# Patient Record
Sex: MALE | Race: WHITE | Employment: OTHER | URBAN - METROPOLITAN AREA
[De-identification: names, ages, dates, MRNs, and addresses within clinical notes are randomized per-mention and may not be internally consistent; named-entity substitution may affect disease eponyms.]

---

## 2017-05-22 PROBLEM — E66.9 OBESITY (BMI 30.0-34.9): Status: ACTIVE | Noted: 2017-05-22

## 2017-05-22 PROBLEM — R97.20 ELEVATED PSA: Status: ACTIVE | Noted: 2017-05-22

## 2018-01-03 PROBLEM — N40.2 PROSTATE NODULE: Status: ACTIVE | Noted: 2018-01-03

## 2018-01-15 ENCOUNTER — HOSPITAL ENCOUNTER (OUTPATIENT)
Dept: LAB | Age: 68
Discharge: HOME | End: 2018-01-15

## 2018-01-15 PROCEDURE — 88305 TISSUE EXAM BY PATHOLOGIST: CPT | Performed by: UROLOGY

## 2019-05-29 ENCOUNTER — HOSPITAL ENCOUNTER (OUTPATIENT)
Dept: PHYSICAL THERAPY | Age: 69
Discharge: HOME | End: 2019-05-29
Payer: MEDICARE

## 2019-05-29 PROCEDURE — 97140 MANUAL THERAPY 1/> REGIONS: CPT

## 2019-05-29 PROCEDURE — 97161 PT EVAL LOW COMPLEX 20 MIN: CPT

## 2019-05-29 NOTE — THERAPY EVALUATION
Dayana Goss  : 1950  [place]  Secondary: Bernardino Boone 73 at . S[address] Candelaria 37  [facility]. [place] [address], [address] [facility]  [phone]   [phone]       Visit Count:  1     OUTPATIENT PHYSICAL THERAPY:Initial Assessment 2019   ICD-10: Treatment Diagnosis: Pain in Right Shoulder (M25.511)  Stiffness of Right Shoulder, Not elsewhere classified (M25.611)  Precautions/Allergies:   Patient has no known allergies. MD Orders: evaluate and treat  MEDICAL/REFERRING DIAGNOSIS:  Pain in right shoulder [M25.511]  Bursitis of right shoulder [M75.51]   DATE OF ONSET: 6+ months ago  REFERRING PHYSICIAN: Nick Ochoa MD  RETURN PHYSICIAN APPOINTMENT: 6 weeks from referral      INITIAL ASSESSMENT:  Mr. Scottie Dick presents with functional limiations due to right shoulder pain. PT evaluation reveals signs and symptoms consistent with impingement including pain with shoulder elevation and rotation radiating to upper arm, poor postural alignment, poor scapular thoracic rhythm and shoulder hiking. He was diagnosed with a tear of rotator tendon through MRI and referred to PT to attempt conservative intervention prior to surgery. This patient will highly benefit from skilled PT at this time to improve shoulder mechanics and decrease further damage to tissue. PROBLEM LIST (Impacting functional limitations):  1. Decreased Strength  2. Increased Pain  3. Decreased Flexibility/Joint Mobility  4. Decreased Knowledge of Precautions  5. Decreased [place] with Home Exercise Program INTERVENTIONS PLANNED: (Treatment may consist of any combination of the following)  1. Cold  2. Heat  3. Home Exercise Program (HEP)  4. Manual Therapy  5. Neuromuscular Re-education/Strengthening  6. Range of Motion (ROM)  7. Therapeutic Exercise/Strengthening   TREATMENT PLAN:  Effective Dates: 2019 TO 2019 (90 days).   Frequency/Duration: 2 times a week for 90 Day(s)  GOALS: (Goals have been discussed and agreed upon with patient.)  Short-Term Functional Goals: Time Frame: 2-4 weeks    1. Pt will report shoulder pain is no longer constant. 2. Pt will demonstrate increased active shoulder flexion and abduction to >160 without pain to promote use. 3. Pt will demonstrate functional shoulder internal/external rotation without pain to promote active use. 4. Pt will participate in postural re-education to improve alignment during functional use of BUE  5. Pt will be independent with HEP  6. Pt will demonstrate a 5 point increase on the DASH to indicate decreased perceived disability    Long Term Goals Time frame   1. Pt will demonstrate full AROM against gravity without pain to promote pain free lifting, reaching and ADLs  2. Pt will demonstrate at least 4+/5 shoulder strength to assist with carrying and lifting during daily activities  3. Pt will report less than 2/10 pain during daily activities. 4. Pt will demonstrate a 8 point increase on the DASH to indicate decreased perceived disability  5. Pt will demonstrate awareness of proper posture and mechanics to promote carryover in ADLs. OUTCOME MEASURE:   Tool Used: Disabilities of the Arm, Shoulder and Hand (DASH) Questionnaire - Quick Version  Score:  Initial: 24/55  Most Recent: X/55 (Date: -- )   Interpretation of Score: The DASH is designed to measure the activities of daily living in person's with upper extremity dysfunction or pain. Each section is scored on a 1-5 scale, 5 representing the greatest disability. The scores of each section are added together for a total score of 55. MEDICAL NECESSITY:   · Patient is expected to demonstrate progress in strength and range of motion to increase independence with ADLs and recreational activities.   REASON FOR SERVICES/OTHER COMMENTS:  · Patient continues to require modification of therapeutic interventions to increase complexity of exercises. Total Duration:  PT Patient Time In/Time Out  Time In: 0800  Time Out: 0900    Rehabilitation Potential For Stated Goals: Good  Regarding Berneta Duverney Spiller's therapy, I certify that the treatment plan above will be carried out by a therapist or under their direction. Thank you for this referral,  Lenny Sullivan, PT     Referring Physician Signature: Nick Ochoa MD _______________________________ Date _____________     PAIN/SUBJECTIVE:   Initial:   2/10 Post Session:  3/10   HISTORY:   History of Injury/Illness (Reason for Referral):  Pt states that he began experiencing right shoulder pain last fall while picking up heavy bags. Progressively worse. Went to MD, x-ray negative, MRI positive for small RC tear. Current symptoms: constant ache with intermittent sharp moments lateral right shoulder and upper arm. Aggravating factors: reaching behind, dressing, lifting, reaching overhead    Relieving factors: rest        Pain: 2/10 presently, 8/10 worst     Past Medical History/Comorbidities:   Mr. Scottie Dick  has a past medical history of BPH associated with nocturia (10/10/2011), Calculus of kidney, Chronic otitis media, Conductive hearing loss, Deviated nasal septum, Dysfunction of both eustachian tubes, Elevated PSA (5/22/2017), External hemorrhoids, Hearing loss, Hypertension (10/10/2011), Hypertrophy of prostate with urinary obstruction and other lower urinary tract symptoms (LUTS) (10/10/2011), Mixed hearing loss, bilateral (4/22/2012), Mixed hyperlipidemia (10/10/2011), Obesity (BMI 30.0-34.9) (5/22/2017), Perforation of left tympanic membrane, Right chronic serous otitis media, and SNHL (sensorineural hearing loss). Mr. Scottie Dick  has a past surgical history that includes hx myringotomy (Bilateral, 01/25/2016) and hx colonoscopy (05/21/2014).   Social History/Living Environment:    one story home with wife     Prior Level of Function/Work/Activity:    Dominant Side:         RIGHT    Other Clinical Tests:          Imaging: YES x-ray and MRI     Previous Treatment Approaches:          Antiinflammatory topical cream    Ambulatory/Rehab Services H2 Model Falls Risk Assessment   Risk Factors:       (1)  Gender [Male] Ability to Rise from Chair:       (4)  Unable to rise without assistance   Falls Prevention Plan:       No modifications necessary   Total: (5 or greater = High Risk): 1   ©2010 [facility] [employer]. All Rights Reserved. [employer] Patent #[id number]. Federal Law prohibits the replication, distribution or use without written permission from [facility] [employer]   Current Medications:       Current Outpatient Medications:     diclofenac (VOLTAREN) 1 % gel, Apply  to affected area four (4) times daily. , Disp: 100 g, Rfl: 0    tamsulosin (FLOMAX) 0.4 mg capsule, Take 1 Cap by mouth daily. , Disp: 90 Cap, Rfl: 3    atorvastatin (LIPITOR) 10 mg tablet, Take 1 Tab by mouth daily. , Disp: 90 Tab, Rfl: 3    lisinopril (PRINIVIL, ZESTRIL) 20 mg tablet, Take 1 Tab by mouth daily. , Disp: 90 Tab, Rfl: 3    aspirin delayed-release 81 mg tablet, Take  by mouth daily. , Disp: , Rfl:    Date Last Reviewed:  5/29/2019     Number of Personal Factors/Comorbidities that affect the Plan of Care: 0: LOW COMPLEXITY   EXAMINATION:   Observation/Orthostatic Postural Assessment:          Standing: Forward Head, Rounded Shoulders, right shoulder elevated, Thoracic Kyphosis, Decreased Lumbar Lordosis        Sitting:  Forward Head, Rounded Shoulders, Thoracic Kyphosis, Decreased Lumbar Lordosis, right shoulder elevated     Palpation:    ·     Increased tone of right upper trapezius, pectorals  ·     Atrophy of mid and lower trapezius   ·     Point tender of bicipital grove    ROM:    Shoulder DATE  5/29/19 DATE     Shoulder flexion R: 140   L: 165 R:   L:    Shoulder abd R: 140   L: 165 R:   L:    shoulder External rotation  R: 80 @60 abd  L: 80 @ 60 abd R:   L:    shoulder Internal rotation R: 45 @ 60 abd cs  L: 60 @ 60 abd cs  R:   L:    Elbow flex/ext  R: WNL   L: WNL R:   L:        Strength:       DATE  5/29/19 DATE     Shoulder flexion R: 4/5 cs  L: 5/5  R:   L:    Shoulder abd R: 4+/5 cs  L: 5/5 R:   L:    shoulder External rotation  R: 4/5 cs  L: 5/5 R:   L:    Shoulder Internal rotation R: 5/5   L: 5/5 R:   L:    Elbow flexion  R: 4+/5  L: 5/5 R:   L:    Elbow extension  R: 4+/5   L:5/5  R:   L:     Cs: symptoms present    Neurological Screen:   intact           Functional Mobility:         Gait/Ambulation: no abnormalities        Transfers:  No use of UE sit to stand         Functional reach: limited overhead and behind back R     Balance:          No abnormalities       Body Structures Involved:  1. Joints  2. Muscles  3. Ligaments Body Functions Affected:  1. Sensory/Pain  2. Neuromusculoskeletal  3. Movement Related Activities and Participation Affected:  1. General Tasks and Demands  2. Mobility  3.  Self Care   Number of elements (examined above) that affect the Plan of Care: 3: MODERATE COMPLEXITY   CLINICAL PRESENTATION:   Presentation: Stable and uncomplicated: LOW COMPLEXITY   CLINICAL DECISION MAKING:   Use of outcome tool(s) and clinical judgement create a POC that gives a: Clear prediction of patient's progress: LOW COMPLEXITY

## 2019-05-29 NOTE — PROGRESS NOTES
Zaida Owens  : 1950  [place]  Secondary: Bernardino Boone 73 at . [address]  [address]. [place] [address], [address]  [phone]   [phone]    Visit Count:  1   OUTPATIENT PHYSICAL THERAPY:  Daily Treatment Note  2019   ICD-10: Treatment Diagnosis: Pain in right shoulder [M25.511]  Bursitis of right shoulder [M75.51] Stiffness of Right Shoulder, Not elsewhere classified (M25.611)    Precautions/Allergies:   Patient has no known allergies. MD Orders: evaluate and treat  MEDICAL/REFERRING DIAGNOSIS:  Pain in right shoulder [M25.511]  Bursitis of right shoulder [M75.51]   DATE OF ONSET: 6+ mos  REFERRING PHYSICIAN: Kelly Eubanks MD  RETURN PHYSICIAN APPOINTMENT: 6 weeks from IE       Pre-treatment Symptoms/Complaints:  Constant right shoulder pain   Pain: Initial:   2/10 Post Session:  3/10   Medications Last Reviewed:  2019   Updated Objective Findings:  see initial evaluation      TREATMENT:     THERAPEUTIC EXERCISE: (2 minutes):  Exercises per grid below to improve mobility and strength. Required moderate visual, verbal and manual cues to promote proper body alignment, promote proper body posture and promote proper body mechanics. Progressed resistance, range and repetitions as indicated. Date:  2019   Date:   Date:     Activity/Exercise Parameters Parameters Parameters   Education  POC, posture, HEP, use of ice 2-3/day     scap squeeze HEP     scap depression  HEP     t-band ER/IR NV                           MANUAL THERAPY: (10 minutes): Joint mobilization and Soft tissue mobilization was utilized and necessary because of the patient's restricted joint motion, painful spasm, loss of articular motion and restricted motion of soft tissue.    · Inhibition technique to right pectorals  · Shoulder quadrant grade 2  · Long distraction with oscillation   MODALITIES: ( minutes):            MedBridge Portal  Treatment/Session Summary:    · Response to Treatment:  demonstrated understanding of HEP and postural education. .  · Communication/Consultation:  initial eval sent to MD for review and signature   · Equipment provided today:  HEP handout        Recommendations/Intent for next treatment session: Next visit will focus on adding to HEP, manual techniques.         Treatment Plan of Care Effective Dates:  5/29/19 to 8/27/2019         Total Treatment Billable Duration:  10 min (+ evaluation)   PT Patient Time In/Time Out  Time In: 0800  Time Out: 0900  Ami Lucia PT    Future Appointments   Date Time Provider Job Babcock   6/5/2019 11:15 AM Samanta Espinoza, PT [facility]   6/7/2019 11:15 AM Samanta Espinoza, PT [facility]   6/12/2019  1:45 PM Samanta Espinoza, PT [facility]   6/14/2019  9:00 AM Samanta Espinoza, PT [facility]   6/19/2019  8:00 AM Samanta Espinoza, PT [facility]   6/21/2019  8:00 AM Samanta Espinoza, PT [facility]   6/26/2019  8:00 AM Samanta Espinoza, PT [facility]   6/28/2019  8:00 AM Samanta Espinoza, PT [facility]   7/3/2019  8:00 AM Samanta Espinoza, PT [facility]   7/5/2019  8:00 AM Samanta Espinoza, PT [facility]   8/21/2019  8:15 AM Negro Amezquita DO [facility] [place] [facility]   8/28/2019  8:00 AM [facility]   9/4/2019  9:20 AM Tiffany Sanchez MD [facility]   11/11/2019  9:15 AM Patsy Hamilton MD [facility]

## 2019-06-05 ENCOUNTER — HOSPITAL ENCOUNTER (OUTPATIENT)
Dept: PHYSICAL THERAPY | Age: 69
Discharge: HOME | End: 2019-06-05
Payer: MEDICARE

## 2019-06-05 PROCEDURE — 97110 THERAPEUTIC EXERCISES: CPT

## 2019-06-05 PROCEDURE — 97140 MANUAL THERAPY 1/> REGIONS: CPT

## 2019-06-05 NOTE — PROGRESS NOTES
Yoko Drilling  : 1950  [place]  Secondary: Bernardino Boone 73 at . [address]  [address]. [place] [address] [facility] [address]  [phone]   [phone]    Visit Count:  2   OUTPATIENT PHYSICAL THERAPY:  Daily Treatment Note  2019   ICD-10: Treatment Diagnosis: Pain in right shoulder [M25.511]  Bursitis of right shoulder [M75.51] Stiffness of Right Shoulder, Not elsewhere classified (M25.611)    Precautions/Allergies:   Patient has no known allergies. MD Orders: evaluate and treat  MEDICAL/REFERRING DIAGNOSIS:  Pain in right shoulder [M25.511]  Bursitis of right shoulder [M75.51]   DATE OF ONSET: 6+ mos  REFERRING PHYSICIAN: Sukhwinder Nieves MD  RETURN PHYSICIAN APPOINTMENT: 6 weeks from IE       Pre-treatment Symptoms/Complaints: still constant 1-2/10 pain of lateral right shoulder but increases for a couple hours after HEP. Pt  unsure if doing correctly   Pain: Initial:   2/10 Post Session:  1/10   Medications Last Reviewed:  2019   Updated Objective Findings:  see initial evaluation      TREATMENT:     THERAPEUTIC EXERCISE: (15 minutes):  Exercises per grid below to improve mobility and strength. Required moderate visual, verbal and manual cues to promote proper body alignment, promote proper body posture and promote proper body mechanics. Progressed resistance, range and repetitions as indicated.    Date:  2019   Date:  19 Date:     Activity/Exercise Parameters Parameters Parameters   Education  POC, posture, HEP, use of ice 2-3/day     scap squeeze HEP X 10, 10 sec     scap depression  HEP X 4     t-band ER/IR NV YTB x 10 supine, elbows propped      Wand flexion   X 10     UBE  nv               MANUAL THERAPY: (25 minutes): Joint mobilization and Soft tissue mobilization was utilized and necessary because of the patient's restricted joint motion, painful spasm, loss of articular motion and restricted motion of soft tissue. · Inhibition technique to right pectorals  · Shoulder quadrant grade IV--  · Long distraction with oscillation   · Grade IV AP and inferior mobs to R gh joint and ac joint  ·   MODALITIES: (10 minutes): To decrease inflammation     ·   Ice application to right shoulder      MedBridge Portal  Treatment/Session Summary:     · Response to Treatment:  Pt required cuing for scap squeeze as pt had been shrugging shoulder during exercise. Pt demonstrated good understanding and independence of all exercises at end of session. Pain of right shoulder decreased to 1/10 at end of session. · Communication/Consultation:  initial eval sent to MD for review and signature   · Equipment provided today:  HEP handout      Recommendations/Intent for next treatment session: Next visit will focus on adding to HEP, manual techniques.       Treatment Plan of Care Effective Dates:  5/29/19 to 8/27/2019       Total Treatment Billable Duration:    PT Patient Time In/Time Out  Time In: 1115  Time Out: Robert Lorenzo PT    Future Appointments   Date Time Provider Job Babcock   6/7/2019 11:15 AM Mac Europe, PT [facility]   6/12/2019  1:45 PM Mac Europe, PT [facility]   6/14/2019  9:00 AM Mac Europe, PT [facility]   6/19/2019  8:00 AM Mac Europe, PT [facility]   6/21/2019  8:00 AM Mac Europe, PT [facility]   6/26/2019  8:00 AM Mac Europe, PT [facility]   6/28/2019  8:00 AM Mac Europe, PT [facility]   7/3/2019  8:00 AM Mac Europe, PT [facility]   7/5/2019  8:00 AM Mac Europe, PT [facility]   8/21/2019  8:15 AM Dasia Ladd DO [facility] [place] [facility]   8/28/2019  8:00 AM [facility]U   9/4/2019  9:20 AM Desiree Stroud MD [facility]   11/11/2019  9:15 AM Lisa Nicholas MD [facility]

## 2019-06-07 ENCOUNTER — HOSPITAL ENCOUNTER (OUTPATIENT)
Dept: PHYSICAL THERAPY | Age: 69
Discharge: HOME | End: 2019-06-07
Payer: MEDICARE

## 2019-06-07 PROCEDURE — 97110 THERAPEUTIC EXERCISES: CPT

## 2019-06-07 PROCEDURE — 97140 MANUAL THERAPY 1/> REGIONS: CPT

## 2019-06-12 ENCOUNTER — HOSPITAL ENCOUNTER (OUTPATIENT)
Dept: PHYSICAL THERAPY | Age: 69
Discharge: HOME | End: 2019-06-12
Payer: MEDICARE

## 2019-06-12 PROCEDURE — 97140 MANUAL THERAPY 1/> REGIONS: CPT

## 2019-06-12 PROCEDURE — 97110 THERAPEUTIC EXERCISES: CPT

## 2019-06-12 NOTE — PROGRESS NOTES
Lamonte Bermudez  : 1950  [place]  Secondary: Bernardino Boone 73 at . [address]  [facility]. [place] [address], [address] [facility]  [phone]   [phone]    Visit Count:  4   OUTPATIENT PHYSICAL THERAPY:  Daily Treatment Note  2019   ICD-10: Treatment Diagnosis: Pain in right shoulder [M25.511]  Bursitis of right shoulder [M75.51] Stiffness of Right Shoulder, Not elsewhere classified (M25.611)    Precautions/Allergies:   Patient has no known allergies. MD Orders: evaluate and treat  MEDICAL/REFERRING DIAGNOSIS:  Pain in right shoulder [M25.511]  Bursitis of right shoulder [M75.51]   DATE OF ONSET: 6+ mos  REFERRING PHYSICIAN: Med Dumont MD  RETURN PHYSICIAN APPOINTMENT: 6 weeks from IE       Pre-treatment Symptoms/Complaints: Pain no longer constant but feels pain with scap retract exercise some times. Pain: Initial:   0/10 Post Session:  0/10   Medications Last Reviewed:  2019   Updated Objective Findings:  None today     TREATMENT:     THERAPEUTIC EXERCISE: (30 minutes):  Exercises per grid below to improve mobility and strength. Required moderate visual, verbal and manual cues to promote proper body alignment, promote proper body posture and promote proper body mechanics. Progressed resistance, range and repetitions as indicated.    Date:  2019   Date:  19 Date:  19   Activity/Exercise Parameters Parameters Parameters    Education  POC, posture, HEP, use of ice 2-3/day      scap squeeze HEP X 10, 10 sec      scap depression  HEP X 4      t-band ER/IR NV YTB x 10 supine, elbows propped    RTB x 10    Wand flexion   X 10  X 10     UBE  nv  nv 6 min bkwd    Flexion with ER B   YTB  X 10     rows   Nv GTB x 10    extensions   nv GTB x 10    ER/IR   nv RTB x 10 R       MANUAL THERAPY: (15 minutes): Joint mobilization and Soft tissue mobilization was utilized and necessary because of the patient's restricted joint motion, painful spasm, loss of articular motion and restricted motion of soft tissue. · Deep MFR and stretching to right pectorals  · Shoulder quadrant grade IV--  · Long distraction with oscillation   · Grade IV AP and inferior mobs to R gh joint and ac joint  · iso ER at different angles   ·   MODALITIES: (10 minutes): To decrease inflammation     ·   Ice application to right shoulder      MedBridge Portal  Treatment/Session Summary:     · Response to Treatment:  Cuing needed during new exercises but pt demonstrated good understanding and independence  at end of session. No pain during or after any activities today. · Communication/Consultation:    · Equipment provided today:  Hand out for new t-band exercises       Recommendations/Intent for next treatment session: Next visit will focus on adding to HEP, manual techniques. begin T-band in standing     Treatment Plan of Care Effective Dates:  5/29/19 to 8/27/2019     Total Treatment Billable Duration:  45 min   PT Patient Time In/Time Out  Time In: 1345  Time Out: [facility], PT    Future Appointments   Date Time Provider Job Babcock   6/14/2019  9:00 AM Ardella Challenger, PT [facility]   6/19/2019  8:00 AM Ardella Challenger, PT [facility]   6/21/2019  8:00 AM Ardella Challenger, PT [facility]   6/26/2019  8:00 AM Ardella Challenger, PT [facility]   6/28/2019  8:00 AM Ardella Challenger, PT [facility]   7/3/2019  8:00 AM Ardella Challenger, PT [facility]   7/5/2019  8:00 AM Ardella Challenger, PT [facility]   8/21/2019  8:15 AM Love Dakins, Cosette Kidd, DO [facility] [place] [facility]   8/28/2019  8:00 AM [facility] PGT [facility]   9/4/2019  9:20 AM Monisha Nuñez MD [facility]   11/11/2019  9:15 AM Osmin Ramachandran MD [facility]

## 2019-06-14 ENCOUNTER — HOSPITAL ENCOUNTER (OUTPATIENT)
Dept: PHYSICAL THERAPY | Age: 69
Discharge: HOME | End: 2019-06-14
Payer: MEDICARE

## 2019-06-14 PROCEDURE — 97110 THERAPEUTIC EXERCISES: CPT

## 2019-06-14 PROCEDURE — 97140 MANUAL THERAPY 1/> REGIONS: CPT

## 2019-06-14 NOTE — PROGRESS NOTES
[facility]  : 1950  [place]  Secondary: Bernardino Boone 73 at . Sajan [address]elaria 37  [facility]. [address] [address], [address]  [phone]   [phone]    Visit Count:  5   OUTPATIENT PHYSICAL THERAPY:  Daily Treatment Note  2019   ICD-10: Treatment Diagnosis: Pain in right shoulder [M25.511]  Bursitis of right shoulder [M75.51] Stiffness of Right Shoulder, Not elsewhere classified (M25.611)    Precautions/Allergies:   Patient has no known allergies. MD Orders: evaluate and treat  MEDICAL/REFERRING DIAGNOSIS:  Pain in right shoulder [M25.511]  Bursitis of right shoulder [M75.51]   DATE OF ONSET: 6+ mos  REFERRING PHYSICIAN: Rebecca Josue MD  RETURN PHYSICIAN APPOINTMENT: 6 weeks from IE       Pre-treatment Symptoms/Complaints: pt states that he has some muscle soreness from last session of upper arm. Only mild today. Pt states that the only thing that consitently causes shoulder pain at this time is putting on his shirt. Pain: Initial:   2/10 Post Session:  0/10 it feels good   Medications Last Reviewed:  2019   Updated Objective Findings:  None today     TREATMENT:     THERAPEUTIC EXERCISE: (30 minutes):  Exercises per grid below to improve mobility and strength. Required moderate visual, verbal and manual cues to promote proper body alignment, promote proper body posture and promote proper body mechanics. Progressed resistance, range and repetitions as indicated.    Date:  2019   Date:  19 Date:  19   Activity/Exercise Parameters Parameters Parameters     Education  POC, posture, HEP, use of ice 2-3/day       scap squeeze HEP X 10, 10 sec       scap depression  HEP X 4       t-band ER/IR NV YTB x 10 supine, elbows propped    RTB x 10     Wand flexion   X 10  X 10      UBE  nv  nv 6 min bkwd  6 min bkwd level 2   Flexion with ER B   YTB  X 10      rows   Nv GTB x 10     Wand extensions     X 10    Wand IR      X 10    pec stretch      3 x 20 sec doorway    Wall slides      Flexion x 20   Flex with abd x 10    extensions   nv GTB x 10     ER/IR   nv RTB x 10 R    Free weights      2 # x 10 flex,   X 10 scaption                        MANUAL THERAPY: (15 minutes): Joint mobilization and Soft tissue mobilization was utilized and necessary because of the patient's restricted joint motion, painful spasm, loss of articular motion and restricted motion of soft tissue. · Deep MFR and stretching to right pectorals  · Shoulder quadrant grade IV--  · Long distraction with oscillation   · Grade IV AP and inferior mobs to R gh joint and ac joint  · iso ER at different angles   ·   MODALITIES: (10 minutes): To decrease inflammation     ·   Ice application to right shoulder      MedBridge Portal  Treatment/Session Summary:     · Response to Treatment: making steady progress with ROM. Some weakness with free weights noted with mild pain during flexion to 90 deg. Cuing needed during new exercises. · Communication/Consultation:    · Equipment provided today:  Hand out for new t-band exercises       Recommendations/Intent for next treatment session: Next visit will focus on adding to HEP, manual techniques. begin T-band in standing     Treatment Plan of Care Effective Dates:  5/29/19 to 8/27/2019     Total Treatment Billable Duration:  55 min   PT Patient Time In/Time Out  Time In: 0900  Time Out: Justyn 3, PT    Future Appointments   Date Time Provider Job Babcock   6/19/2019  8:00 AM Melisa Lopes, PT [facility]   6/21/2019  8:00 AM Melisa Lopes, PT [facility]   6/26/2019  8:00 AM Melisa Lopes, PT [facility]   6/28/2019  8:00 AM Melisa Pack, PT [facility]   7/3/2019  8:00 AM Melisa Pack, PT [facility]   7/5/2019  8:00 AM Melisa Pack, PT [facility]   8/21/2019  8:15 AM Antoientte Betancourt, DO [facility]   8/28/2019  [facility]   9/4/2019  9:20 AM Ponciano Leyden, MD [facility] PGT PGU   11/11/2019  9:15 AM Eleazar Paz MD [facility]

## 2019-06-19 ENCOUNTER — HOSPITAL ENCOUNTER (OUTPATIENT)
Dept: PHYSICAL THERAPY | Age: 69
Discharge: HOME | End: 2019-06-19
Payer: MEDICARE

## 2019-06-19 PROCEDURE — 97140 MANUAL THERAPY 1/> REGIONS: CPT

## 2019-06-19 PROCEDURE — 97110 THERAPEUTIC EXERCISES: CPT

## 2019-06-19 NOTE — PROGRESS NOTES
[facility]  : 1950  [place]  Secondary: Bernardino Sharma at [place] [id number]. [address], [address]  [phone]   [phone]    Visit Count:  6   OUTPATIENT PHYSICAL THERAPY:  Daily Treatment Note  2019   ICD-10: Treatment Diagnosis: Pain in right shoulder [M25.511]  Bursitis of right shoulder [M75.51] Stiffness of Right Shoulder, Not elsewhere classified (M25.611)    Precautions/Allergies:   Patient has no known allergies. MD Orders: evaluate and treat  MEDICAL/REFERRING DIAGNOSIS:  Pain in right shoulder [M25.511]  Bursitis of right shoulder [M75.51]   DATE OF ONSET: 6+ mos  REFERRING PHYSICIAN: Brayan Torres MD  RETURN PHYSICIAN APPOINTMENT: 6 weeks from IE       Pre-treatment Symptoms/Complaints: pt states that she is doing well overall but typically feels achy of upper arm the day after therapy sessions. Pain: Initial:   0/10 Post Session:  0/10 it feels good   Medications Last Reviewed:  2019   Updated Objective Findings:  None today     TREATMENT:     THERAPEUTIC EXERCISE: (30 minutes):  Exercises per grid below to improve mobility and strength. Required moderate visual, verbal and manual cues to promote proper body alignment, promote proper body posture and promote proper body mechanics. Progressed resistance, range and repetitions as indicated.    Date:  2019   Date:  19 Date:  19   Activity/Exercise Parameters Parameters Parameters      Education  POC, posture, HEP, use of ice 2-3/day        scap squeeze HEP X 10, 10 sec        scap depression  HEP X 4        t-band ER/IR NV YTB x 10 supine, elbows propped    RTB x 10      Wand flexion   X 10  X 10       UBE  nv  nv 6 min bkwd  6 min bkwd level 2 6 min bkwd level 3   Flexion with ER B   YTB  X 10       rows   Nv GTB x 10      Wand extensions     X 10     Wand IR      X 10     pec stretch 3 x 20 sec doorway  Manually 3 x 20 sec    Wall slides      Flexion x 20   Flex with abd x 10     extensions   nv GTB x 10   7# x 20 cables    ER/IR   nv RTB x 10 R  ER step out 7# x 10   IR 7# full ROM    Free weights      2 # x 10 flex,   X 10 scaption  1# flexion 3 x 10   3# press up    Cables       7# adduction, extension  2 x 10     CKC       Elevated plank with shoulder tap 2 x 10        MANUAL THERAPY: (15 minutes): Joint mobilization and Soft tissue mobilization was utilized and necessary because of the patient's restricted joint motion, painful spasm, loss of articular motion and restricted motion of soft tissue. · Deep MFR and stretching to right pectorals  · Shoulder quadrant grade IV--  · Long distraction with oscillation   · Grade IV AP and inferior mobs to R gh joint and ac joint  · iso ER at different angles   ·   MODALITIES: (10 minutes): To decrease inflammation     ·   Ice application to right shoulder      MedBridge Portal  Treatment/Session Summary:     · Response to Treatment: making steady progress with ROM. Some pain with shoulder flexion but relieved with improved scap position. Also modified resist ER to step out, iso to reduce pain. · Communication/Consultation:    · Equipment provided today:  Hand out for new t-band exercises       Recommendations/Intent for next treatment session: Next visit will focus on adding to HEP, manual techniques. begin T-band in standing     Treatment Plan of Care Effective Dates:  5/29/19 to 8/27/2019     Total Treatment Billable Duration:  55 min   PT Patient Time In/Time Out  Time In: 0800  Time Out: 0900  Ayde Hermosillo PT    Future Appointments   Date Time Provider Job Babcock   6/21/2019  8:00 AM Sumit Moya PT [facility]   6/26/2019  8:00 AM Sumit Moya PT [facility]   6/28/2019  8:00 AM Sumit Moya PT [facility]   7/3/2019  8:00 AM Sumit Moya PT [facility]   7/5/2019  8:00 AM Boo Casey, PT [facility]   8/21/2019  8:15 AM Britney Pollack DO [facility] ENTS [place] [facility]   8/28/2019  8:00 AM [facility] PGT PGU   9/4/2019  9:20 AM Sil Schmidt MD [facility] PGT PGU   11/11/2019  9:15 AM Jeremiah Lopez MD [facility] SIM

## 2019-06-21 ENCOUNTER — HOSPITAL ENCOUNTER (OUTPATIENT)
Dept: PHYSICAL THERAPY | Age: 69
Discharge: HOME | End: 2019-06-21
Payer: MEDICARE

## 2019-06-21 PROCEDURE — 97110 THERAPEUTIC EXERCISES: CPT

## 2019-06-21 PROCEDURE — 97140 MANUAL THERAPY 1/> REGIONS: CPT

## 2019-06-21 NOTE — PROGRESS NOTES
Orion Chen  : 1950  [place]  Secondary: Bernardino Boone 73 at . [address]  [facility]. [address], [address]  [phone]   [phone]    Visit Count:  7   OUTPATIENT PHYSICAL THERAPY:  Daily Treatment Note  2019   ICD-10: Treatment Diagnosis: Pain in right shoulder [M25.511]  Bursitis of right shoulder [M75.51] Stiffness of Right Shoulder, Not elsewhere classified (M25.611)    Precautions/Allergies:   Patient has no known allergies. MD Orders: evaluate and treat  MEDICAL/REFERRING DIAGNOSIS:  Pain in right shoulder [M25.511]  Bursitis of right shoulder [M75.51]   DATE OF ONSET: 6+ mos  REFERRING PHYSICIAN: King Gunner MD  RETURN PHYSICIAN APPOINTMENT: 6 weeks from IE       Pre-treatment Symptoms/Complaints: pt states that he was still achy after last session but then less pain overall and decreased intensity of pain with taking his shirt off. Pain: Initial:   0/10 Post Session:  0/10 it feels good   Medications Last Reviewed:  2019   Updated Objective Findings:  None today     TREATMENT:     THERAPEUTIC EXERCISE: (40 minutes):  Exercises per grid below to improve mobility and strength. Required moderate visual, verbal and manual cues to promote proper body alignment, promote proper body posture and promote proper body mechanics. Progressed resistance, range and repetitions as indicated.    Date:  2019   Date:  19 Date:  19   Activity/Exercise Parameters Parameters Parameters       Education  POC, posture, HEP, use of ice 2-3/day         scap squeeze HEP X 10, 10 sec      D/c for HEP (replaced with resited rows)   scap depression  HEP X 4         t-band ER/IR NV YTB x 10 supine, elbows propped    RTB x 10       Wand flexion   X 10  X 10        UBE  nv  nv 6 min bkwd  6 min bkwd level 2 6 min bkwd level 3 6 min bkwd level 3.6   Flexion with ER B YTB  X 10        rows   Nv GTB x 10       Wand extensions     X 10      Wand IR      X 10      pec stretch      3 x 20 sec doorway  Manually 3 x 20 sec     Wall slides      Flexion x 20   Flex with abd x 10   Flexion, x 20   Circles 10    extensions   nv GTB x 10   7# x 20 cables  10# x 20    ER/IR   nv RTB x 10 R  ER step out 7# x 10   IR 7# full ROM     Free weights      2 # x 10 flex,   X 10 scaption  1# flexion 3 x 10   3# press up     Cables       7# adduction, extension  2 x 10   7# pull downs x 10, 7# adduction x 10      CKC       Elevated plank with shoulder tap 2 x 10     Prone mid trap        X 15 with manual cuing    Supine PNF        Manual resist x 20 D1 and D2                 MANUAL THERAPY: ( 15 minutes): Joint mobilization and Soft tissue mobilization was utilized and necessary because of the patient's restricted joint motion, painful spasm, loss of articular motion and restricted motion of soft tissue. · Deep MFR and stretching to right pectorals and teres   · Shoulder quadrant grade IV--  · Long distraction with oscillation   · Grade IV AP and inferior mobs to R gh joint and ac joint  · iso ER at different angles (not today)    MODALITIES: (10 minutes): To decrease inflammation     ·   Ice application to right shoulder      MedBridge Portal  Treatment/Session Summary:  Continues to progress well. · Response to Treatment: manual cuing needed for new prone scap exercise but completed reps independently at end of set. Will hold on new exercise until confident in pt's independence. Pt without pain at end of session. · Communication/Consultation:  none today  · Equipment provided today:  None today       Recommendations/Intent for next treatment session: Next visit will focus on adding to HEP, manual techniques. begin T-band in standing     Treatment Plan of Care Effective Dates:  5/29/19 to 8/27/2019     Total Treatment Billable Duration:  55 min   PT Patient Time In/Time Out  Time In: 0800  Time Out: 0900  Becki Fraga, PT    Future Appointments   Date Time Provider Job Lópezi   6/26/2019  8:00 AM Mac Europe, PT [facility]   6/28/2019  8:00 AM Mac Europe, PT [facility]   7/3/2019  8:00 AM Mac Europe, PT [facility]   7/5/2019  8:00 AM Mac Europe, PT [facility]   8/21/2019  8:15 AM Dasia Ladd DO [facility] ENTS [place] [facility]   8/28/2019  8:00 AM PGT [facility] PGT PGU   9/4/2019  9:20 AM Desiree Stroud MD [facility]UHT PGU   11/11/2019  9:15 AM Lisa Nicholas MD [facility]

## 2019-06-26 ENCOUNTER — HOSPITAL ENCOUNTER (OUTPATIENT)
Dept: PHYSICAL THERAPY | Age: 69
Discharge: HOME | End: 2019-06-26
Payer: MEDICARE

## 2019-06-26 PROCEDURE — 97110 THERAPEUTIC EXERCISES: CPT

## 2019-06-26 PROCEDURE — 97140 MANUAL THERAPY 1/> REGIONS: CPT

## 2019-06-26 NOTE — PROGRESS NOTES
Dominic Sabillons  : 1950  [place]  Secondary: Bernardino Sharma at . [address]  [facility]. [address], [address]  [phone]   [phone]    Visit Count:  8   OUTPATIENT PHYSICAL THERAPY:  Daily Treatment Note  2019   ICD-10: Treatment Diagnosis: Pain in right shoulder [M25.511]  Bursitis of right shoulder [M75.51] Stiffness of Right Shoulder, Not elsewhere classified (M25.611)    Precautions/Allergies:   Patient has no known allergies. MD Orders: evaluate and treat  MEDICAL/REFERRING DIAGNOSIS:  Pain in right shoulder [M25.511]  Bursitis of right shoulder [M75.51]   DATE OF ONSET: 6+ mos  REFERRING PHYSICIAN: Tri Tran MD  RETURN PHYSICIAN APPOINTMENT: 19       Pre-treatment Symptoms/Complaints: pt states that he is still sore after therapy sessions but then feels better next day. Pt also states that he is moving his shoulder further before symptom onset. Pain: Initial:   0/10 Post Session:  0/10 it feels good   Medications Last Reviewed:  2019   Updated Objective Findings:  None today     TREATMENT:     THERAPEUTIC EXERCISE: (30 minutes):  Exercises per grid below to improve mobility and strength. Required moderate visual, verbal and manual cues to promote proper body alignment, promote proper body posture and promote proper body mechanics. Progressed resistance, range and repetitions as indicated.    Date:  2019   Date:  19 Date:  19   Activity/Exercise Parameters Parameters Parameters        Education  POC, posture, HEP, use of ice 2-3/day          scap squeeze HEP X 10, 10 sec      D/c for HEP (replaced with resited rows)    scap depression  HEP X 4          t-band ER/IR NV YTB x 10 supine, elbows propped    RTB x 10        Wand flexion   X 10  X 10         UBE  nv  nv 6 min bkwd  6 min bkwd level 2 6 min bkwd level 3 6 min bkwd level 3.6 6 min bkwd    Flexion with ER B   YTB  X 10         rows   Nv GTB x 10        Wand extensions     X 10       Wand IR      X 10       pec stretch      3 x 20 sec doorway  Manually 3 x 20 sec   Manual    Wall slides      Flexion x 20   Flex with abd x 10   Flexion, x 20   Circles 10     extensions   nv GTB x 10   7# x 20 cables  10# x 20  Cables 17#   ER/IR   nv RTB x 10 R  ER step out 7# x 10   IR 7# full ROM      Free weights      2 # x 10 flex,   X 10 scaption  1# flexion 3 x 10   3# press up      Cables       7# adduction, extension  2 x 10   7# pull downs x 10, 7# adduction x 10    17# extensions/pull downs   30# lat pulldowns   CKC       Elevated plank with shoulder tap 2 x 10      Prone lower trap         X 10   Prone mid trap        X 15 with manual cuing  X 10    Supine PNF        Manual resist x 20 D1 and D2 standing with cables [id number] x 20 3#  D2 extension x 20 3#   rythmic stabilization         X 10 at 90 deg flexion                                         MANUAL THERAPY: ( 15 minutes): Joint mobilization and Soft tissue mobilization was utilized and necessary because of the patient's restricted joint motion, painful spasm, loss of articular motion and restricted motion of soft tissue. · Deep MFR and stretching to right pectorals and teres   · Shoulder quadrant grade IV--  · Long distraction with oscillation   · Grade IV AP and inferior mobs to R gh joint   · iso ER at different angles     MODALITIES: (10 minutes): To decrease inflammation     ·   Ice application to right shoulder      MedBridge Portal  Treatment/Session Summary:  Continues to progress well with AROM with minimal discomfort. · Response to Treatment: manual cuing needed for new prone scap exercise but completed reps independently at end of set. Pt without pain at end of session.      · Communication/Consultation:  none today  · Equipment provided today:  None today       Recommendations/Intent for next treatment session: Next visit will focus on adding to HEP, manual techniques. begin      Treatment Plan of Care Effective Dates:  5/29/19 to 8/27/2019     Total Treatment Billable Duration:  55 min   PT Patient Time In/Time Out  Time In: 0800  Time Out: 0900  Sagar Cruz, SERJIO    Future Appointments   Date Time Provider Job Babcock   6/28/2019  8:00 AM Zo Marcial, PT [facility]   7/3/2019  8:00 AM Zo Marcial, PT [facility]   7/5/2019  8:00 AM Zo Marcial, PT [facility]   8/21/2019  8:15 AM [facility] Hyde [facility]   8/28/2019  8:00 AM [facility]   9/4/2019  9:20 AM Eduar Farias MD [facility] PGT PGU   11/11/2019  9:15 AM Devon Villatoro MD [facility]

## 2019-06-28 ENCOUNTER — HOSPITAL ENCOUNTER (OUTPATIENT)
Dept: PHYSICAL THERAPY | Age: 69
Discharge: HOME | End: 2019-06-28
Payer: MEDICARE

## 2019-06-28 PROCEDURE — 97140 MANUAL THERAPY 1/> REGIONS: CPT

## 2019-06-28 PROCEDURE — 97110 THERAPEUTIC EXERCISES: CPT

## 2019-06-28 NOTE — PROGRESS NOTES
Juliana Rosario  : 1950  [place]  Secondary: Bernardino Boone 73 at . [address]  [facility]. [address], [address]  [phone]   [phone]    Visit Count:  9   OUTPATIENT PHYSICAL THERAPY:  Daily Treatment Note  2019   ICD-10: Treatment Diagnosis: Pain in right shoulder [M25.511]  Bursitis of right shoulder [M75.51] Stiffness of Right Shoulder, Not elsewhere classified (M25.611)    Precautions/Allergies:   Patient has no known allergies. MD Orders: evaluate and treat  MEDICAL/REFERRING DIAGNOSIS:  Pain in right shoulder [M25.511]  Bursitis of right shoulder [M75.51]   DATE OF ONSET: 6+ mos  REFERRING PHYSICIAN: Shayne Hall MD  RETURN PHYSICIAN APPOINTMENT: 19       Pre-treatment Symptoms/Complaints: was a little sore day after last therapy but less than previous. Pt feels he is improving. Pain: Initial:   0/10 Post Session:  0/10 it feels good   Medications Last Reviewed:  2019   Updated Objective Findings:  None today     TREATMENT:     THERAPEUTIC EXERCISE: (25 minutes):  Exercises per grid below to improve mobility and strength. Required moderate visual, verbal and manual cues to promote proper body alignment, promote proper body posture and promote proper body mechanics. Progressed resistance, range and repetitions as indicated.    Date:  2019   Date:  19 Date:  19   Activity/Exercise Parameters Parameters Parameters         Education  POC, posture, HEP, use of ice 2-3/day           scap squeeze HEP X 10, 10 sec      D/c for HEP (replaced with resited rows)     scap depression  HEP X 4           t-band ER/IR NV YTB x 10 supine, elbows propped    RTB x 10         Wand flexion   X 10  X 10          UBE  nv  nv 6 min bkwd  6 min bkwd level 2 6 min bkwd level 3 6 min bkwd level 3.6 6 min bkwd  6 min bkwd   Flexion with ER B   YTB X 10          rows   Nv GTB x 10      17# from over head anchor 2 x 10    Wand extensions     X 10        Wand IR      X 10        pec stretch      3 x 20 sec doorway  Manually 3 x 20 sec   Manual  Supine with towel roll along t-spine, arms in 'Y' position 2 x 10    Wall slides      Flexion x 20   Flex with abd x 10   Flexion, x 20   Circles 10      extensions   nv GTB x 10   7# x 20 cables  10# x 20  Cables 17#    ER/IR   nv RTB x 10 R  ER step out 7# x 10   IR 7# full ROM       Free weights      2 # x 10 flex,   X 10 scaption  1# flexion 3 x 10   3# press up    2 x 10 flexion 2#   abd x 10 (assist to position at 90 deg then eccentric control wtihout assist )   Cables       7# adduction, extension  2 x 10   7# pull downs x 10, 7# adduction x 10    17# extensions/pull downs   30# lat pulldowns Rows 17# x 10   Pull downs 30# 2 x 10    CKC       Elevated plank with shoulder tap 2 x 10       Prone lower trap         X 10    Prone mid trap        X 15 with manual cuing  X 10     Supine PNF        Manual resist x 20 D1 and D2 standing with cables [id number] x 20 3#  D2 extension x 20 3#    rythmic stabilization         X 10 at 90 deg flexion                                 MANUAL THERAPY: ( 15 minutes): Joint mobilization and Soft tissue mobilization was utilized and necessary because of the patient's restricted joint motion, painful spasm, loss of articular motion and restricted motion of soft tissue. · Deep MFR and stretching to right pectorals and teres   · tirgger point release to bicep tendon and lateral deltoid  · Shoulder quadrant grade IV--  · Long distraction with oscillation   · Grade IV AP and inferior mobs to R gh joint   · iso ER at different angles     MODALITIES: (10 minutes): To decrease inflammation     ·   Ice application to right shoulder      MedBridge Portal  Treatment/Session Summary:  Continues to progress well with AROM with minimal discomfort.     · Response to Treatment: manual cuing needed for free weights    · Communication/Consultation:  none today  · Equipment provided today:  None today       Recommendations/Intent for next treatment session: Next visit will focus on adding to HEP, manual techniques. begin      Treatment Plan of Care Effective Dates:  5/29/19 to 8/27/2019     Total Treatment Billable Duration:  55 min   PT Patient Time In/Time Out  Time In: 0800  Time Out: [address] [facility], PT    Future Appointments   Date Time Provider Job Babcock   7/3/2019  8:00 AM Anastacia Lang, PT [facility]   7/5/2019  8:00 AM Anastacia Lang, PT [facility]   8/21/2019  8:15 AM Dash [facility]   8/28/2019  8:00 AM PGT [facility] PGU   9/4/2019  9:20 AM Jarrell Saba MD [facility] PGT PGU   11/11/2019  9:15 AM Yumi Chandler MD [facility] SIM

## 2019-07-03 ENCOUNTER — HOSPITAL ENCOUNTER (OUTPATIENT)
Dept: PHYSICAL THERAPY | Age: 69
Discharge: HOME | End: 2019-07-03
Payer: MEDICARE

## 2019-07-03 PROCEDURE — 97140 MANUAL THERAPY 1/> REGIONS: CPT

## 2019-07-03 PROCEDURE — 97110 THERAPEUTIC EXERCISES: CPT

## 2019-07-03 NOTE — PROGRESS NOTES
Christine Dave  : 1950  [place]  Secondary: Bernardino Sharma at . Sajan [address]elaria [address]  [address]. [place] [address], [address]  [phone]   [phone]    Visit Count:  10   OUTPATIENT PHYSICAL THERAPY:  Daily Treatment Note  7/3/2019   ICD-10: Treatment Diagnosis: Pain in right shoulder [M25.511]  Bursitis of right shoulder [M75.51] Stiffness of Right Shoulder, Not elsewhere classified (M25.611)    Precautions/Allergies:   Patient has no known allergies. MD Orders: evaluate and treat  MEDICAL/REFERRING DIAGNOSIS:  Pain in right shoulder [M25.511]  Bursitis of right shoulder [M75.51]   DATE OF ONSET: 6+ mos  REFERRING PHYSICIAN: Toan Morris MD  RETURN PHYSICIAN APPOINTMENT: 19       Pre-treatment Symptoms/Complaints: pt reports minimal change in symptoms over the last couple of visits, however, states that pain at worst has reached only 5/10 over the last week. (was 8/10 week prior)      Pain: Initial:   0/10 Post Session:  0/10 it feels good   Medications Last Reviewed:  7/3/2019   Updated Objective Findings:  None today     TREATMENT:     THERAPEUTIC EXERCISE: (25 minutes):  Exercises per grid below to improve mobility and strength. Required moderate visual, verbal and manual cues to promote proper body alignment, promote proper body posture and promote proper body mechanics. Progressed resistance, range and repetitions as indicated.    Date:  2019   Date:  19 Date:  6/7/19 6/12/19 6/14/19 6/19/19 6/21/19 6/26/19 6/28/19 7/3/19   Activity/Exercise Parameters Parameters Parameters          Education  POC, posture, HEP, use of ice 2-3/day            scap squeeze HEP X 10, 10 sec      D/c for HEP (replaced with resited rows)      scap depression  HEP X 4            t-band ER/IR NV YTB x 10 supine, elbows propped    RTB x 10          Wand flexion   X 10  X 10           UBE  nv  nv 6 min bkwd  6 min bkwd level 2 6 min bkwd level 3 6 min bkwd level 3.6 6 min bkwd  6 min bkwd 6 min bkwd level 5    Flexion with ER B   YTB  X 10           rows   Nv GTB x 10      17# from over head anchor 2 x 10  20# with rope attatchment at waist height    Wand extensions     X 10         Wand IR      X 10         pec stretch      3 x 20 sec doorway  Manually 3 x 20 sec   Manual  Supine with towel roll along t-spine, arms in 'Y' position 2 x 10     Wall slides      Flexion x 20   Flex with abd x 10   Flexion, x 20   Circles 10       extensions   nv GTB x 10   7# x 20 cables  10# x 20  Cables 17#     ER/IR   nv RTB x 10 R  ER step out 7# x 10   IR 7# full ROM     Side lying, x 10  X 10 iso resist at end range ER   Free weights      2 # x 10 flex,   X 10 scaption  1# flexion 3 x 10   3# press up    2 x 10 flexion 2#   abd x 10 (assist to position at 90 deg then eccentric control wtihout assist )    Cables       7# adduction, extension  2 x 10   7# pull downs x 10, 7# adduction x 10    17# extensions/pull downs   30# lat pulldowns Rows 17# x 10   Pull downs 30# 2 x 10     CKC       Elevated plank with shoulder tap 2 x 10     Ball on wall circles and rolling up flex and abd x 10 each    Prone lower trap         X 10     Prone mid trap        X 15 with manual cuing  X 10      Supine PNF        Manual resist x 20 D1 and D2 standing with cables [id number] x 20 3#  D2 extension x 20 3#     rythmic stabilization         X 10 at 90 deg flexion                                    MANUAL THERAPY: ( 15 minutes): Joint mobilization and Soft tissue mobilization was utilized and necessary because of the patient's restricted joint motion, painful spasm, loss of articular motion and restricted motion of soft tissue.     · tirgger point release to bicep tendon and lateral deltoid  · Shoulder quadrant grade IV--  · Long distraction with oscillation   · Grade IV AP and inferior mobs to R gh joint   · iso ER at different angles   ·  R shoulder flexion with Grade III AP scap mobs     MODALITIES: (10 minutes): To decrease inflammation     ·   Ice application to right shoulder      MedBridge Portal  Treatment/Session Summary: focused on stability exercises and end range manual techniques. Pt to see MD next week and would like to extend PT few more visits past that to improve residual symptoms. · Response to Treatment: manual cuing needed to decrease muscle contractions. Pt challenged by ball on wall stability exercise. Pt with minimal pain at end of manual at end range flexion. · Communication/Consultation:  none today  · Equipment provided today:  None today       Recommendations/Intent for next treatment session: Next visit will focus on adding to HEP, manual techniques.       Treatment Plan of Care Effective Dates:  5/29/19 to 8/27/2019     Total Treatment Billable Duration:  55 min   PT Patient Time In/Time Out  Time In: 0800  Time Out: 0900  Fabrice Garrido PT    Future Appointments   Date Time Provider Job Babcock   7/5/2019  8:00 AM Tammie Muñoz, PT [facility]   7/10/2019  2:30 PM Tammie Muñoz, PT [facility]   7/12/2019  9:30 AM Tammie Muñoz, PT [facility]   8/21/2019  8:15 AM Garry Corral DO [facility]   8/28/2019  8:00 AM [facility]   9/4/2019  9:20 AM Katrina Montano MD [facility]   11/11/2019  9:15 AM Kvng Quintero MD [facility]

## 2019-07-05 ENCOUNTER — HOSPITAL ENCOUNTER (OUTPATIENT)
Dept: PHYSICAL THERAPY | Age: 69
Discharge: HOME | End: 2019-07-05
Payer: MEDICARE

## 2019-07-05 PROCEDURE — 97110 THERAPEUTIC EXERCISES: CPT

## 2019-07-05 PROCEDURE — 97140 MANUAL THERAPY 1/> REGIONS: CPT

## 2019-07-05 NOTE — PROGRESS NOTES
Anish Jacinto  : 1950  [place]  Secondary: Bernardino Boone 73 at . [address]ajan [address]  [facility]. [place] [address], [address] [facility]  [phone]   [phone]    Visit Count:  11   OUTPATIENT PHYSICAL THERAPY:  Daily Treatment Note  2019   ICD-10: Treatment Diagnosis: Pain in right shoulder [M25.511]  Bursitis of right shoulder [M75.51] Stiffness of Right Shoulder, Not elsewhere classified (M25.611)    Precautions/Allergies:   Patient has no known allergies. MD Orders: evaluate and treat  MEDICAL/REFERRING DIAGNOSIS:  Pain in right shoulder [M25.511]  Bursitis of right shoulder [M75.51]   DATE OF ONSET: 6+ mos  REFERRING PHYSICIAN: Tara Aguilar MD  RETURN PHYSICIAN APPOINTMENT: 19       Pre-treatment Symptoms/Complaints: pt states that he is doing well currently but shoulder was a little more painful yesterday-- unsure why. Pain: Initial:   0/10 Post Session:  0/10 it feels good   Medications Last Reviewed:  2019   Updated Objective Findings:  None today     TREATMENT:     THERAPEUTIC EXERCISE: (30 minutes):  Exercises per grid below to improve mobility and strength. Required moderate visual, verbal and manual cues to promote proper body alignment, promote proper body posture and promote proper body mechanics. Progressed resistance, range and repetitions as indicated.    Date:  2019   Date:  19 Date:  6/7/19 6/12/19 6/14/19 6/19/19 6/21/19 6/26/19 6/28/19 7/3/19 7/5/19   Activity/Exercise Parameters Parameters Parameters           Education  POC, posture, HEP, use of ice 2-3/day             scap squeeze HEP X 10, 10 sec      D/c for HEP (replaced with resited rows)    During rows    scap depression  HEP X 4             t-band ER/IR NV YTB x 10 supine, elbows propped    RTB x 10           Wand flexion   X 10  X 10            UBE  nv  nv 6 min bkwd  6 min bkwd level 2 6 min bkwd level 3 6 min bkwd level 3.6 6 min bkwd  6 min bkwd 6 min bkwd level 5  6 min level 4.5    Flexion with ER B   YTB  X 10            rows   Nv GTB x 10      17# from over head anchor 2 x 10  20# with rope attatchment at waist height  20#  2x 10   Wand extensions     X 10          Wand IR      X 10          pec stretch      3 x 20 sec doorway  Manually 3 x 20 sec   Manual  Supine with towel roll along t-spine, arms in 'Y' position 2 x 10   Supine with towel roll along t-spine, arms in T and  'Y' position 2 x 10       Wall slides      Flexion x 20   Flex with abd x 10   Flexion, x 20   Circles 10        extensions   nv GTB x 10   7# x 20 cables  10# x 20  Cables 17#      ER/IR   nv RTB x 10 R  ER step out 7# x 10   IR 7# full ROM     Side lying, x 10  X 10 iso resist at end range ER Sidelying x 10 with 2# weight    Free weights      2 # x 10 flex,   X 10 scaption  1# flexion 3 x 10   3# press up    2 x 10 flexion 2#   abd x 10 (assist to position at 90 deg then eccentric control wtihout assist )  Flex and scap 2 x 10 2#    Cables       7# adduction, extension  2 x 10   7# pull downs x 10, 7# adduction x 10    17# extensions/pull downs   30# lat pulldowns Rows 17# x 10   Pull downs 30# 2 x 10   Rows 20# 2 x 10    CKC       Elevated plank with shoulder tap 2 x 10     Ball on wall circles and rolling up flex and abd x 10 each  Plank from elevated surface with  shoulder taps  x 10, x 5 steps     Prone lower trap         X 10      Prone mid trap        X 15 with manual cuing  X 10       Supine PNF        Manual resist x 20 D1 and D2 standing with cables [id number] x 20 3#  D2 extension x 20 3#   Standing cables 7# 2 x 10 D1 and 2   rythmic stabilization         X 10 at 90 deg flexion    X 10 IE/ER quick resist                                    MANUAL THERAPY: ( 15 minutes): Joint mobilization and Soft tissue mobilization was utilized and necessary because of the patient's restricted joint motion, painful spasm, loss of articular motion and restricted motion of soft tissue. · tirgger point release to bicep tendon and lateral deltoid  · Shoulder quadrant grade IV--  · Long distraction with oscillation   · Grade IV AP and inferior mobs to R gh joint   · iso ER at different angles   ·  R shoulder flexion with Grade III AP scap mobs     MODALITIES: (10 minutes): To decrease inflammation     ·   Ice application to right shoulder      MedBridge Portal  Treatment/Session Summary: continues to improve painfree ROM . · Response to Treatment: much improved ER and able to tolerate weight. Pt tends to improve shoulder rhythm after stability exercises. · Communication/Consultation:  none today  · Equipment provided today:  None today       Recommendations/Intent for next treatment session: Next visit will focus on adding to HEP, manual techniques.       Treatment Plan of Care Effective Dates:  5/29/19 to 8/27/2019     Total Treatment Billable Duration:  45min   PT Patient Time In/Time Out  Time In: 0800  Time Out: 0900  [place] , PT    Future Appointments   Date Time Provider Job Babcock   7/10/2019  2:30 PM Lanette Arellano, PT [facility]   7/12/2019  9:30 AM Lanette Arellano, PT [facility]   8/21/2019  8:15 AM Katarzyna Sellers DO [facility]   8/28/2019  8:00 AM [facility]   9/4/2019  9:20 AM Eli Mahan MD [facility]   11/11/2019  9:15 AM Tevin Maurer MD [facility]

## 2019-07-10 ENCOUNTER — APPOINTMENT (OUTPATIENT)
Dept: PHYSICAL THERAPY | Age: 69
End: 2019-07-10
Payer: MEDICARE

## 2019-07-12 ENCOUNTER — HOSPITAL ENCOUNTER (OUTPATIENT)
Dept: PHYSICAL THERAPY | Age: 69
Discharge: HOME | End: 2019-07-12
Payer: MEDICARE

## 2019-07-12 PROCEDURE — 97110 THERAPEUTIC EXERCISES: CPT

## 2019-07-12 NOTE — THERAPY DISCHARGE
Christine Dave  : 1950  [place]  Secondary: Bernardino Sharma at [facility]  [address]. [address], [address]  [phone]   [phone]       Visit Count:  12     OUTPATIENT PHYSICAL THERAPY:Discharge summary  2019   ICD-10: Treatment Diagnosis: Pain in Right Shoulder (M25.511)  Stiffness of Right Shoulder, Not elsewhere classified (M25.611)  Precautions/Allergies:   Patient has no known allergies. MD Orders: evaluate and treat  MEDICAL/REFERRING DIAGNOSIS:  Pain in right shoulder [M25.511]  Bursitis of right shoulder [M75.51]   DATE OF ONSET: 6+ months ago  REFERRING PHYSICIAN: Toan Morris MD  RETURN PHYSICIAN APPOINTMENT: 6 weeks from referral       ASSESSMENT:  Mr. Diego Tobin has progressed very well with physical therapy regarding his shoulder pain. He is currently pain free with typical daily activities and only experiences mild shoulder pain with end range external and internal rotation. Pt has met all therapy goals and voices readiness to continue independently progressing his HEP at this time. TREATMENT PLAN:    GOALS: (Goals have been discussed and agreed upon with patient.)  Short-Term Functional Goals:   1. Pt will report shoulder pain is no longer constant. (met)  2. Pt will demonstrate increased active shoulder flexion and abduction to >160 without pain to promote use. (met)  3. Pt will demonstrate functional shoulder internal/external rotation without pain to promote active use.(met)  4. Pt will participate in postural re-education to improve alignment during functional use of BUE (met)  5. Pt will be independent with HEP (met)  6. Pt will demonstrate a 5 point increase on the DASH to indicate decreased perceived disability (met)   Long Term Goals Time frame   1.  Pt will demonstrate full AROM against gravity without pain to promote pain free lifting, reaching and ADLs (met)  2. Pt will demonstrate at least 4+/5 shoulder strength to assist with carrying and lifting during daily activities (met)  3. Pt will report less than 2/10 pain during daily activities. (met)  4. Pt will demonstrate a 8 point increase on the DASH to indicate decreased perceived disability(met)  5. Pt will demonstrate awareness of proper posture and mechanics to promote carryover in ADLs. (met)       OUTCOME MEASURE:   Tool Used: Disabilities of the Arm, Shoulder and Hand (DASH) Questionnaire - Quick Version  Score:  Initial: 24/55  Most Recent: 15/55 (Date: 7/12/19 )   Interpretation of Score: The DASH is designed to measure the activities of daily living in person's with upper extremity dysfunction or pain. Each section is scored on a 1-5 scale, 5 representing the greatest disability. The scores of each section are added together for a total score of 55. Total Duration:  PT Patient Time In/Time Out  Time In: 1015  Time Out: 1100      Thank you for this referral,  Fabrice Garrido, PT          PAIN/SUBJECTIVE:   Initial:   2/10 Post Session:  3/10   HISTORY: taken on initial evaluation    History of Injury/Illness (Reason for Referral):  Pt states that he began experiencing right shoulder pain last fall while picking up heavy bags. Progressively worse. Went to MD, x-ray negative, MRI positive for small RC tear. Current symptoms: constant ache with intermittent sharp moments lateral right shoulder and upper arm.    Aggravating factors: reaching behind, dressing, lifting, reaching overhead    Relieving factors: rest        Pain: 2/10 presently, 8/10 worst     Past Medical History/Comorbidities:   Mr. Maria Elena Potter  has a past medical history of BPH associated with nocturia (10/10/2011), Calculus of kidney, Chronic otitis media, Conductive hearing loss, Deviated nasal septum, Dysfunction of both eustachian tubes, Elevated PSA (5/22/2017), External hemorrhoids, Hearing loss, Hypertension (10/10/2011), Hypertrophy of prostate with urinary obstruction and other lower urinary tract symptoms (LUTS) (10/10/2011), Mixed hearing loss, bilateral (4/22/2012), Mixed hyperlipidemia (10/10/2011), Obesity (BMI 30.0-34.9) (5/22/2017), Perforation of left tympanic membrane, Right chronic serous otitis media, and SNHL (sensorineural hearing loss). Mr. Diego Tobin  has a past surgical history that includes hx myringotomy (Bilateral, 01/25/2016) and hx colonoscopy (05/21/2014). Social History/Living Environment:    one story home with wife       Dominant Side:         RIGHT    Other Clinical Tests:          Imaging: YES x-ray and MRI     Previous Treatment Approaches:          Antiinflammatory topical cream    Ambulatory/Rehab Services H2 Model Falls Risk Assessment   Risk Factors:       (1)  Gender [Male] Ability to Rise from Chair:       (4)  Unable to rise without assistance   Falls Prevention Plan:       No modifications necessary   Total: (5 or greater = High Risk): 1   ©2010 [facility] of "[employer]. All Rights Reserved. [facility] Patent #[id number]. Federal Law prohibits the replication, distribution or use without written permission from [facility] [employer]   Current Medications:       Current Outpatient Medications:     diclofenac (VOLTAREN) 1 % gel, Apply  to affected area four (4) times daily. , Disp: 100 g, Rfl: 5    tamsulosin (FLOMAX) 0.4 mg capsule, Take 1 Cap by mouth daily. , Disp: 90 Cap, Rfl: 3    atorvastatin (LIPITOR) 10 mg tablet, Take 1 Tab by mouth daily. , Disp: 90 Tab, Rfl: 3    lisinopril (PRINIVIL, ZESTRIL) 20 mg tablet, Take 1 Tab by mouth daily. , Disp: 90 Tab, Rfl: 3    aspirin delayed-release 81 mg tablet, Take  by mouth daily. , Disp: , Rfl:    Date Last Reviewed:  7/12/2019     EXAMINATION:   Observation/Orthostatic Postural Assessment:          Standing: Forward Head, Rounded Shoulders, right shoulder elevated, Thoracic Kyphosis, Decreased Lumbar Lordosis        Sitting:  Forward Head, Rounded Shoulders, Thoracic Kyphosis, Decreased Lumbar Lordosis, right shoulder elevated     Palpation:    ·     Increased tone of right upper trapezius, pectorals  ·     Mild Atrophy of mid and lower trapezius   ·     Point tender of bicipital grove    ROM:    Shoulder DATE  5/29/19 DATE  7/12/19   Shoulder flexion R: 140   L: 165 R: 160  L:  165   Shoulder abd R: 140   L: 165 R: 160  L: 165   shoulder External rotation  R: 80 @60 abd  L: 80 @ 60 abd  WNL B,      shoulder Internal rotation R: 45 @ 60 abd cs  L: 60 @ 60 abd cs  R: WNL B mild pain at end range   L:     Elbow flex/ext  R: WNL   L: WNL R: WNL  L: WNL       Strength:       DATE  5/29/19 DATE  7/12/19   Shoulder flexion R: 4/5 cs  L: 5/5  R: 4+/5 mild pain   L: 5/5    Shoulder abd R: 4+/5 cs  L: 5/5 R: 5/5   L: 5/5    shoulder External rotation  R: 4/5 cs  L: 5/5 R: 4+/5  L: 5/5    Shoulder Internal rotation R: 5/5   L: 5/5 R: 5/5  L: 5/5   Elbow flexion  R: 4+/5  L: 5/5 R: 5/5  L: 5/5   Elbow extension  R: 4+/5   L:5/5  R: 5/5  L:5/5       Neurological Screen:   intact           Functional Mobility:         Gait/Ambulation: no abnormalities        Transfers:  No use of UE sit to stand         Functional reach: limited overhead and behind back R     Balance:          No abnormalities

## 2020-06-25 ENCOUNTER — HOSPITAL ENCOUNTER (OUTPATIENT)
Dept: MRI IMAGING | Age: 70
Discharge: HOME | End: 2020-06-25
Attending: UROLOGY
Payer: MEDICARE

## 2020-06-25 DIAGNOSIS — R97.20 ELEVATED PSA: ICD-10-CM

## 2020-06-25 PROCEDURE — 72197 MRI PELVIS W/O & W/DYE: CPT

## 2020-06-25 PROCEDURE — 74011250636 HC RX REV CODE- 250/636: Performed by: UROLOGY

## 2020-06-25 PROCEDURE — 74011000258 HC RX REV CODE- 258: Performed by: UROLOGY

## 2020-06-25 PROCEDURE — A9575 INJ GADOTERATE MEGLUMI 0.1ML: HCPCS | Performed by: UROLOGY

## 2020-06-25 RX ORDER — SODIUM CHLORIDE 0.9 % (FLUSH) 0.9 %
10 SYRINGE (ML) INJECTION
Status: COMPLETED | OUTPATIENT
Start: 2020-06-25 | End: 2020-06-25

## 2020-06-25 RX ORDER — GADOTERATE MEGLUMINE 376.9 MG/ML
21 INJECTION INTRAVENOUS
Status: COMPLETED | OUTPATIENT
Start: 2020-06-25 | End: 2020-06-25

## 2020-06-25 RX ADMIN — GADOTERATE MEGLUMINE 21 ML: 376.9 INJECTION INTRAVENOUS at 21:16

## 2020-06-25 RX ADMIN — SODIUM CHLORIDE 100 ML: 900 INJECTION, SOLUTION INTRAVENOUS at 21:16

## 2020-06-25 RX ADMIN — Medication 10 ML: at 21:16

## 2021-01-14 PROBLEM — R07.2 PRECORDIAL PAIN: Status: ACTIVE | Noted: 2021-01-14

## 2021-02-05 PROBLEM — R94.30 ABNORMAL CARDIAC FUNCTION TEST: Status: ACTIVE | Noted: 2021-02-05

## 2021-02-26 PROBLEM — I49.3 PVC (PREMATURE VENTRICULAR CONTRACTION): Status: ACTIVE | Noted: 2021-02-26

## 2021-02-26 PROBLEM — I71.20 THORACIC AORTIC ANEURYSM WITHOUT RUPTURE: Status: ACTIVE | Noted: 2021-02-26

## 2021-03-08 ENCOUNTER — HOSPITAL ENCOUNTER (OUTPATIENT)
Dept: CT IMAGING | Age: 71
Discharge: HOME | End: 2021-03-08
Attending: INTERNAL MEDICINE

## 2021-03-08 DIAGNOSIS — I71.20 THORACIC AORTIC ANEURYSM WITHOUT RUPTURE: ICD-10-CM

## 2021-03-08 RX ORDER — SODIUM CHLORIDE 0.9 % (FLUSH) 0.9 %
10 SYRINGE (ML) INJECTION
Status: COMPLETED | OUTPATIENT
Start: 2021-03-08 | End: 2021-03-08

## 2021-03-08 RX ADMIN — Medication 10 ML: at 09:34

## 2021-03-08 NOTE — PROGRESS NOTES
Called and informed pt of abnormal CT results.  Pt voiced understanding and agreed to keep his follow up appointment on 03-30-21./wc

## 2021-04-08 ENCOUNTER — HOSPITAL ENCOUNTER (OUTPATIENT)
Dept: LAB | Age: 71
Discharge: HOME | End: 2021-04-08
Payer: MEDICARE

## 2021-04-08 DIAGNOSIS — I10 ESSENTIAL HYPERTENSION: ICD-10-CM

## 2021-04-08 LAB
ANION GAP SERPL CALC-SCNC: 4 MMOL/L (ref 7–16)
BUN SERPL-MCNC: 20 MG/DL (ref 8–23)
CALCIUM SERPL-MCNC: 9.1 MG/DL (ref 8.3–10.4)
CHLORIDE SERPL-SCNC: 109 MMOL/L (ref 98–107)
CO2 SERPL-SCNC: 26 MMOL/L (ref 21–32)
CREAT SERPL-MCNC: 0.93 MG/DL (ref 0.8–1.5)
GLUCOSE SERPL-MCNC: 110 MG/DL (ref 65–100)
POTASSIUM SERPL-SCNC: 4 MMOL/L (ref 3.5–5.1)
SODIUM SERPL-SCNC: 139 MMOL/L (ref 138–145)

## 2021-04-08 PROCEDURE — 80048 BASIC METABOLIC PNL TOTAL CA: CPT

## 2021-04-08 PROCEDURE — 36415 COLL VENOUS BLD VENIPUNCTURE: CPT

## 2021-04-15 NOTE — PROGRESS NOTES
Called pt and informed of normal labs and potassium level looks great. Continue meds as currently taking.  Pt voiced understanding./wc

## 2021-06-03 ENCOUNTER — HOSPITAL ENCOUNTER (OUTPATIENT)
Dept: NUCLEAR MEDICINE | Age: 71
Discharge: HOME | End: 2021-06-03
Attending: UROLOGY
Payer: MEDICARE

## 2021-06-03 DIAGNOSIS — M89.9 BONE LESION: ICD-10-CM

## 2021-06-03 PROCEDURE — 78306 BONE IMAGING WHOLE BODY: CPT

## 2021-06-30 ENCOUNTER — HOSPITAL ENCOUNTER (OUTPATIENT)
Dept: CT IMAGING | Age: 71
Discharge: HOME | End: 2021-06-30
Attending: UROLOGY
Payer: MEDICARE

## 2021-06-30 DIAGNOSIS — R97.20 ELEVATED PSA: ICD-10-CM

## 2021-06-30 DIAGNOSIS — R93.0 ABNORMAL FINDINGS ON DIAGNOSTIC IMAGING OF SKULL AND HEAD, NOT ELSEWHERE CLASSIFIED: ICD-10-CM

## 2021-06-30 LAB — CREAT BLD-MCNC: 0.98 MG/DL (ref 0.8–1.5)

## 2021-06-30 PROCEDURE — 74011000636 HC RX REV CODE- 636: Performed by: UROLOGY

## 2021-06-30 PROCEDURE — 82565 ASSAY OF CREATININE: CPT

## 2021-06-30 PROCEDURE — 74011000258 HC RX REV CODE- 258: Performed by: UROLOGY

## 2021-06-30 PROCEDURE — 70460 CT HEAD/BRAIN W/DYE: CPT

## 2021-06-30 RX ORDER — SODIUM CHLORIDE 0.9 % (FLUSH) 0.9 %
10 SYRINGE (ML) INJECTION
Status: COMPLETED | OUTPATIENT
Start: 2021-06-30 | End: 2021-06-30

## 2021-06-30 RX ADMIN — SODIUM CHLORIDE 100 ML: 900 INJECTION, SOLUTION INTRAVENOUS at 16:00

## 2021-06-30 RX ADMIN — Medication 10 ML: at 16:00

## 2021-06-30 RX ADMIN — IOPAMIDOL 100 ML: 755 INJECTION, SOLUTION INTRAVENOUS at 16:00

## 2021-07-27 PROBLEM — Z11.59 NEED FOR HEPATITIS C SCREENING TEST: Status: ACTIVE | Noted: 2021-07-27

## 2021-07-27 PROBLEM — R94.30 ABNORMAL CARDIAC FUNCTION TEST: Status: RESOLVED | Noted: 2021-02-05 | Resolved: 2021-07-27

## 2021-07-27 PROBLEM — Z23 ENCOUNTER FOR IMMUNIZATION: Status: ACTIVE | Noted: 2021-07-27

## 2021-07-27 PROBLEM — K63.5 COLON POLYPS: Status: ACTIVE | Noted: 2021-07-27

## 2021-07-27 PROBLEM — Z00.00 MEDICARE ANNUAL WELLNESS VISIT, SUBSEQUENT: Status: ACTIVE | Noted: 2021-07-27

## 2021-07-27 PROBLEM — N40.2 PROSTATE NODULE: Status: RESOLVED | Noted: 2018-01-03 | Resolved: 2021-07-27

## 2021-11-17 ENCOUNTER — ANESTHESIA EVENT (OUTPATIENT)
Dept: ENDOSCOPY | Age: 71
End: 2021-11-17
Payer: MEDICARE

## 2021-11-17 RX ORDER — SODIUM CHLORIDE, SODIUM LACTATE, POTASSIUM CHLORIDE, CALCIUM CHLORIDE 600; 310; 30; 20 MG/100ML; MG/100ML; MG/100ML; MG/100ML
100 INJECTION, SOLUTION INTRAVENOUS CONTINUOUS
Status: CANCELLED | OUTPATIENT
Start: 2021-11-17

## 2021-11-17 NOTE — H&P
[address]  [phone]    H&P Note   Amilcar Quiroga   MRN: [id number]     : 1950        HPI: Amilcar Quiroga is a 65 y.o. male who presents for colonoscopy via direct scheduling. Patient was given a 1 day prep. The patient is not on anticoagulation. Prior colonoscopies:  +R colon polyps x 3 (TA and TVA)  FH: negative for colon cancer, colon polyps, GI cancers, IBD    Past Medical History:   Diagnosis Date    BPH associated with nocturia 10/10/2011    Calculus of kidney     Chronic otitis media     Conductive hearing loss     COVID-19 vaccine series completed 2021    Pfizer; 2021     Deviated nasal septum     Dysfunction of both eustachian tubes     Elevated PSA 2017    External hemorrhoids     Hearing loss     Hyperlipidemia 10/10/2011    Hypertension 10/10/2011    managed wtih med    Mixed hearing loss, bilateral 2012    Mixed hyperlipidemia 10/10/2011    Obesity (BMI 30.0-34.9) 2017    Perforation of left tympanic membrane     Right chronic serous otitis media     SNHL (sensorineural hearing loss)      Past Surgical History:   Procedure Laterality Date    HX COLONOSCOPY  2014    Hyperplastic Polyps, diverticulosis - Repeat in 7 years    HX MYRINGOTOMY Bilateral 2016    with U-tube placement     Current Facility-Administered Medications   Medication Dose Route Frequency    lactated Ringers infusion  100 mL/hr IntraVENous CONTINUOUS     ALLERGIES:  Patient has no known allergies. Social History     Socioeconomic History    Marital status:    Tobacco Use    Smoking status: Former Smoker     Quit date: 1980     Years since quittin.9    Smokeless tobacco: Never Used   Vaping Use    Vaping Use: Never used   Substance and Sexual Activity    Alcohol use:  Yes     Alcohol/week: 7.0 standard drinks     Types: 7 Glasses of wine per week    Drug use: No    Sexual activity: Yes     Partners: Female     Social History     Tobacco Use   Smoking Status Former Smoker    Quit date:     Years since quittin.9   Smokeless Tobacco Never Used     Family History   Problem Relation Age of Onset    Other Brother         COLITIS       ROS: The patient has no difficulty with chest pain or shortness of breath. No fever or chills. The patient denies any personal or family history of abnormal clotting or bleeding. Comprehensive review of systems was otherwise unremarkable except as noted above. Physical Exam:   Constitutional: Alert oriented cooperative patient in no acute distress. Visit Vitals  /79   Pulse 83   Temp 97.2 °F (36.2 °C)   Resp 18   SpO2 96%     Eyes:Sclera are clear without icterus. ENMT: no obvious neck masses, no ear or lip lesions  CV: RRR. Normal perfusion  Resp: No JVD. Breathing is  non-labored. GI: soft and non-distended     Musculoskeletal: unremarkable with normal function. Neuro:  No obvious focal deficits  Psychiatric: normal affect and mood, no memory impairment    Lab Results   Component Value Date/Time    WBC 5.2 2019 09:37 AM    HGB 14.2 2019 09:37 AM    HCT 39.8 2019 09:37 AM    PLATELET 427  09:37 AM    MCV 89 2019 09:37 AM       Lab Results   Component Value Date/Time    Sodium 139 2021 09:26 AM    Potassium 4.0 2021 09:26 AM    Chloride 109 (H) 2021 09:26 AM    CO2 26 2021 09:26 AM    BUN 20 2021 09:26 AM    Creatinine 0.93 2021 09:26 AM    Glucose 110 (H) 2021 09:26 AM    Bilirubin, total 0.7 2021 11:29 AM    ALT (SGPT) 32 2021 11:29 AM    Alk. phosphatase 76 2021 11:29 AM       Assessment/Plan:     Sarah Hensley is a 65 y.o. male who presents for colonoscopy via direct scheduling. The patient is appropriate for the adult colonoscope.   We discussed proceeding with colonoscopy. I discussed the patient's condition and treatment options with the patient. I discussed risks of colonoscopy in language the patient could understand including bleeding, infection, aspiration, perforation, medication reaction, need for further endoscopy or surgery, abscess, fistula, SBO, DVT, PE, heart attack, stroke, renal failure, respiratory failure, ventilatory dependence, and death. The patient voiced understanding of all this and all questions were answered. Alternatives to colonoscopy were discussed also and risks of the alternatives. The patient requested that we proceed with colonoscopy. Informed consent was obtained. Problem List  Date Reviewed: 10/29/2021          Codes Class Noted    Colon polyps ICD-10-CM: K63.5  ICD-9-CM: 211.3  7/27/2021    Overview Signed 7/27/2021 11:17 AM by Bert Canales MD     5/2014 colonoscopy - 3 adenomatous polyps removed. Patient is overdue for a surveillance colonoscopy. Referral placed. Medicare annual wellness visit, subsequent ICD-10-CM: Z00.00  ICD-9-CM: V70.0  7/27/2021    Overview Signed 7/27/2021 11:19 AM by Bert Canales MD     1/11/21              Encounter for immunization ICD-10-CM: Myra Deter  ICD-9-CM: V03.89  7/27/2021    Overview Signed 7/27/2021 11:20 AM by Bert Canales MD     Reviewed / discussed vaccinations. Offered him Shingrix which he declines. Offered him Tdap which he declines. Need for hepatitis C screening test ICD-10-CM: Z11.59  ICD-9-CM: V73.89  7/27/2021    Overview Signed 7/27/2021 11:20 AM by Bert Canales MD     Ordered with next labs. Thoracic aortic aneurysm without rupture ([facility] [address] ICD-10-CM: I71.2  ICD-9-CM: 441.2  2/26/2021    Overview Addendum 7/27/2021 11:08 AM by Bert Canales MD     Followed by cardiology Dr. Birder Fothergill. 3/8/21 CTA chest - 4.4 cm maximal diameter thoracic ascending aorta. 2/18/21 echo - mild cLVH / diastolic dysfunction. EF 56%,  Severe LAE.   Dilated ascending aorta.     Plan to monitor over time. PVC (premature ventricular contraction) ICD-10-CM: I49.3  ICD-9-CM: 427.69  2/26/2021        Precordial pain ICD-10-CM: R07.2  ICD-9-CM: 786.51  1/14/2021    Overview Signed 7/27/2021 11:13 AM by Akash Yo MD     1/15/21 exercise nuclear stress - 7 METS/88% MHR normal perfusion. Elevated PSA ICD-10-CM: R97.20  ICD-9-CM: 790.93  5/22/2017    Overview Signed 7/27/2021 11:11 AM by Akash Yo MD     Followed by urology Dr. Bridgett Luo. 5/19/21 PSA 3.9.    12/2/20 PSA 5.2.   1/15/18 prostate biopsy negative. Obesity (BMI 30.0-34.9) ICD-10-CM: E66.9  ICD-9-CM: 278.00  5/22/2017    Overview Signed 7/27/2021 11:12 AM by Akash Yo MD     11/11/19 TSH 1.430    Reviewed the patient's BMI. Discussed the need to lose weight in order to avoid many preventable illnesses. Discussed diet, exercise, and weight loss strategies. Hypertension ICD-10-CM: I10  ICD-9-CM: 401.9  10/10/2011    Overview Signed 7/27/2021 11:06 AM by Akash Yo MD     Well controlled on current medications (carvedilol 25 mg po BID, lisinopril 20 mg daily, aldactone 25 mg daily). He states that he noted mild nipple sensitivity on the aldactone but does not find it bothersome enough to warrant stopping the aldactone. No gynecomastia. Continue current medications. Monitor clinically. BPH associated with nocturia ICD-10-CM: N40.1, R35.1  ICD-9-CM: 600.01, 788.43  10/10/2011    Overview Addendum 7/27/2021 11:35 AM by Akash Yo MD     Symptoms improved with Flomax. PSA followed by urology Dr. Bridgett Luo. Hyperlipidemia ICD-10-CM: E78.5  ICD-9-CM: 272.4  10/10/2011    Overview Signed 7/27/2021 11:10 AM by Akash Yo MD     1/11/21 total 137, HDL 56, LDL 65, trig 83 on atorvastatin 10 mg daily.       Reviewed the most recent lipid panel with the patient, and interpreted the results within the context of the patient's personal cardiovascular risk factors. Discussed/reinforced a low-cholesterol diet. The patient will continue current intensity statin therapy.                         Meryl Gomez MD,  FACS

## 2021-11-18 ENCOUNTER — ANESTHESIA (OUTPATIENT)
Dept: ENDOSCOPY | Age: 71
End: 2021-11-18
Payer: MEDICARE

## 2021-11-18 ENCOUNTER — HOSPITAL ENCOUNTER (OUTPATIENT)
Age: 71
Setting detail: OUTPATIENT SURGERY
Discharge: HOME | End: 2021-11-18
Attending: SURGERY | Admitting: SURGERY
Payer: MEDICARE

## 2021-11-18 VITALS
RESPIRATION RATE: 20 BRPM | OXYGEN SATURATION: 99 % | TEMPERATURE: 98 F | SYSTOLIC BLOOD PRESSURE: 162 MMHG | DIASTOLIC BLOOD PRESSURE: 86 MMHG | HEART RATE: 86 BPM

## 2021-11-18 DIAGNOSIS — Z86.010 HX OF ADENOMATOUS COLONIC POLYPS: ICD-10-CM

## 2021-11-18 DIAGNOSIS — I48.0 PAROXYSMAL A-FIB (HCC): ICD-10-CM

## 2021-11-18 DIAGNOSIS — E78.00 PURE HYPERCHOLESTEROLEMIA: ICD-10-CM

## 2021-11-18 DIAGNOSIS — I71.20 THORACIC AORTIC ANEURYSM WITHOUT RUPTURE: ICD-10-CM

## 2021-11-18 LAB
ANION GAP SERPL CALC-SCNC: 6 MMOL/L (ref 7–16)
ATRIAL RATE: 104 BPM
BUN SERPL-MCNC: 18 MG/DL (ref 8–23)
CALCIUM SERPL-MCNC: 9.2 MG/DL (ref 8.3–10.4)
CALCULATED R AXIS, ECG10: 66 DEGREES
CALCULATED T AXIS, ECG11: -67 DEGREES
CHLORIDE SERPL-SCNC: 103 MMOL/L (ref 98–107)
CO2 SERPL-SCNC: 26 MMOL/L (ref 21–32)
CREAT SERPL-MCNC: 1.05 MG/DL (ref 0.8–1.5)
DIAGNOSIS, 93000: NORMAL
GLUCOSE SERPL-MCNC: 118 MG/DL (ref 65–100)
MAGNESIUM SERPL-MCNC: 2.2 MG/DL (ref 1.8–2.4)
POTASSIUM BLD-SCNC: 4.4 MMOL/L (ref 3.5–5.1)
POTASSIUM SERPL-SCNC: 5.4 MMOL/L (ref 3.5–5.1)
Q-T INTERVAL, ECG07: 382 MS
QRS DURATION, ECG06: 96 MS
QTC CALCULATION (BEZET), ECG08: 451 MS
SODIUM SERPL-SCNC: 135 MMOL/L (ref 138–145)
VENTRICULAR RATE, ECG03: 84 BPM

## 2021-11-18 PROCEDURE — 2709999900 HC NON-CHARGEABLE SUPPLY: Performed by: SURGERY

## 2021-11-18 PROCEDURE — 99213 OFFICE O/P EST LOW 20 MIN: CPT | Performed by: INTERNAL MEDICINE

## 2021-11-18 PROCEDURE — 74011250636 HC RX REV CODE- 250/636: Performed by: ANESTHESIOLOGY

## 2021-11-18 PROCEDURE — 83735 ASSAY OF MAGNESIUM: CPT

## 2021-11-18 PROCEDURE — 76060000032 HC ANESTHESIA 0.5 TO 1 HR: Performed by: SURGERY

## 2021-11-18 PROCEDURE — G0105 COLORECTAL SCRN; HI RISK IND: HCPCS | Performed by: SURGERY

## 2021-11-18 PROCEDURE — 74011250636 HC RX REV CODE- 250/636: Performed by: NURSE ANESTHETIST, CERTIFIED REGISTERED

## 2021-11-18 PROCEDURE — 84132 ASSAY OF SERUM POTASSIUM: CPT

## 2021-11-18 PROCEDURE — 76040000026: Performed by: SURGERY

## 2021-11-18 PROCEDURE — 93005 ELECTROCARDIOGRAM TRACING: CPT | Performed by: ANESTHESIOLOGY

## 2021-11-18 PROCEDURE — 80048 BASIC METABOLIC PNL TOTAL CA: CPT

## 2021-11-18 RX ORDER — PROPOFOL 10 MG/ML
INJECTION, EMULSION INTRAVENOUS
Status: DISCONTINUED | OUTPATIENT
Start: 2021-11-18 | End: 2021-11-18 | Stop reason: HOSPADM

## 2021-11-18 RX ORDER — SODIUM CHLORIDE, SODIUM LACTATE, POTASSIUM CHLORIDE, CALCIUM CHLORIDE 600; 310; 30; 20 MG/100ML; MG/100ML; MG/100ML; MG/100ML
100 INJECTION, SOLUTION INTRAVENOUS CONTINUOUS
Status: DISCONTINUED | OUTPATIENT
Start: 2021-11-18 | End: 2021-11-18 | Stop reason: HOSPADM

## 2021-11-18 RX ORDER — PROPOFOL 10 MG/ML
INJECTION, EMULSION INTRAVENOUS AS NEEDED
Status: DISCONTINUED | OUTPATIENT
Start: 2021-11-18 | End: 2021-11-18 | Stop reason: HOSPADM

## 2021-11-18 RX ADMIN — PROPOFOL 200 MCG/KG/MIN: 10 INJECTION, EMULSION INTRAVENOUS at 11:06

## 2021-11-18 RX ADMIN — PROPOFOL 60 MG: 10 INJECTION, EMULSION INTRAVENOUS at 11:06

## 2021-11-18 RX ADMIN — SODIUM CHLORIDE, SODIUM LACTATE, POTASSIUM CHLORIDE, AND CALCIUM CHLORIDE 100 ML/HR: 600; 310; 30; 20 INJECTION, SOLUTION INTRAVENOUS at 11:00

## 2021-11-18 RX ADMIN — PROPOFOL 20 MG: 10 INJECTION, EMULSION INTRAVENOUS at 11:08

## 2021-11-18 NOTE — H&P
[address] Cardiology Initial Cardiac Evaluation      Date of  Admission: 11/18/2021  9:23 AM     Primary Care Physician: Sesar Diaz MD  Primary Cardiologist: Dr Tasia Crawford  Referring Physician: Dr Kavin Wen  Supervising Physician: Dr Tasia Crawford    CC/Reason for evaluation: new onset atrial fibrillation     HPI:  Maximiliano Sanchez is a 65 y.o. male with PMH of thoracic aortic aneurysm, PVC's, HTN, HLD, obesity who presented to [facility] for outpatient screening colonoscopy. Patient was noted to be in atrial fibrillation during the procedure therefore Cardiology was consulted for further evaluation. Patient denies any history of atrial fibrillation. Denies any palpitations, SOB, dizziness, or lightheadedness.       Past Medical History:   Diagnosis Date    BPH associated with nocturia 10/10/2011    Calculus of kidney     Chronic otitis media     Conductive hearing loss     COVID-19 vaccine series completed 03/12/2021    Pf[employer]; 2/19/2021     Deviated nasal septum     Dysfunction of both eustachian tubes     Elevated PSA 5/22/2017    External hemorrhoids     Hearing loss     Hyperlipidemia 10/10/2011    Hypertension 10/10/2011    managed wtih med    Mixed hearing loss, bilateral 4/22/2012    Mixed hyperlipidemia 10/10/2011    Obesity (BMI 30.0-34.9) 5/22/2017    Perforation of left tympanic membrane     Right chronic serous otitis media     SNHL (sensorineural hearing loss)       Past Surgical History:   Procedure Laterality Date    HX COLONOSCOPY  05/21/2014    Hyperplastic Polyps, diverticulosis - Repeat in 7 years    HX MYRINGOTOMY Bilateral 01/25/2016    with U-tube placement       No Known Allergies   Social History     Socioeconomic History    Marital status:      Spouse name: Not on file    Number of children: Not on file    Years of education: Not on file    Highest education level: Not on file   Occupational History    Not on file   Tobacco Use    Smoking status: Former Smoker     Quit date: 34     Years since quittin.9    Smokeless tobacco: Never Used   Vaping Use    Vaping Use: Never used   Substance and Sexual Activity    Alcohol use: Yes     Alcohol/week: 7.0 standard drinks     Types: 7 Glasses of wine per week    Drug use: No    Sexual activity: Yes     Partners: Female   Other Topics Concern    Not on file   Social History Narrative    Not on file     Social Determinants of Health     Financial Resource Strain:     Difficulty of Paying Living Expenses: Not on file   Food Insecurity:     Worried About Running Out of Food in the Last Year: Not on file    Emre of Food in the Last Year: Not on file   Transportation Needs:     Lack of Transportation (Medical): Not on file    Lack of Transportation (Non-Medical):  Not on file   Physical Activity:     Days of Exercise per Week: Not on file    Minutes of Exercise per Session: Not on file   Stress:     Feeling of Stress : Not on file   Social Connections:     Frequency of Communication with Friends and Family: Not on file    Frequency of Social Gatherings with Friends and Family: Not on file    Attends Restoration Services: Not on file    Active Member of [address] [place] [address] or Organizations: Not on file    Attends Club or Organization Meetings: Not on file    Marital Status: Not on file   Intimate Partner Violence:     Fear of Current or Ex-Partner: Not on file    Emotionally Abused: Not on file    Physically Abused: Not on file    Sexually Abused: Not on file   Housing Stability:     Unable to Pay for Housing in the Last Year: Not on file    Number of [place] in the Last Year: Not on file    Unstable Housing in the Last Year: Not on file     Family History   Problem Relation Age of Onset    Other Brother         COLITIS        Current Facility-Administered Medications   Medication Dose Route Frequency    lactated Ringers infusion  100 mL/hr IntraVENous CONTINUOUS       Review of Symptoms:    General: No weight changes, weakness, fever or chills  Skin: no rashes, lumps, or other skin changes  HEENT: no headache, dizziness, lightheadedness, vision changes, hearing changes, tinnitus, vertigo, sinus pressure/pain, bleeding gums, sore throat, or hoarseness  Neck: no swollen glands, goiter, pain or stiffness  Respiratory: [place], sputum, hemoptysis, dyspnea, wheezing  Cardiovascular: + as per HPI  Gastrointestinal: no GERD, constipation, diarrhea, liver problems, or h/o GI bleed  Urinary: no frequency, urgency , hematuria, burning/pain with urination, recent flank pain, polyuria, nocturia, or difficulty urinating  Peripheral Vascular: no claudication, leg cramps, prior DVTs, swelling of calves, legs, or feet, color change, or swelling with redness or tenderness  Musculoskeletal: no muscle or joint pain/stiffness, joint swelling, erythema of joints, or back pain  Psychiatric: no depression or excessive stress  Neurological: no sensory or motor loss, seizures, syncope, tremors, numbness, no dementia  Hematologic: no anemia, easy bruising or bleeding  Endocrine: No thyroid problems, heat or cold intolerance, excessive sweating, polyuria, polydipsia, diabetes.        Subjective:     Physical Exam:    Vitals:    11/18/21 1235 11/18/21 1300 11/18/21 1330 11/18/21 1400   BP: 118/81 (!) 153/82 136/80 (!) 156/84   Pulse: 79 79 84 85   Resp: 18 18 18 18   Temp:       SpO2: 98% 99% 99% 99%       General: Well Developed, Well Nourished, No Acute Distress  HEENT: pupils equal and round, no abnormalities noted  Neck: supple, no JVD, no carotid bruits  Heart: IRR without murmurs or gallops  Lungs: Clear throughout auscultation bilaterally without adventitious sounds  Abd: soft, nontender, nondistended, with good bowel sounds  Ext: warm, no edema, calves supple/nontender, pulses 2+ bilaterally  Skin: warm and dry  Psychiatric: Normal mood and affect  Neurologic: Alert and oriented X 3    Cardiographics    Telemetry: AFIB  ECG: atrial fibrillation, rate 84  Echocardiogram: 2/18/2021- LVEF 55-60%, Aortic root is dilated, LAE as well      Labs:   Recent Results (from the past 24 hour(s))   EKG, 12 LEAD, INITIAL    Collection Time: 11/18/21 12:40 PM   Result Value Ref Range    Ventricular Rate 84 BPM    Atrial Rate 104 BPM    QRS Duration 96 ms    Q-T Interval 382 ms    QTC Calculation (Bezet) 451 ms    Calculated R Axis 66 degrees    Calculated T Axis -67 degrees    Diagnosis       Atrial fibrillation with premature ventricular or aberrantly conducted   complexes  Nonspecific T wave abnormality  Abnormal ECG  No previous ECGs available       Pt has been seen and examined by Dr. Radha Veliz. He agrees with the following assessment and plan. Assessment/Plan:      New onset atrial fibrillation  - asymptomatic   - no prior documentation of atrial fibrillation   - check BMP, Mag   - rate currently controlled   - continue BB  - start Xarelto 20 mg daily   - follow up with Dr Radha Veliz on December 2nd at 3:00 pm in [facility]   - if labs ok can be discharged home from cardiology standpoint    PVC's  - continue carvedilol    HTN  - continue lisinopril, carvedilol    HLD  - continue atorvastatin     Thank you for requesting cardiac evaluation and allowing us to participate in the care of this patient. We will continue to follow along with you.       Siobhan Meyer PA-C  Supervising Physician: Dr Radha Veliz

## 2021-11-18 NOTE — PROGRESS NOTES
Spoke with Brandon Balderas from Cardiology. Potassium hemolyzed. Recheck with POC- potassium 4.4. PA okay with patient discharging home and starting on blood thinner outpatient. Patient follow up appointment with Dr. Shashank Otero relayed to patient and spouse.

## 2021-11-18 NOTE — ANESTHESIA POSTPROCEDURE EVALUATION
Procedure(s):  COLONOSCOPY/ 32.    total IV anesthesia    Anesthesia Post Evaluation      Multimodal analgesia: multimodal analgesia used between 6 hours prior to anesthesia start to PACU discharge  Patient location during evaluation: bedside  Patient participation: complete - patient participated  Level of consciousness: awake  Pain management: adequate  Airway patency: patent  Anesthetic complications: no  Cardiovascular status: acceptable  Respiratory status: spontaneous ventilation and acceptable  Hydration status: acceptable  Comments: Pt noted to be in AFib during the procedure. Rate controlled. Contacted cardiology for a consult so that he can have some follow-up. He does have a h/o PVCs, but I could not find any h/o AFib. Likely dehydrated from colonic prep, so will draw electrolytes.   Post anesthesia nausea and vomiting:  none      INITIAL Post-op Vital signs:   Vitals Value Taken Time   /82 11/18/21 1300   Temp 36.7 °C (98 °F) 11/18/21 1142   Pulse 79 11/18/21 1300   Resp 18 11/18/21 1300   SpO2 99 % 11/18/21 1300

## 2021-11-18 NOTE — ANESTHESIA PREPROCEDURE EVALUATION
Anesthetic History   No history of anesthetic complications            Review of Systems / Medical History  Patient summary reviewed and pertinent labs reviewed    Pulmonary  Within defined limits                 Neuro/Psych   Within defined limits           Cardiovascular    Hypertension        Dysrhythmias : PVC  Hyperlipidemia    Exercise tolerance: >4 METS     GI/Hepatic/Renal  Within defined limits              Endo/Other        Obesity     Other Findings              Physical Exam    Airway  Mallampati: II  TM Distance: > 6 cm  Neck ROM: normal range of motion   Mouth opening: Normal     Cardiovascular  Regular rate and rhythm,  S1 and S2 normal,  no murmur, click, rub, or gallop             Dental    Dentition: Full upper dentures and Lower partial plate     Pulmonary  Breath sounds clear to auscultation               Abdominal  GI exam deferred       Other Findings            Anesthetic Plan    ASA: 2  Anesthesia type: total IV anesthesia          Induction: Intravenous  Anesthetic plan and risks discussed with: Patient and Spouse

## 2021-11-18 NOTE — DISCHARGE INSTRUCTIONS
Gastrointestinal Colonoscopy/Flexible Sigmoidoscopy - Lower Exam Discharge Instructions  1. Call Dr. Abad Narayan at [phone]  for any problems or questions. 2. Contact the doctors office for follow up appointment as directed  3. Medication may cause drowsiness for several hours, therefore:  · Do not drive or operate machinery for reminder of the day. · No alcohol today. · Do not make any important or legal decisions for 24 hours. · Do not sign any legal documents for 24 hours. 4. Ordinarily, you may resume regular diet and activity after exam unless otherwise specified by your physician. 5. Because of air put into your colon during exam, you may experience some abdominal distension, relieved by the passage of gas, for several hours. 6. Contact your physician if you have any of the following:  · Excessive amount of bleeding - large amount when having a bowel movement. Occasional specks of blood with bowel movement would not be unusual.  · Severe abdominal pain  · Fever or Chills    Any additional instructions:      - Repeat colonoscopy in 5 years  - Follow up with PCP as normally scheduled       Instructions given to Hong Webb and other family members.

## 2021-11-18 NOTE — PROGRESS NOTES
Consult made to Dr. Radha Veliz who is patients primary cardiologist. Dr. Radha Veliz and rBandon Pérez at bedside with patient. New orders for STAT BMP and Mag prior to patient d/c home. Will relay results to cardiology so discharge can be complete. Patient resting, asymptomatic for a.fib.

## 2021-11-24 NOTE — PROCEDURES
[address] [facility]  [phone]    Colonoscopy Procedure Note    Name: Sujata Arora     Date: 11/18/2021  Med Record Number: [id number]   Age: 65 y.o. Sex: male   Procedure: Colonoscopy --screening  Pre-operative Diagnosis:  Interval screening for colon cancer and History of colon polyps  Post-operative Diagnosis: Tortuous colon, extensive sigmoid diverticulosis, combined prolapsed internal/external hemorrhoids, atrial fibrillation  Indications: previous adenomatous polyp  Anesthesia/Sedation: MAC IV MAC anesthesia Propofol  Procedure Details:    Informed consent was obtained for the procedure, including sedation. Risks of perforation, hemorrhage, adverse drug reaction and aspiration were discussed. The patient was placed in the left lateral decubitus position. Based on the pre-procedure assessment, including review of the patient's medical history, medications, allergies, and review of systems, he had been deemed to be an appropriate candidate for sedation by the Anesthesia Dept. The patient was monitored continuously with ECG tracing, pulse oximetry, blood pressure monitoring, and direct observations. He was noted to be in atrial fibrillation with adequate rate control and perfusion. Cardiology consultation was obtained post procedure and he was started on Xarelto and electrolytes were checked. A time out was performed. Once sedation was adequate, a rectal examination was performed. The [id number] was inserted into the rectum and advanced under direct vision to the cecum, which was identified by the ileocecal valve and appendiceal orifice. The quality of the colonic preparation was good. The colon was very tortuous especially a redundant sigmoid colon requiring abdominal counterpressure to enter the cecum.   A careful inspection was made as the colonoscope was withdrawn, including a retroflexed view of the rectum; findings and interventions are described below. Appropriate photodocumentation was obtained. Findings: ANUS: Anal exam reveals no masses, sphincter tone is normal.   - Protruding lesions:  -External Hemorrhoids and -Internal Hemorrhoids  RECTUM: Rectal exam reveals no masses. - Protruding lesions:  -Prolapsed combined three column external Hemorrhoids and  -Internal Hemorrhoids  SIGMOID COLON: The mucosa is normal with good vascular pattern and without ulcers and polyps. - Excavated lesions:  -Extensive diverticulosis  DESCENDING COLON: The mucosa is normal with good vascular pattern and without ulcers, diverticula, and polyps. SPLENIC FLEXURE: The splenic flexure is normal.   TRANSVERSE COLON: The mucosa is normal with good vascular pattern and without ulcers, diverticula, and polyps. HEPATIC FLEXURE: The hepatic flexure is normal.   ASCENDING COLON: The mucosa is normal with good vascular pattern and without ulcers, diverticula, and polyps. CECUM: The appendiceal orifice appears normal. The ileocecal valve appears normal.   TERMINAL ILEUM: The terminal ileum was not entered. Specimens: None    Estimated Blood Loss:  0-minimum           Complications: None; patient tolerated the procedure well. Attending Attestation: I performed the procedure. Impression:  Otherwise normal colonoscopy to the cecum, See post-procedure diagnoses    Recommendations:-Repeat colonoscopy in 5 years (2026). , -Follow up with primary care physician on normal schedule. ,  -Xarelto started by cardiology for A. Fib.,  -Follow-up with cardiology    Selene Cartagena MD, FACS

## 2021-12-08 NOTE — PROGRESS NOTES
Patient pre-assessment complete for Nancy Allen scheduled for JOHN CVN, arrival time 1000. Patient verified using . Patient instructed to bring a list of all home medications on the day of procedure. NPO status reinforced. Instructed they can take all other medications excluding vitamins & supplements. Patient verbalizes understanding of all instructions & denies any questions at this time.

## 2021-12-09 ENCOUNTER — HOSPITAL ENCOUNTER (OUTPATIENT)
Dept: CARDIAC CATH/INVASIVE PROCEDURES | Age: 71
Discharge: HOME | End: 2021-12-09
Attending: INTERNAL MEDICINE | Admitting: INTERNAL MEDICINE
Payer: MEDICARE

## 2021-12-09 VITALS
WEIGHT: 232 LBS | HEIGHT: 71 IN | DIASTOLIC BLOOD PRESSURE: 72 MMHG | SYSTOLIC BLOOD PRESSURE: 118 MMHG | HEART RATE: 63 BPM | BODY MASS INDEX: 32.48 KG/M2 | OXYGEN SATURATION: 99 %

## 2021-12-09 DIAGNOSIS — I48.0 PAROXYSMAL ATRIAL FIBRILLATION (HCC): ICD-10-CM

## 2021-12-09 LAB
ANION GAP SERPL CALC-SCNC: 4 MMOL/L (ref 7–16)
ATRIAL RATE: 468 BPM
BUN SERPL-MCNC: 19 MG/DL (ref 8–23)
CALCIUM SERPL-MCNC: 9.2 MG/DL (ref 8.3–10.4)
CALCULATED R AXIS, ECG10: 6 DEGREES
CALCULATED T AXIS, ECG11: 71 DEGREES
CHLORIDE SERPL-SCNC: 107 MMOL/L (ref 98–107)
CO2 SERPL-SCNC: 27 MMOL/L (ref 21–32)
CREAT SERPL-MCNC: 0.98 MG/DL (ref 0.8–1.5)
DIAGNOSIS, 93000: NORMAL
ERYTHROCYTE [DISTWIDTH] IN BLOOD BY AUTOMATED COUNT: 12.2 % (ref 11.9–14.6)
GLUCOSE SERPL-MCNC: 106 MG/DL (ref 65–100)
HCT VFR BLD AUTO: 33.4 % (ref 41.1–50.3)
HGB BLD-MCNC: 10.8 G/DL (ref 13.6–17.2)
MAGNESIUM SERPL-MCNC: 2.2 MG/DL (ref 1.8–2.4)
MCH RBC QN AUTO: 30.9 PG (ref 26.1–32.9)
MCHC RBC AUTO-ENTMCNC: 32.3 G/DL (ref 31.4–35)
MCV RBC AUTO: 95.4 FL (ref 79.6–97.8)
NRBC # BLD: 0 K/UL (ref 0–0.2)
PLATELET # BLD AUTO: 180 K/UL (ref 150–450)
PMV BLD AUTO: 10.9 FL (ref 9.4–12.3)
POTASSIUM SERPL-SCNC: 4.4 MMOL/L (ref 3.5–5.1)
Q-T INTERVAL, ECG07: 404 MS
QRS DURATION, ECG06: 90 MS
QTC CALCULATION (BEZET), ECG08: 457 MS
RBC # BLD AUTO: 3.5 M/UL (ref 4.23–5.6)
SODIUM SERPL-SCNC: 138 MMOL/L (ref 138–145)
VENTRICULAR RATE, ECG03: 77 BPM
WBC # BLD AUTO: 5.9 K/UL (ref 4.3–11.1)

## 2021-12-09 PROCEDURE — 93005 ELECTROCARDIOGRAM TRACING: CPT | Performed by: INTERNAL MEDICINE

## 2021-12-09 PROCEDURE — 85027 COMPLETE CBC AUTOMATED: CPT

## 2021-12-09 PROCEDURE — 83735 ASSAY OF MAGNESIUM: CPT

## 2021-12-09 PROCEDURE — 74011000250 HC RX REV CODE- 250: Performed by: INTERNAL MEDICINE

## 2021-12-09 PROCEDURE — 93312 ECHO TRANSESOPHAGEAL: CPT

## 2021-12-09 PROCEDURE — 74011250636 HC RX REV CODE- 250/636: Performed by: INTERNAL MEDICINE

## 2021-12-09 PROCEDURE — 80048 BASIC METABOLIC PNL TOTAL CA: CPT

## 2021-12-09 PROCEDURE — 99152 MOD SED SAME PHYS/QHP 5/>YRS: CPT

## 2021-12-09 RX ORDER — MIDAZOLAM HYDROCHLORIDE 1 MG/ML
1-10 INJECTION, SOLUTION INTRAMUSCULAR; INTRAVENOUS AS NEEDED
Status: DISCONTINUED | OUTPATIENT
Start: 2021-12-09 | End: 2021-12-09 | Stop reason: HOSPADM

## 2021-12-09 RX ORDER — AMIODARONE HYDROCHLORIDE 150 MG/3ML
150 INJECTION, SOLUTION INTRAVENOUS
Status: DISCONTINUED | OUTPATIENT
Start: 2021-12-09 | End: 2021-12-09 | Stop reason: HOSPADM

## 2021-12-09 RX ORDER — LIDOCAINE HYDROCHLORIDE 20 MG/ML
15 SOLUTION OROPHARYNGEAL AS NEEDED
Status: DISCONTINUED | OUTPATIENT
Start: 2021-12-09 | End: 2021-12-09 | Stop reason: HOSPADM

## 2021-12-09 RX ORDER — AMIODARONE HYDROCHLORIDE 150 MG/3ML
300 INJECTION, SOLUTION INTRAVENOUS
Status: DISCONTINUED | OUTPATIENT
Start: 2021-12-09 | End: 2021-12-09

## 2021-12-09 RX ORDER — FENTANYL CITRATE 50 UG/ML
25-200 INJECTION, SOLUTION INTRAMUSCULAR; INTRAVENOUS AS NEEDED
Status: DISCONTINUED | OUTPATIENT
Start: 2021-12-09 | End: 2021-12-09 | Stop reason: HOSPADM

## 2021-12-09 RX ORDER — SODIUM CHLORIDE 9 MG/ML
75 INJECTION, SOLUTION INTRAVENOUS CONTINUOUS
Status: DISCONTINUED | OUTPATIENT
Start: 2021-12-09 | End: 2021-12-09 | Stop reason: HOSPADM

## 2021-12-09 RX ORDER — AMIODARONE HYDROCHLORIDE 200 MG/1
200 TABLET ORAL 2 TIMES DAILY
Qty: 60 TABLET | Refills: 5 | Status: SHIPPED | OUTPATIENT
Start: 2021-12-09 | End: 2022-01-03

## 2021-12-09 RX ADMIN — AMIODARONE HYDROCHLORIDE 150 MG: 50 INJECTION, SOLUTION INTRAVENOUS at 12:11

## 2021-12-09 RX ADMIN — MIDAZOLAM 1 MG: 1 INJECTION INTRAMUSCULAR; INTRAVENOUS at 11:57

## 2021-12-09 RX ADMIN — FENTANYL CITRATE 25 MCG: 50 INJECTION INTRAMUSCULAR; INTRAVENOUS at 11:53

## 2021-12-09 RX ADMIN — MIDAZOLAM 1 MG: 1 INJECTION INTRAMUSCULAR; INTRAVENOUS at 11:53

## 2021-12-09 RX ADMIN — FENTANYL CITRATE 50 MCG: 50 INJECTION INTRAMUSCULAR; INTRAVENOUS at 11:41

## 2021-12-09 RX ADMIN — MIDAZOLAM 2 MG: 1 INJECTION INTRAMUSCULAR; INTRAVENOUS at 11:41

## 2021-12-09 RX ADMIN — LIDOCAINE HYDROCHLORIDE 15 ML: 20 SOLUTION ORAL; TOPICAL at 11:38

## 2021-12-09 RX ADMIN — MIDAZOLAM 1 MG: 1 INJECTION INTRAMUSCULAR; INTRAVENOUS at 11:46

## 2021-12-09 NOTE — PROGRESS NOTES
Patient received to [address] [place] [address] room # 17  Ambulatory from [facility]. Patient scheduled for JOHN/CVN today with Dr Arielle Santiago. Procedure reviewed & questions answered, voiced good understanding consent obtained & placed on chart. All medications and medical history reviewed. Will prep patient per orders. Patient & family updated on plan of care.       The patient has a fraility score of 5-MILDLY FRAIL, based on age and abilities

## 2021-12-09 NOTE — PROGRESS NOTES
Discharge and follow up reviewed with pt and family at this time. Pt discharged to door via wheelchair.

## 2021-12-09 NOTE — DISCHARGE INSTRUCTIONS
AFTER YOU TRANSESOPHAGEAL ECHOCARDIOGRAM    Be sure someone else drives you home. You may feel drowsy for several hours. Do not eat or drink for at least two hours after your procedure. Your throat will be numb and there is a risk you might have difficulty swallowing for a while. Be careful when you do eat or drink for the first time especially with hot fluids since you could easily burn your throat. Call your doctor if:    · You are bleeding from your throat or mouth. · You have trouble breathing all of a sudden. · You have chest pain or any pain that spreads to your neck, jaw, or arms. · You have questions or concerns. · You have a fever greater than 101°F.    Doctor: [facility] at [phone]    Special Instructions:    No driving for 24 hours. Nothing to eat/drink until 12:45. Start with small sips of something cool. If tolerated you may progress to solid foods. If you start to cough/choke, STOP eating drinking wait 30 minutes and then try again. Nothing hot, like soup or coffee until after 3:00 this afternoon. Patient Education        Electrical Cardioversion: What to Expect at Home  Your Recovery     Electrical cardioversion is a treatment for an abnormal heartbeat, such as atrial fibrillation, supraventricular tachycardia, or ventricular tachycardia (VT). Your doctor used a brief electrical shock to reset your heart's rhythm. After the procedure, you may have redness, like a sunburn, where the patches were. The medicines you got to make you sleepy may make you feel drowsy for the rest of the day. Your doctor may have you take medicines to help the heart beat normally and to prevent blood clots. This care sheet gives you a general idea about how long it will take for you to recover. But each person recovers at a different pace. Follow the steps below to feel better as quickly as possible. How can you care for yourself at home? Medicines    · Be safe with medicines.  Take your medicines exactly as prescribed. Call your doctor if you think you are having a problem with your medicine. You may take one or more of the following medicines:  ? Rate-control medicines to slow the heart rate. These include beta-blockers, calcium channel blockers, and digoxin. ? Rhythm control medicines that help the heart keep a normal rhythm. ? Blood thinners, also called anticoagulants, which help prevent blood clots. You will get more details on the specific medicines your doctor prescribes. Be sure you know how to take your medicines safely.     · Do not take any vitamins, over-the-counter medicines, or herbal products without talking to your doctor first.   Exercise    · Start light exercise if your doctor says that it's okay. Even a small amount will help you get stronger, have more energy, and manage your stress. Walking is an easy way to get exercise. Start out by walking a little more than you did in the hospital. Bit by bit, increase the amount you walk.     · When you exercise, watch for signs that your heart is working too hard. You are pushing too hard if you cannot talk while you are exercising. If you become short of breath or dizzy or have chest pain, sit down and rest right away.     · Check your pulse regularly. Place two fingers on the artery at the palm side of your wrist in line with your thumb. If your heartbeat seems uneven or fast, talk to your doctor. Other instructions    · Ask your doctor when you can drive again.     · Do not smoke. If you need help quitting, talk to your doctor about stop-smoking programs and medicines. These can increase your chances of quitting for good.     · Limit alcohol. Follow-up care is a key part of your treatment and safety. Be sure to make and go to all appointments, and call your doctor if you are having problems. It's also a good idea to know your test results and keep a list of the medicines you take. When should you call for help?    Call [phone] anytime you think you may need emergency care. For example, call if:    · You passed out (lost consciousness).     · You have chest pain or pressure. This may occur with:  ? Sweating. ? Shortness of breath. ? Nausea or vomiting. ? Pain that spreads from the chest to the neck, jaw, or one or both shoulders or arms. ? A fast or uneven pulse. After calling 911, the  may tell you to chew 1 adult-strength or 2 to 4 low-dose aspirin. Wait for an ambulance. Do not try to drive yourself.     · You have symptoms of a stroke. These may include:  ? Sudden numbness, tingling, weakness, or loss of movement in your face, arm, or leg, especially on only one side of your body. ? Sudden vision changes. ? Sudden trouble speaking. ? Sudden confusion or trouble understanding simple statements. ? Sudden problems with walking or balance. ? A sudden, severe headache that is different from past headaches. Call your doctor now or seek immediate medical care if:    · You feel dizzy or lightheaded, or you feel like you may faint.     · You have a fast or irregular heartbeat. Watch closely for any changes in your health, and be sure to contact your doctor if you have any problems. Where can you learn more? Go to http://www.gray.com/  Enter A617 in the search box to learn more about \"Electrical Cardioversion: What to Expect at Home. \"  Current as of: April 29, 2021               Content Version: 13.0  © 3809-9044 [employer]. Care instructions adapted under license by [employer] (which disclaims liability or warranty for this information). If you have questions about a medical condition or this instruction, always ask your healthcare professional. [place] [address] any warranty or liability for your use of this information.

## 2021-12-10 PROCEDURE — 92960 CARDIOVERSION ELECTRIC EXT: CPT | Performed by: INTERNAL MEDICINE

## 2021-12-10 PROCEDURE — 99152 MOD SED SAME PHYS/QHP 5/>YRS: CPT | Performed by: INTERNAL MEDICINE

## 2021-12-10 PROCEDURE — 93312 ECHO TRANSESOPHAGEAL: CPT | Performed by: INTERNAL MEDICINE

## 2021-12-10 PROCEDURE — 93320 DOPPLER ECHO COMPLETE: CPT | Performed by: INTERNAL MEDICINE

## 2021-12-10 PROCEDURE — 93325 DOPPLER ECHO COLOR FLOW MAPG: CPT | Performed by: INTERNAL MEDICINE

## 2021-12-15 PROBLEM — I48.19 PERSISTENT ATRIAL FIBRILLATION (HCC): Status: ACTIVE | Noted: 2021-12-15

## 2022-02-07 ENCOUNTER — HOSPITAL ENCOUNTER (OUTPATIENT)
Dept: LAB | Age: 72
Discharge: HOME | End: 2022-02-07
Payer: MEDICARE

## 2022-02-07 DIAGNOSIS — Z11.59 NEED FOR HEPATITIS C SCREENING TEST: ICD-10-CM

## 2022-02-07 DIAGNOSIS — E78.00 PURE HYPERCHOLESTEROLEMIA: ICD-10-CM

## 2022-02-07 DIAGNOSIS — I10 ESSENTIAL HYPERTENSION: ICD-10-CM

## 2022-02-07 DIAGNOSIS — I48.19 PERSISTENT ATRIAL FIBRILLATION (HCC): ICD-10-CM

## 2022-02-07 DIAGNOSIS — I48.19 ATRIAL FIBRILLATION, PERSISTENT (HCC): ICD-10-CM

## 2022-02-07 DIAGNOSIS — I48.0 PAROXYSMAL ATRIAL FIBRILLATION (HCC): ICD-10-CM

## 2022-02-07 LAB
ALBUMIN SERPL-MCNC: 3.7 G/DL (ref 3.2–4.6)
ALBUMIN/GLOB SERPL: 1.2 {RATIO} (ref 1.2–3.5)
ALP SERPL-CCNC: 68 U/L (ref 50–136)
ALT SERPL-CCNC: 43 U/L (ref 12–65)
ANION GAP SERPL CALC-SCNC: 4 MMOL/L (ref 7–16)
ANION GAP SERPL CALC-SCNC: 6 MMOL/L (ref 7–16)
AST SERPL-CCNC: 23 U/L (ref 15–37)
BASOPHILS # BLD: 0 K/UL (ref 0–0.2)
BASOPHILS # BLD: 0 K/UL (ref 0–0.2)
BASOPHILS NFR BLD: 1 % (ref 0–2)
BASOPHILS NFR BLD: 1 % (ref 0–2)
BILIRUB SERPL-MCNC: 0.5 MG/DL (ref 0.2–1.1)
BUN SERPL-MCNC: 24 MG/DL (ref 8–23)
BUN SERPL-MCNC: 25 MG/DL (ref 8–23)
CALCIUM SERPL-MCNC: 8.8 MG/DL (ref 8.3–10.4)
CALCIUM SERPL-MCNC: 9 MG/DL (ref 8.3–10.4)
CHLORIDE SERPL-SCNC: 108 MMOL/L (ref 98–107)
CHLORIDE SERPL-SCNC: 109 MMOL/L (ref 98–107)
CHOLEST SERPL-MCNC: 108 MG/DL
CO2 SERPL-SCNC: 25 MMOL/L (ref 21–32)
CO2 SERPL-SCNC: 25 MMOL/L (ref 21–32)
CREAT SERPL-MCNC: 1.3 MG/DL (ref 0.8–1.5)
CREAT SERPL-MCNC: 1.3 MG/DL (ref 0.8–1.5)
DIFFERENTIAL METHOD BLD: ABNORMAL
DIFFERENTIAL METHOD BLD: ABNORMAL
EOSINOPHIL # BLD: 0.1 K/UL (ref 0–0.8)
EOSINOPHIL # BLD: 0.1 K/UL (ref 0–0.8)
EOSINOPHIL NFR BLD: 1 % (ref 0.5–7.8)
EOSINOPHIL NFR BLD: 1 % (ref 0.5–7.8)
ERYTHROCYTE [DISTWIDTH] IN BLOOD BY AUTOMATED COUNT: 13.2 % (ref 11.9–14.6)
ERYTHROCYTE [DISTWIDTH] IN BLOOD BY AUTOMATED COUNT: 13.2 % (ref 11.9–14.6)
GLOBULIN SER CALC-MCNC: 3.1 G/DL (ref 2.3–3.5)
GLUCOSE SERPL-MCNC: 108 MG/DL (ref 65–100)
GLUCOSE SERPL-MCNC: 109 MG/DL (ref 65–100)
HCT VFR BLD AUTO: 34.1 % (ref 41.1–50.3)
HCT VFR BLD AUTO: 34.4 % (ref 41.1–50.3)
HCV AB SER QL: NONREACTIVE
HDLC SERPL-MCNC: 50 MG/DL (ref 40–60)
HDLC SERPL: 2.2 {RATIO}
HGB BLD-MCNC: 11.1 G/DL (ref 13.6–17.2)
HGB BLD-MCNC: 11.1 G/DL (ref 13.6–17.2)
IMM GRANULOCYTES # BLD AUTO: 0 K/UL (ref 0–0.5)
IMM GRANULOCYTES # BLD AUTO: 0 K/UL (ref 0–0.5)
IMM GRANULOCYTES NFR BLD AUTO: 0 % (ref 0–5)
IMM GRANULOCYTES NFR BLD AUTO: 0 % (ref 0–5)
INR PPP: 2.1
LDLC SERPL CALC-MCNC: 48 MG/DL
LYMPHOCYTES # BLD: 1.1 K/UL (ref 0.5–4.6)
LYMPHOCYTES # BLD: 1.2 K/UL (ref 0.5–4.6)
LYMPHOCYTES NFR BLD: 23 % (ref 13–44)
LYMPHOCYTES NFR BLD: 24 % (ref 13–44)
MCH RBC QN AUTO: 31 PG (ref 26.1–32.9)
MCH RBC QN AUTO: 31.4 PG (ref 26.1–32.9)
MCHC RBC AUTO-ENTMCNC: 32.3 G/DL (ref 31.4–35)
MCHC RBC AUTO-ENTMCNC: 32.6 G/DL (ref 31.4–35)
MCV RBC AUTO: 96.1 FL (ref 79.6–97.8)
MCV RBC AUTO: 96.6 FL (ref 79.6–97.8)
MONOCYTES # BLD: 0.4 K/UL (ref 0.1–1.3)
MONOCYTES # BLD: 0.4 K/UL (ref 0.1–1.3)
MONOCYTES NFR BLD: 7 % (ref 4–12)
MONOCYTES NFR BLD: 7 % (ref 4–12)
NEUTS SEG # BLD: 3.2 K/UL (ref 1.7–8.2)
NEUTS SEG # BLD: 3.2 K/UL (ref 1.7–8.2)
NEUTS SEG NFR BLD: 66 % (ref 43–78)
NEUTS SEG NFR BLD: 68 % (ref 43–78)
NRBC # BLD: 0 K/UL (ref 0–0.2)
NRBC # BLD: 0 K/UL (ref 0–0.2)
PLATELET # BLD AUTO: 164 K/UL (ref 150–450)
PLATELET # BLD AUTO: 170 K/UL (ref 150–450)
PMV BLD AUTO: 12 FL (ref 9.4–12.3)
PMV BLD AUTO: 12.1 FL (ref 9.4–12.3)
POTASSIUM SERPL-SCNC: 4.2 MMOL/L (ref 3.5–5.1)
POTASSIUM SERPL-SCNC: 4.3 MMOL/L (ref 3.5–5.1)
PROT SERPL-MCNC: 6.8 G/DL (ref 6.3–8.2)
PROTHROMBIN TIME: 23.9 SEC (ref 12.6–14.5)
RBC # BLD AUTO: 3.53 M/UL (ref 4.23–5.6)
RBC # BLD AUTO: 3.58 M/UL (ref 4.23–5.6)
SODIUM SERPL-SCNC: 138 MMOL/L (ref 138–145)
SODIUM SERPL-SCNC: 139 MMOL/L (ref 136–145)
TRIGL SERPL-MCNC: 50 MG/DL (ref 35–150)
VLDLC SERPL CALC-MCNC: 10 MG/DL (ref 6–23)
WBC # BLD AUTO: 4.8 K/UL (ref 4.3–11.1)
WBC # BLD AUTO: 4.8 K/UL (ref 4.3–11.1)

## 2022-02-07 PROCEDURE — 80061 LIPID PANEL: CPT

## 2022-02-07 PROCEDURE — 86803 HEPATITIS C AB TEST: CPT

## 2022-02-07 PROCEDURE — 80053 COMPREHEN METABOLIC PANEL: CPT

## 2022-02-07 PROCEDURE — 83735 ASSAY OF MAGNESIUM: CPT

## 2022-02-07 PROCEDURE — 36415 COLL VENOUS BLD VENIPUNCTURE: CPT

## 2022-02-07 PROCEDURE — 85025 COMPLETE CBC W/AUTO DIFF WBC: CPT

## 2022-02-07 PROCEDURE — 85610 PROTHROMBIN TIME: CPT

## 2022-02-08 NOTE — PROGRESS NOTES
Pre-procedure call completed. Instructed to arrive at 0730 for CVN. Instructed to take all am prescribed medication while HOLDING all vitamins, supplements and spirolactone. Instructed to remain NPO after MN.

## 2022-02-09 ENCOUNTER — HOSPITAL ENCOUNTER (OUTPATIENT)
Dept: CARDIAC CATH/INVASIVE PROCEDURES | Age: 72
Discharge: HOME | End: 2022-02-09
Attending: INTERNAL MEDICINE | Admitting: INTERNAL MEDICINE
Payer: MEDICARE

## 2022-02-09 ENCOUNTER — ANESTHESIA (OUTPATIENT)
Dept: CARDIAC CATH/INVASIVE PROCEDURES | Age: 72
End: 2022-02-09
Payer: MEDICARE

## 2022-02-09 ENCOUNTER — ANESTHESIA EVENT (OUTPATIENT)
Dept: CARDIAC CATH/INVASIVE PROCEDURES | Age: 72
End: 2022-02-09
Payer: MEDICARE

## 2022-02-09 VITALS
BODY MASS INDEX: 32.06 KG/M2 | TEMPERATURE: 97.6 F | RESPIRATION RATE: 12 BRPM | SYSTOLIC BLOOD PRESSURE: 113 MMHG | OXYGEN SATURATION: 98 % | DIASTOLIC BLOOD PRESSURE: 62 MMHG | HEART RATE: 65 BPM | WEIGHT: 229 LBS | HEIGHT: 71 IN

## 2022-02-09 DIAGNOSIS — I48.19 ATRIAL FIBRILLATION, PERSISTENT (HCC): ICD-10-CM

## 2022-02-09 DIAGNOSIS — I48.91 ATRIAL FIBRILLATION, UNSPECIFIED TYPE (HCC): ICD-10-CM

## 2022-02-09 LAB
ATRIAL RATE: 61 BPM
ATRIAL RATE: 70 BPM
CALCULATED R AXIS, ECG10: 18 DEGREES
CALCULATED R AXIS, ECG10: 20 DEGREES
CALCULATED T AXIS, ECG11: 32 DEGREES
CALCULATED T AXIS, ECG11: 74 DEGREES
DIAGNOSIS, 93000: NORMAL
DIAGNOSIS, 93000: NORMAL
MAGNESIUM SERPL-MCNC: 2.4 MG/DL (ref 1.6–2.3)
Q-T INTERVAL, ECG07: 444 MS
Q-T INTERVAL, ECG07: 480 MS
QRS DURATION, ECG06: 100 MS
QRS DURATION, ECG06: 102 MS
QTC CALCULATION (BEZET), ECG08: 446 MS
QTC CALCULATION (BEZET), ECG08: 487 MS
VENTRICULAR RATE, ECG03: 61 BPM
VENTRICULAR RATE, ECG03: 62 BPM

## 2022-02-09 PROCEDURE — 92960 CARDIOVERSION ELECTRIC EXT: CPT | Performed by: INTERNAL MEDICINE

## 2022-02-09 PROCEDURE — 93005 ELECTROCARDIOGRAM TRACING: CPT | Performed by: INTERNAL MEDICINE

## 2022-02-09 PROCEDURE — 74011250636 HC RX REV CODE- 250/636: Performed by: NURSE ANESTHETIST, CERTIFIED REGISTERED

## 2022-02-09 PROCEDURE — 76060000031 HC ANESTHESIA FIRST 0.5 HR: Performed by: INTERNAL MEDICINE

## 2022-02-09 PROCEDURE — 74011000250 HC RX REV CODE- 250: Performed by: NURSE ANESTHETIST, CERTIFIED REGISTERED

## 2022-02-09 RX ORDER — MIDAZOLAM HYDROCHLORIDE 1 MG/ML
.5-2 INJECTION, SOLUTION INTRAMUSCULAR; INTRAVENOUS
Status: DISCONTINUED | OUTPATIENT
Start: 2022-02-09 | End: 2022-02-09

## 2022-02-09 RX ORDER — LIDOCAINE HYDROCHLORIDE 20 MG/ML
INJECTION, SOLUTION EPIDURAL; INFILTRATION; INTRACAUDAL; PERINEURAL AS NEEDED
Status: DISCONTINUED | OUTPATIENT
Start: 2022-02-09 | End: 2022-02-09 | Stop reason: HOSPADM

## 2022-02-09 RX ORDER — FENTANYL CITRATE 50 UG/ML
25-50 INJECTION, SOLUTION INTRAMUSCULAR; INTRAVENOUS
Status: DISCONTINUED | OUTPATIENT
Start: 2022-02-09 | End: 2022-02-09

## 2022-02-09 RX ORDER — SODIUM CHLORIDE, SODIUM LACTATE, POTASSIUM CHLORIDE, CALCIUM CHLORIDE 600; 310; 30; 20 MG/100ML; MG/100ML; MG/100ML; MG/100ML
INJECTION, SOLUTION INTRAVENOUS
Status: DISCONTINUED | OUTPATIENT
Start: 2022-02-09 | End: 2022-02-09 | Stop reason: HOSPADM

## 2022-02-09 RX ORDER — PROPOFOL 10 MG/ML
INJECTION, EMULSION INTRAVENOUS AS NEEDED
Status: DISCONTINUED | OUTPATIENT
Start: 2022-02-09 | End: 2022-02-09 | Stop reason: HOSPADM

## 2022-02-09 RX ORDER — SODIUM CHLORIDE 9 MG/ML
75 INJECTION, SOLUTION INTRAVENOUS CONTINUOUS
Status: DISCONTINUED | OUTPATIENT
Start: 2022-02-09 | End: 2022-02-09 | Stop reason: HOSPADM

## 2022-02-09 RX ADMIN — LIDOCAINE HYDROCHLORIDE 40 MG: 20 INJECTION, SOLUTION EPIDURAL; INFILTRATION; INTRACAUDAL; PERINEURAL at 09:01

## 2022-02-09 RX ADMIN — PROPOFOL 50 MG: 10 INJECTION, EMULSION INTRAVENOUS at 09:01

## 2022-02-09 RX ADMIN — SODIUM CHLORIDE, SODIUM LACTATE, POTASSIUM CHLORIDE, AND CALCIUM CHLORIDE: 600; 310; 30; 20 INJECTION, SOLUTION INTRAVENOUS at 08:56

## 2022-02-09 RX ADMIN — PROPOFOL 40 MG: 10 INJECTION, EMULSION INTRAVENOUS at 09:02

## 2022-02-09 NOTE — ANESTHESIA POSTPROCEDURE EVALUATION
Procedure(s):  EP CARDIOVERSION. total IV anesthesia    Anesthesia Post Evaluation      Multimodal analgesia: multimodal analgesia used between 6 hours prior to anesthesia start to PACU discharge  Patient location during evaluation: bedside  Patient participation: complete - patient participated  Level of consciousness: awake  Pain management: adequate  Airway patency: patent  Anesthetic complications: no  Cardiovascular status: acceptable and stable  Respiratory status: acceptable and room air  Hydration status: acceptable  Post anesthesia nausea and vomiting:  none  Final Post Anesthesia Temperature Assessment:  Normothermia (36.0-37.5 degrees C)      INITIAL Post-op Vital signs:   Vitals Value Taken Time   /66 02/09/22 0919   Temp 36.4 °C (97.6 °F) 02/09/22 0913   Pulse 59 02/09/22 0920   Resp 12 02/09/22 0915   SpO2 96 % 02/09/22 0920   Vitals shown include unvalidated device data.

## 2022-02-09 NOTE — DISCHARGE INSTRUCTIONS
Patient Education   Do not drive for the next 24 hours     Electrical Cardioversion: What to Expect at [facility]     Electrical cardioversion is a treatment for an abnormal heartbeat, such as atrial fibrillation, supraventricular tachycardia, or ventricular tachycardia (VT). Your doctor used a brief electrical shock to reset your heart's rhythm. After the procedure, you may have redness, like a sunburn, where the patches were. The medicines you got to make you sleepy may make you feel drowsy for the rest of the day. Your doctor may have you take medicines to help the heart beat normally and to prevent blood clots. This care sheet gives you a general idea about how long it will take for you to recover. But each person recovers at a different pace. Follow the steps below to feel better as quickly as possible. How can you care for yourself at home? Medicines    · Be safe with medicines. Take your medicines exactly as prescribed. Call your doctor if you think you are having a problem with your medicine. You may take one or more of the following medicines:  ? Rate-control medicines to slow the heart rate. These include beta-blockers, calcium channel blockers, and digoxin. ? Rhythm control medicines that help the heart keep a normal rhythm. ? Blood thinners, also called anticoagulants, which help prevent blood clots. You will get more details on the specific medicines your doctor prescribes. Be sure you know how to take your medicines safely.     · Do not take any vitamins, over-the-counter medicines, or herbal products without talking to your doctor first.   Exercise    · Start light exercise if your doctor says that it's okay. Even a small amount will help you get stronger, have more energy, and manage your stress. Walking is an easy way to get exercise.  Start out by walking a little more than you did in the hospital. Bit by bit, increase the amount you walk.     · When you exercise, watch for signs that your heart is working too hard. You are pushing too hard if you cannot talk while you are exercising. If you become short of breath or dizzy or have chest pain, sit down and rest right away.     · Check your pulse regularly. Place two fingers on the artery at the palm side of your wrist in line with your thumb. If your heartbeat seems uneven or fast, talk to your doctor. Other instructions    · Ask your doctor when you can drive again.     · Do not smoke. If you need help quitting, talk to your doctor about stop-smoking programs and medicines. These can increase your chances of quitting for good.     · Limit alcohol. Follow-up care is a key part of your treatment and safety. Be sure to make and go to all appointments, and call your doctor if you are having problems. It's also a good idea to know your test results and keep a list of the medicines you take. When should you call for help? Call 911 anytime you think you may need emergency care. For example, call if:    · You passed out (lost consciousness).     · You have chest pain or pressure. This may occur with:  ? Sweating. ? Shortness of breath. ? Nausea or vomiting. ? Pain that spreads from the chest to the neck, jaw, or one or both shoulders or arms. ? A fast or uneven pulse. After calling 911, the  may tell you to chew 1 adult-strength or 2 to 4 low-dose aspirin. Wait for an ambulance. Do not try to drive yourself.     · You have symptoms of a stroke. These may include:  ? Sudden numbness, tingling, weakness, or loss of movement in your face, arm, or leg, especially on only one side of your body. ? Sudden vision changes. ? Sudden trouble speaking. ? Sudden confusion or trouble understanding simple statements. ? Sudden problems with walking or balance. ? A sudden, severe headache that is different from past headaches.    Call your doctor now or seek immediate medical care if:    · You feel dizzy or lightheaded, or you feel like you may faint.     · You have a fast or irregular heartbeat. Watch closely for any changes in your health, and be sure to contact your doctor if you have any problems. Where can you learn more? Go to http://www.gray.com/  Enter A617 in the search box to learn more about \"Electrical Cardioversion: What to Expect at Home. \"  Current as of: April 29, 2021               Content Version: 13.0  © 2006-2021 [employer]. Care instructions adapted under license by [employer] (which disclaims liability or warranty for this information). If you have questions about a medical condition or this instruction, always ask your healthcare professional. [place] [address] any warranty or liability for your use of this information.

## 2022-02-09 NOTE — PROGRESS NOTES
Patient received to [address] [place] [address] room # 17  Ambulatory from [facility]. Patient scheduled for CVN today with Dr Blue Self. Procedure reviewed & questions answered, voiced good understanding consent obtained & placed on chart. All medications and medical history reviewed. Will prep patient per orders. Patient & family updated on plan of care.       The patient has a fraility score of 3-MANAGING WELL, based on ability to perform ADLS by self

## 2022-02-09 NOTE — PROGRESS NOTES
TRANSFER - IN REPORT:    Verbal report received from [facility] RN(name) on Giullermo Cedeno  being received from EP lab(unit) for routine progression of care      Report consisted of patients Situation, Background, Assessment and   Recommendations(SBAR). Information from the following report(s) Procedure Summary was reviewed with the receiving nurse. Opportunity for questions and clarification was provided. Assessment completed upon patients arrival to unit and care assumed.

## 2022-03-16 PROBLEM — K92.1 HEMATOCHEZIA: Status: ACTIVE | Noted: 2022-03-16

## 2022-03-18 PROBLEM — R07.2 PRECORDIAL PAIN: Status: ACTIVE | Noted: 2021-01-14

## 2022-03-18 PROBLEM — E66.9 OBESITY: Status: ACTIVE | Noted: 2017-05-22

## 2022-03-19 PROBLEM — Z23 ENCOUNTER FOR IMMUNIZATION: Status: ACTIVE | Noted: 2021-07-27

## 2022-03-19 PROBLEM — I71.20 THORACIC AORTIC ANEURYSM WITHOUT RUPTURE (HCC): Status: ACTIVE | Noted: 2021-02-26

## 2022-03-19 PROBLEM — I49.3 PVC (PREMATURE VENTRICULAR CONTRACTION): Status: ACTIVE | Noted: 2021-02-26

## 2022-03-19 PROBLEM — I48.19 PERSISTENT ATRIAL FIBRILLATION (HCC): Status: ACTIVE | Noted: 2021-12-15

## 2022-03-19 PROBLEM — R97.20 ELEVATED PSA: Status: ACTIVE | Noted: 2017-05-22

## 2022-03-19 PROBLEM — Z11.59 NEED FOR HEPATITIS C SCREENING TEST: Status: ACTIVE | Noted: 2021-07-27

## 2022-03-20 PROBLEM — K63.5 COLON POLYPS: Status: ACTIVE | Noted: 2021-07-27

## 2022-03-20 PROBLEM — Z00.00 MEDICARE ANNUAL WELLNESS VISIT, SUBSEQUENT: Status: ACTIVE | Noted: 2021-07-27

## 2022-03-24 PROBLEM — K92.1 HEMATOCHEZIA: Status: ACTIVE | Noted: 2022-03-16

## 2022-03-25 ENCOUNTER — HOSPITAL ENCOUNTER (OUTPATIENT)
Dept: LAB | Age: 72
Discharge: HOME | End: 2022-03-25
Payer: MEDICARE

## 2022-03-25 DIAGNOSIS — I48.0 PAF (PAROXYSMAL ATRIAL FIBRILLATION) (HCC): ICD-10-CM

## 2022-03-25 LAB
ANION GAP SERPL CALC-SCNC: 3 MMOL/L (ref 7–16)
BASOPHILS # BLD: 0 K/UL (ref 0–0.2)
BASOPHILS NFR BLD: 1 % (ref 0–2)
BUN SERPL-MCNC: 29 MG/DL (ref 8–23)
CALCIUM SERPL-MCNC: 9.1 MG/DL (ref 8.3–10.4)
CHLORIDE SERPL-SCNC: 109 MMOL/L (ref 98–107)
CO2 SERPL-SCNC: 27 MMOL/L (ref 21–32)
CREAT SERPL-MCNC: 1.4 MG/DL (ref 0.8–1.5)
DIFFERENTIAL METHOD BLD: ABNORMAL
EOSINOPHIL # BLD: 0.1 K/UL (ref 0–0.8)
EOSINOPHIL NFR BLD: 2 % (ref 0.5–7.8)
ERYTHROCYTE [DISTWIDTH] IN BLOOD BY AUTOMATED COUNT: 13.3 % (ref 11.9–14.6)
GLUCOSE SERPL-MCNC: 109 MG/DL (ref 65–100)
HCT VFR BLD AUTO: 39.4 % (ref 41.1–50.3)
HGB BLD-MCNC: 12.5 G/DL (ref 13.6–17.2)
IMM GRANULOCYTES # BLD AUTO: 0 K/UL (ref 0–0.5)
IMM GRANULOCYTES NFR BLD AUTO: 0 % (ref 0–5)
INR PPP: 2.2
LYMPHOCYTES # BLD: 1.6 K/UL (ref 0.5–4.6)
LYMPHOCYTES NFR BLD: 28 % (ref 13–44)
MAGNESIUM SERPL-MCNC: 2.2 MG/DL (ref 1.8–2.4)
MCH RBC QN AUTO: 30.9 PG (ref 26.1–32.9)
MCHC RBC AUTO-ENTMCNC: 31.7 G/DL (ref 31.4–35)
MCV RBC AUTO: 97.3 FL (ref 79.6–97.8)
MONOCYTES # BLD: 0.5 K/UL (ref 0.1–1.3)
MONOCYTES NFR BLD: 9 % (ref 4–12)
NEUTS SEG # BLD: 3.5 K/UL (ref 1.7–8.2)
NEUTS SEG NFR BLD: 61 % (ref 43–78)
NRBC # BLD: 0 K/UL (ref 0–0.2)
PLATELET # BLD AUTO: 147 K/UL (ref 150–450)
PMV BLD AUTO: 12.7 FL (ref 9.4–12.3)
POTASSIUM SERPL-SCNC: 4.6 MMOL/L (ref 3.5–5.1)
PROTHROMBIN TIME: 24.6 SEC (ref 12.6–14.5)
RBC # BLD AUTO: 4.05 M/UL (ref 4.23–5.6)
SODIUM SERPL-SCNC: 139 MMOL/L (ref 138–145)
WBC # BLD AUTO: 5.8 K/UL (ref 4.3–11.1)

## 2022-03-25 PROCEDURE — 80048 BASIC METABOLIC PNL TOTAL CA: CPT

## 2022-03-25 PROCEDURE — 85025 COMPLETE CBC W/AUTO DIFF WBC: CPT

## 2022-03-25 PROCEDURE — 85610 PROTHROMBIN TIME: CPT

## 2022-03-25 PROCEDURE — 36415 COLL VENOUS BLD VENIPUNCTURE: CPT

## 2022-03-25 PROCEDURE — 83735 ASSAY OF MAGNESIUM: CPT

## 2022-03-28 ENCOUNTER — ANESTHESIA EVENT (OUTPATIENT)
Dept: CARDIAC CATH/INVASIVE PROCEDURES | Age: 72
End: 2022-03-28
Payer: MEDICARE

## 2022-03-28 RX ORDER — SODIUM CHLORIDE 0.9 % (FLUSH) 0.9 %
5-40 SYRINGE (ML) INJECTION AS NEEDED
Status: CANCELLED | OUTPATIENT
Start: 2022-03-28

## 2022-03-28 RX ORDER — LIDOCAINE HYDROCHLORIDE 10 MG/ML
0.1 INJECTION INFILTRATION; PERINEURAL AS NEEDED
Status: CANCELLED | OUTPATIENT
Start: 2022-03-28

## 2022-03-28 RX ORDER — SODIUM CHLORIDE, SODIUM LACTATE, POTASSIUM CHLORIDE, CALCIUM CHLORIDE 600; 310; 30; 20 MG/100ML; MG/100ML; MG/100ML; MG/100ML
100 INJECTION, SOLUTION INTRAVENOUS CONTINUOUS
Status: CANCELLED | OUTPATIENT
Start: 2022-03-28 | End: 2022-03-29

## 2022-03-28 RX ORDER — MIDAZOLAM HYDROCHLORIDE 1 MG/ML
2 INJECTION, SOLUTION INTRAMUSCULAR; INTRAVENOUS
Status: CANCELLED | OUTPATIENT
Start: 2022-03-28 | End: 2022-03-29

## 2022-03-28 RX ORDER — SODIUM CHLORIDE 0.9 % (FLUSH) 0.9 %
5-40 SYRINGE (ML) INJECTION EVERY 8 HOURS
Status: CANCELLED | OUTPATIENT
Start: 2022-03-28

## 2022-03-28 NOTE — PROGRESS NOTES
Patient pre-assessment complete for JOHN- Atrial fib ablation with Dr Ga Richter scheduled for 3/29/22 at 9:30am, arrival time 7:30am. Patient verified using . Patient instructed to bring a list of all home medications on the day of procedure. NPO status reinforced. Patient instructed to HOLD xarelto & lisinopril tonight & HOLD lisinopril & spironolactone in am . Instructed they can take all other medications excluding vitamins & supplements. Patient verbalizes understanding of all instructions & denies any questions at this time.

## 2022-03-29 ENCOUNTER — ANESTHESIA (OUTPATIENT)
Dept: CARDIAC CATH/INVASIVE PROCEDURES | Age: 72
End: 2022-03-29
Payer: MEDICARE

## 2022-03-29 ENCOUNTER — HOSPITAL ENCOUNTER (OUTPATIENT)
Age: 72
Setting detail: OUTPATIENT SURGERY
Discharge: HOME | End: 2022-03-29
Attending: INTERNAL MEDICINE | Admitting: INTERNAL MEDICINE
Payer: MEDICARE

## 2022-03-29 ENCOUNTER — APPOINTMENT (OUTPATIENT)
Dept: CARDIAC CATH/INVASIVE PROCEDURES | Age: 72
End: 2022-03-29
Attending: INTERNAL MEDICINE
Payer: MEDICARE

## 2022-03-29 VITALS
HEART RATE: 58 BPM | SYSTOLIC BLOOD PRESSURE: 111 MMHG | DIASTOLIC BLOOD PRESSURE: 70 MMHG | HEIGHT: 71 IN | RESPIRATION RATE: 14 BRPM | TEMPERATURE: 97.4 F | OXYGEN SATURATION: 97 % | WEIGHT: 232 LBS | BODY MASS INDEX: 32.48 KG/M2

## 2022-03-29 DIAGNOSIS — I48.19 PERSISTENT ATRIAL FIBRILLATION (HCC): ICD-10-CM

## 2022-03-29 DIAGNOSIS — I48.0 PAF (PAROXYSMAL ATRIAL FIBRILLATION) (HCC): ICD-10-CM

## 2022-03-29 DIAGNOSIS — I48.91 ATRIAL FIBRILLATION, UNSPECIFIED TYPE (HCC): ICD-10-CM

## 2022-03-29 LAB
ACT BLD: 285 SECS (ref 70–128)
ACT BLD: 303 SECS (ref 70–128)
ATRIAL RATE: 55 BPM
CALCULATED P AXIS, ECG09: 16 DEGREES
CALCULATED R AXIS, ECG10: 1 DEGREES
CALCULATED T AXIS, ECG11: 47 DEGREES
DIAGNOSIS, 93000: NORMAL
P-R INTERVAL, ECG05: 168 MS
Q-T INTERVAL, ECG07: 484 MS
QRS DURATION, ECG06: 108 MS
QTC CALCULATION (BEZET), ECG08: 463 MS
VENTRICULAR RATE, ECG03: 55 BPM

## 2022-03-29 PROCEDURE — 93312 ECHO TRANSESOPHAGEAL: CPT

## 2022-03-29 PROCEDURE — 93325 DOPPLER ECHO COLOR FLOW MAPG: CPT | Performed by: INTERNAL MEDICINE

## 2022-03-29 PROCEDURE — 77030039425 HC BLD LARYNG TRULITE DISP TELE -A: Performed by: STUDENT IN AN ORGANIZED HEALTH CARE EDUCATION/TRAINING PROGRAM

## 2022-03-29 PROCEDURE — 77030035291 HC TBNG PMP SMARTABLATE J&J -B: Performed by: INTERNAL MEDICINE

## 2022-03-29 PROCEDURE — 93459 L HRT ART/GRFT ANGIO: CPT | Performed by: INTERNAL MEDICINE

## 2022-03-29 PROCEDURE — 76210000006 HC OR PH I REC 0.5 TO 1 HR: Performed by: INTERNAL MEDICINE

## 2022-03-29 PROCEDURE — C1732 CATH, EP, DIAG/ABL, 3D/VECT: HCPCS | Performed by: INTERNAL MEDICINE

## 2022-03-29 PROCEDURE — 93656 COMPRE EP EVAL ABLTJ ATR FIB: CPT | Performed by: INTERNAL MEDICINE

## 2022-03-29 PROCEDURE — 77030020506 HC NDL TRNSPTL NRG BAYL -F: Performed by: INTERNAL MEDICINE

## 2022-03-29 PROCEDURE — 93657 TX L/R ATRIAL FIB ADDL: CPT | Performed by: INTERNAL MEDICINE

## 2022-03-29 PROCEDURE — 93005 ELECTROCARDIOGRAM TRACING: CPT | Performed by: INTERNAL MEDICINE

## 2022-03-29 PROCEDURE — C1730 CATH, EP, 19 OR FEW ELECT: HCPCS | Performed by: INTERNAL MEDICINE

## 2022-03-29 PROCEDURE — 93623 PRGRMD STIMJ&PACG IV RX NFS: CPT | Performed by: INTERNAL MEDICINE

## 2022-03-29 PROCEDURE — C1894 INTRO/SHEATH, NON-LASER: HCPCS | Performed by: INTERNAL MEDICINE

## 2022-03-29 PROCEDURE — 74011250636 HC RX REV CODE- 250/636: Performed by: INTERNAL MEDICINE

## 2022-03-29 PROCEDURE — 93655 ICAR CATH ABLTJ DSCRT ARRHYT: CPT | Performed by: INTERNAL MEDICINE

## 2022-03-29 PROCEDURE — 93622 COMP EP EVAL L VENTR PAC&REC: CPT | Performed by: INTERNAL MEDICINE

## 2022-03-29 PROCEDURE — 77030013794 HC KT TRNSDUC BLD EDWD -B: Performed by: INTERNAL MEDICINE

## 2022-03-29 PROCEDURE — 93662 INTRACARDIAC ECG (ICE): CPT | Performed by: INTERNAL MEDICINE

## 2022-03-29 PROCEDURE — 93613 INTRACARDIAC EPHYS 3D MAPG: CPT | Performed by: INTERNAL MEDICINE

## 2022-03-29 PROCEDURE — C1759 CATH, INTRA ECHOCARDIOGRAPHY: HCPCS | Performed by: INTERNAL MEDICINE

## 2022-03-29 PROCEDURE — 77030019908 HC STETH ESOPH SIMS -A: Performed by: STUDENT IN AN ORGANIZED HEALTH CARE EDUCATION/TRAINING PROGRAM

## 2022-03-29 PROCEDURE — 77030013687 HC GD NDL BARD -B: Performed by: INTERNAL MEDICINE

## 2022-03-29 PROCEDURE — C1893 INTRO/SHEATH, FIXED,NON-PEEL: HCPCS | Performed by: INTERNAL MEDICINE

## 2022-03-29 PROCEDURE — 93320 DOPPLER ECHO COMPLETE: CPT | Performed by: INTERNAL MEDICINE

## 2022-03-29 PROCEDURE — 76060000035 HC ANESTHESIA 2 TO 2.5 HR: Performed by: INTERNAL MEDICINE

## 2022-03-29 PROCEDURE — 74011250636 HC RX REV CODE- 250/636: Performed by: NURSE ANESTHETIST, CERTIFIED REGISTERED

## 2022-03-29 PROCEDURE — 77030037088 HC TUBE ENDOTRACH ORAL NSL COVD-A: Performed by: STUDENT IN AN ORGANIZED HEALTH CARE EDUCATION/TRAINING PROGRAM

## 2022-03-29 PROCEDURE — 85347 COAGULATION TIME ACTIVATED: CPT

## 2022-03-29 PROCEDURE — C1760 CLOSURE DEV, VASC: HCPCS | Performed by: INTERNAL MEDICINE

## 2022-03-29 PROCEDURE — 74011000250 HC RX REV CODE- 250: Performed by: NURSE ANESTHETIST, CERTIFIED REGISTERED

## 2022-03-29 PROCEDURE — 93312 ECHO TRANSESOPHAGEAL: CPT | Performed by: INTERNAL MEDICINE

## 2022-03-29 PROCEDURE — 77030027107 HC PTCH EXT REF CARTO3 J&J -F: Performed by: INTERNAL MEDICINE

## 2022-03-29 RX ORDER — FLUMAZENIL 0.1 MG/ML
0.2 INJECTION INTRAVENOUS
Status: DISCONTINUED | OUTPATIENT
Start: 2022-03-29 | End: 2022-03-29 | Stop reason: HOSPADM

## 2022-03-29 RX ORDER — ROCURONIUM BROMIDE 10 MG/ML
INJECTION, SOLUTION INTRAVENOUS AS NEEDED
Status: DISCONTINUED | OUTPATIENT
Start: 2022-03-29 | End: 2022-03-29 | Stop reason: HOSPADM

## 2022-03-29 RX ORDER — PROTAMINE SULFATE 10 MG/ML
INJECTION, SOLUTION INTRAVENOUS AS NEEDED
Status: DISCONTINUED | OUTPATIENT
Start: 2022-03-29 | End: 2022-03-29 | Stop reason: HOSPADM

## 2022-03-29 RX ORDER — DIPHENHYDRAMINE HYDROCHLORIDE 50 MG/ML
12.5 INJECTION, SOLUTION INTRAMUSCULAR; INTRAVENOUS
Status: DISCONTINUED | OUTPATIENT
Start: 2022-03-29 | End: 2022-03-29 | Stop reason: HOSPADM

## 2022-03-29 RX ORDER — EPHEDRINE SULFATE/0.9% NACL/PF 50 MG/5 ML
SYRINGE (ML) INTRAVENOUS AS NEEDED
Status: DISCONTINUED | OUTPATIENT
Start: 2022-03-29 | End: 2022-03-29 | Stop reason: HOSPADM

## 2022-03-29 RX ORDER — FENTANYL CITRATE 50 UG/ML
INJECTION, SOLUTION INTRAMUSCULAR; INTRAVENOUS AS NEEDED
Status: DISCONTINUED | OUTPATIENT
Start: 2022-03-29 | End: 2022-03-29 | Stop reason: HOSPADM

## 2022-03-29 RX ORDER — COLCHICINE 0.6 MG/1
0.6 TABLET ORAL DAILY
Qty: 30 TABLET | Refills: 0 | Status: SHIPPED | OUTPATIENT
Start: 2022-03-29 | End: 2022-04-29 | Stop reason: ALTCHOICE

## 2022-03-29 RX ORDER — ADENOSINE 3 MG/ML
INJECTION, SOLUTION INTRAVENOUS AS NEEDED
Status: DISCONTINUED | OUTPATIENT
Start: 2022-03-29 | End: 2022-03-29 | Stop reason: HOSPADM

## 2022-03-29 RX ORDER — SODIUM CHLORIDE, SODIUM LACTATE, POTASSIUM CHLORIDE, CALCIUM CHLORIDE 600; 310; 30; 20 MG/100ML; MG/100ML; MG/100ML; MG/100ML
INJECTION, SOLUTION INTRAVENOUS
Status: DISCONTINUED | OUTPATIENT
Start: 2022-03-29 | End: 2022-03-29 | Stop reason: HOSPADM

## 2022-03-29 RX ORDER — NALOXONE HYDROCHLORIDE 0.4 MG/ML
0.1 INJECTION, SOLUTION INTRAMUSCULAR; INTRAVENOUS; SUBCUTANEOUS AS NEEDED
Status: DISCONTINUED | OUTPATIENT
Start: 2022-03-29 | End: 2022-03-29 | Stop reason: HOSPADM

## 2022-03-29 RX ORDER — SODIUM CHLORIDE, SODIUM LACTATE, POTASSIUM CHLORIDE, CALCIUM CHLORIDE 600; 310; 30; 20 MG/100ML; MG/100ML; MG/100ML; MG/100ML
100 INJECTION, SOLUTION INTRAVENOUS CONTINUOUS
Status: DISCONTINUED | OUTPATIENT
Start: 2022-03-29 | End: 2022-03-29 | Stop reason: HOSPADM

## 2022-03-29 RX ORDER — PROPOFOL 10 MG/ML
INJECTION, EMULSION INTRAVENOUS AS NEEDED
Status: DISCONTINUED | OUTPATIENT
Start: 2022-03-29 | End: 2022-03-29 | Stop reason: HOSPADM

## 2022-03-29 RX ORDER — ONDANSETRON 2 MG/ML
INJECTION INTRAMUSCULAR; INTRAVENOUS AS NEEDED
Status: DISCONTINUED | OUTPATIENT
Start: 2022-03-29 | End: 2022-03-29 | Stop reason: HOSPADM

## 2022-03-29 RX ORDER — SUCRALFATE 1 G/1
1 TABLET ORAL 4 TIMES DAILY
Qty: 120 TABLET | Refills: 0 | Status: SHIPPED | OUTPATIENT
Start: 2022-03-29 | End: 2022-04-29 | Stop reason: ALTCHOICE

## 2022-03-29 RX ORDER — HEPARIN SODIUM 1000 [USP'U]/ML
INJECTION, SOLUTION INTRAVENOUS; SUBCUTANEOUS AS NEEDED
Status: DISCONTINUED | OUTPATIENT
Start: 2022-03-29 | End: 2022-03-29 | Stop reason: HOSPADM

## 2022-03-29 RX ORDER — HEPARIN SODIUM 5000 [USP'U]/100ML
INJECTION, SOLUTION INTRAVENOUS
Status: DISCONTINUED | OUTPATIENT
Start: 2022-03-29 | End: 2022-03-29 | Stop reason: HOSPADM

## 2022-03-29 RX ORDER — OXYCODONE HYDROCHLORIDE 5 MG/1
5 TABLET ORAL
Status: DISCONTINUED | OUTPATIENT
Start: 2022-03-29 | End: 2022-03-29 | Stop reason: HOSPADM

## 2022-03-29 RX ORDER — SODIUM CHLORIDE 0.9 % (FLUSH) 0.9 %
5-40 SYRINGE (ML) INJECTION AS NEEDED
Status: DISCONTINUED | OUTPATIENT
Start: 2022-03-29 | End: 2022-03-29 | Stop reason: HOSPADM

## 2022-03-29 RX ORDER — HYDROMORPHONE HYDROCHLORIDE 2 MG/ML
0.5 INJECTION, SOLUTION INTRAMUSCULAR; INTRAVENOUS; SUBCUTANEOUS
Status: DISCONTINUED | OUTPATIENT
Start: 2022-03-29 | End: 2022-03-29 | Stop reason: HOSPADM

## 2022-03-29 RX ORDER — PANTOPRAZOLE SODIUM 40 MG/1
40 TABLET, DELAYED RELEASE ORAL EVERY 12 HOURS
Qty: 60 TABLET | Refills: 0 | Status: SHIPPED | OUTPATIENT
Start: 2022-03-29 | End: 2022-04-29 | Stop reason: ALTCHOICE

## 2022-03-29 RX ORDER — SODIUM CHLORIDE 0.9 % (FLUSH) 0.9 %
5-40 SYRINGE (ML) INJECTION EVERY 8 HOURS
Status: DISCONTINUED | OUTPATIENT
Start: 2022-03-29 | End: 2022-03-29 | Stop reason: HOSPADM

## 2022-03-29 RX ADMIN — SUGAMMADEX 200 MG: 100 INJECTION, SOLUTION INTRAVENOUS at 10:17

## 2022-03-29 RX ADMIN — Medication 10 MG: at 08:39

## 2022-03-29 RX ADMIN — ROCURONIUM BROMIDE 40 MG: 10 INJECTION, SOLUTION INTRAVENOUS at 08:27

## 2022-03-29 RX ADMIN — PHENYLEPHRINE HYDROCHLORIDE 100 MCG: 10 INJECTION INTRAVENOUS at 09:40

## 2022-03-29 RX ADMIN — SODIUM CHLORIDE, SODIUM LACTATE, POTASSIUM CHLORIDE, AND CALCIUM CHLORIDE: 600; 310; 30; 20 INJECTION, SOLUTION INTRAVENOUS at 10:14

## 2022-03-29 RX ADMIN — ROCURONIUM BROMIDE 30 MG: 10 INJECTION, SOLUTION INTRAVENOUS at 09:06

## 2022-03-29 RX ADMIN — HEPARIN SODIUM 1000 UNITS: 1000 INJECTION INTRAVENOUS; SUBCUTANEOUS at 09:16

## 2022-03-29 RX ADMIN — PHENYLEPHRINE HYDROCHLORIDE 50 MCG: 10 INJECTION INTRAVENOUS at 09:36

## 2022-03-29 RX ADMIN — PHENYLEPHRINE HYDROCHLORIDE 50 MCG: 10 INJECTION INTRAVENOUS at 09:34

## 2022-03-29 RX ADMIN — ONDANSETRON 4 MG: 2 INJECTION INTRAMUSCULAR; INTRAVENOUS at 09:56

## 2022-03-29 RX ADMIN — Medication 10 MG: at 08:48

## 2022-03-29 RX ADMIN — FENTANYL CITRATE 100 MCG: 50 INJECTION INTRAMUSCULAR; INTRAVENOUS at 08:27

## 2022-03-29 RX ADMIN — Medication 10 MG: at 09:03

## 2022-03-29 RX ADMIN — PHENYLEPHRINE HYDROCHLORIDE 100 MCG: 10 INJECTION INTRAVENOUS at 09:52

## 2022-03-29 RX ADMIN — PROTAMINE SULFATE 100 MG: 10 INJECTION, SOLUTION INTRAVENOUS at 10:07

## 2022-03-29 RX ADMIN — HEPARIN SODIUM 40 UNITS/KG/HR: 5000 INJECTION, SOLUTION INTRAVENOUS at 08:59

## 2022-03-29 RX ADMIN — HEPARIN SODIUM 7000 UNITS: 1000 INJECTION INTRAVENOUS; SUBCUTANEOUS at 08:55

## 2022-03-29 RX ADMIN — Medication 10 MG: at 08:53

## 2022-03-29 RX ADMIN — PROPOFOL 200 MG: 10 INJECTION, EMULSION INTRAVENOUS at 08:27

## 2022-03-29 RX ADMIN — Medication 10 MG: at 09:22

## 2022-03-29 RX ADMIN — HEPARIN SODIUM 7000 UNITS: 1000 INJECTION INTRAVENOUS; SUBCUTANEOUS at 08:58

## 2022-03-29 RX ADMIN — PHENYLEPHRINE HYDROCHLORIDE 50 MCG: 10 INJECTION INTRAVENOUS at 08:52

## 2022-03-29 RX ADMIN — SODIUM CHLORIDE, SODIUM LACTATE, POTASSIUM CHLORIDE, AND CALCIUM CHLORIDE: 600; 310; 30; 20 INJECTION, SOLUTION INTRAVENOUS at 08:18

## 2022-03-29 NOTE — PROGRESS NOTES
Patient received to [address] [place] [address] room # 17  Ambulatory from [facility]. Patient scheduled for JOHN AFIB ablation today with Dr Ray Andrews. Procedure reviewed & questions answered, voiced good understanding consent obtained & placed on chart. All medications and medical history reviewed. Will prep patient per orders. Patient & family updated on plan of care.

## 2022-03-29 NOTE — Clinical Note
Bilateral groin clipped prepped with ChloraPrep and draped. Wet prep solution applied at: 830. Wet prep solution dried at: 835. Wet prep elapsed drying time: 5 mins.

## 2022-03-29 NOTE — Clinical Note
under hemodynamic and ICE, utilizing a standard needle, Mario 71cm via a guiding sheath. Needle inserted.

## 2022-03-29 NOTE — DISCHARGE INSTRUCTIONS
Ablation Discharge Instructions    *Check the puncture site frequently for swelling or bleeding. If you see any bleeding, lie down and apply pressure over the area with a clean town or washcloth. Notify your doctor for any redness, swelling, drainage or oozing from the puncture site. Notify your doctor for any fever or chills. *If the leg with the puncture becomes cold, numb or painful, call [facility] at  [phone]. *Activity should be limited for the next 48 hours. Climb stairs as little as possible and avoid any stooping, bending or strenuous activity for 48 hours. No heavy lifting (anything over 10 pounds) for three days. *Do not drive for 48 hours. *You may resume your usual diet. Drink more fluids than usual.    *Have a responsible person drive you home and stay with you for at least 24 hours after your ablation. *You may remove the bandage from your Right, Left Groin in 24 hours. You may shower in 24 hours. No tub baths, hot tubs or swimming for one week. Do not place any lotions, creams, powders, ointments over the puncture site for one week. You may place a clean band-aid over the puncture site each day for 5 days. Change this daily. Sedation for a Medical Procedure: Care Instructions     You were given a sedative medication during your visit. While many of the effects will have worn   off before you leave; you may continue to feel some effects for several hours. Common side effects from sedation include:  · Feeling sleepy. (Your doctors and nurses will make sure you are not too sleepy to go home.)  · Nausea and vomiting. This usually does not last long. · Feeling tired. How can you care for yourself at home? Activity    · Don't do anything for 24 hours that requires attention to detail. It takes time for the medicine effects to completely wear off. · Do not make important legal decisions for 24 hours.      · Do not sign any legal documents for 24 hours. · Do not drink alcohol today     · For your safety, you should not drive or operate heavy machinery for the remainder of the day     · Rest when you feel tired. Getting enough sleep will help you recover. Diet    · You can eat your normal diet, unless your doctor gives you other instructions. If your stomach is upset, try clear liquids and bland, low-fat foods like plain toast or rice. · Drink plenty of fluids (unless your doctor tells you not to). · Don't drink alcohol for 24 hours. Medicines    · Be safe with medicines. Read and follow all instructions on the label. · If the doctor gave you a prescription medicine for pain, take it as prescribed. · If you are not taking a prescription pain medicine, ask your doctor if you can take an over-the-counter medicine. · If you think your pain medicine is making you sick to your stomach:  · Take your medicine after meals (unless your doctor has told you not to). · Ask your doctor for a different pain medicine. What to Expect after your Ablation             During the first 48 hours after an ablation, some patients experience:    Mild Chest Discomfort - for a week or so, due to post procedure inflammation. The pain will often worsen with a deep breath or when leaning forward. It should resolve within a week.  Mild shortness of breath with activity    Mild to moderate fatigue - this may last 1-3 weeks    Soreness and bruising in the groin area. This bruising may extend down past the knee. This bruising will go away slowly over a few weeks. During the first month after an ablation, some patients may experience:    Palpitations, fast heart rates can be normal first 6 weeks is the healing phase.     Recurrence of the arrhythmia during this time is not an indicator of failure of the ablation.  You will generally have two sets of puncture sites one on each side of the groin, keep area clean.  You may shower when you get home and remove the band aides. DO NOT go in a tub bath, pool, ocean, or lake until completely healed 7-10 days. Avoid any lotions, powder or creams to the puncture sites.  You may notice a lump at the puncture site smaller than the size of a quarter this is normal.           You will be scheduled with your electrophysiologist in 4-6 weeks after the procedure           Activity Restrictions:    First two days post ablation, you should take it easy.  Do not drive for 24 hours post ablation    Do not lift, pull or push anything greater than 5-10 pounds for 7 days    You may resume normal activity after 1 week, but avoid strenuous activities for 2 weeks      Call us immediately at [phone] for:  Signs of infection, such as fever over 100 degrees F within the first 3 weeks post procedure, drainage from the puncture sites, redness swelling, hot to touch or severe pain.  Lump larger than the size of a quarter near the puncture sites, increasing more painful or seem to be throbbing.  Severe chest pain with deep breath or when leaning forward, or shortness of breath    Slurred speech, difficulties swallowing, loss of sensation, decrease strength in hands or legs or any other stroke like symptoms    Severe chest pain or difficulty breathing      I have read the above instructions and have had the opportunity to ask questions.

## 2022-03-29 NOTE — PROCEDURES
Pre-Electrophysiology Diagnosis  1. Persistent Atrial fibrillation     Procedure Performed  1. EPS afib ablation/pulmonary vein isolation  2. Left atrial pacing recording from the coronary sinus. 3. 3-D Electroanatomical mapping  4. Intracardiac echo  5. Ablation of second arrhythmia  6. LV pacing and recording  7. Transesophageal echo  8. Drug infusion  9. Ablation of additional linear lines x 2. Anesthesia: General     Estimated Blood Loss: Less than 10 mL     Procedure in Detail:  The patient was brought to the electrophysiology lab in the fasting state. The patient was intubated by anesthesiology and an esophageal temperature probe inserted and advanced to a location directly posterior to the LA at the level of the pulmonary veins. The esophageal temperature probe was repositioned throughout the case to a location as close the the ablation catheter as possible. If the esophageal temperature increased 0.5 degrees Celsius ablation was stopped until the temperature returned to baseline. A tranesophageal echocardiogram was performed directly prior to the procedure and was negative for a AUDI thrombus (see full report in chart). A Ref-Star CARTO patch was placed, the patient was then prepped and draped in sterile fashion. Venous access was obtained under ultrasound guidance x4 using modified Seldinger technique, with placement of one 8Fr short sidearm sheath and two 8.5 Fr SL-O 63cm long braided sheaths in the right femoral vein and placement of a 10Fr sheath into the left femoral vein. An intra-cardiac echo probe was prepped, and then inserted into an 10 Fr short sheath and used to localize the fossa ovalis and create LA and pulmonary vein anatomy utilizing [facility]. A BiosDigital Folio Portillo Pentaray catheter was then inserted into an 8.5 SLO Fr sheath and used to create an electroanatomical map of the right atrium, including attempts at His localization and the os of the CS.   Then a Smart Touch porous irrigated BW 3.5mm ablation catheter was used to map out the body of the CS. A decapolar Biosense Portillo CS catheter was inserted via an 8Fr sheath and positioned in the mid coronary sinus. Next, a trans-septal needle was inserted into the SLO and was used to perform a trans-septal puncture with assistance from ICE (intra-cardiac echo) as well as the [address] [place] [address],[address] map and the right atrial impedence map. The SLO sheath was advanced into the LA. Total weight based heparin bolus was administered just after transeptal access, and systemic blood pressure monitored invasively. The patient was then placed on our heparin weight based nomogram for targeted ACT of 300-350 during the ablation procedure. A second trans-septal access was obtained with the ablation catheter. The Pentaray catheter was then inserted into the LA via the SL-O sheath and was used to create a detail electroanatomical voltage map of the left atrium including the pulmonary veins to identify the pulmonary vein sleeves and further guide additional ablation as pictured below. The Pentaray was used to map pulmonary vein potentials and target ablation. An 8 Fr, 3.5mm tip saline irrigated bidirectional D/F curve Thermo-cool SmartTouch catheter was used for RF ablation and ablation was performed at 35-50W with reduction in power as needed if ablation was performed in close proximity to the esophagus. Antral pulmonary vein isolation was then performed for all 4 pulmonary veins. Entrance and exit block was demonstrated by left atrial pacing and within the pulmonary veins. Lack of any conducted atrial EGMs into the pulmonary veins was documented. Prior to ablation of the right antral pulmonary veins, the ablation catheter was used to pace at high output to try to localize the right phrenic nerve and map its location prior to ablation.  Ablation of an additional linear line was performed across the LA roof followed by ablation of another additional linear line across the LA floor to obtain LA posterior wall isolation. Adenosine was injected (6-12 mg) to demonstrate the lack of early reconnection with the Pentaray in the PVs. There was no early reconnection with adenosine. The ablation catheter was inserted into the LV, documented LV electrograms and was used to pace the LV for the EP study and for rescue pacing during adenosine infusion. Post PVI, the Pentaray was used to perform a second LA voltage map post ablation to demonstrate pulmonary vein isolation and post ablation voltage as pictured below. Baseline LA Voltage Map      Post Ablation LA Voltage Map      Cavotricuspid Isthmus ablation (right atrial flutter)  Linear ablation across the cavo-tricuspid isthmus was performed starting with 1:2 A:V EGMs along the isthmus at 6pm Maur[place]. The local electrogram activation sequence, differential pacing maneuvers and electrogram timing was used to demonstrate bidirectional block along the cavotricuspid isthmus. Post-Ablation trans-isthmus time: 246 msec    Next, baseline recordings and a diagnostic EP study was performed. The coronary sinus multipolar catheter was used to pace the left atrium during the EP study. The LA CS electrograms were documented and interpreted during the procedure. A comprehensive EP study was performed with 1:1 AV decremental pacing, atrial extrastimuli and ventricular pacing to assess retrograde conduction. The patient did not have sustained slow pathway conduction or evidence of an accessory pathway. Ventricular pacing revealed no retrograde conduction. At the completion of the ablation and EPS, all long sheaths were exchanged for short 8 Fr sheaths and 100 units of Protamine was delivered to reverse the effect of heparin. Four VASCADE MVP devices were used to close the venotomy sites. The MVP tools were advanced into the 8 Fr and 10 Fr sheaths, respectively; the sheaths were then removed.  The collagen was then deployed and a 30 second dwell time was performed to allow for expansion of the collagen. The delivery tools were then removed with adequate hemostasis. The patient tolerated the procedure well with no acute complications recognized. The patient left the EP lab in stable condition, in normal sinus rhythm. Just prior to pulling shealths, the ICE catheter was used to obtain ultrasound images and revealed no evidence of pericardial effusion. Ablation Data:  Total procedure time: 82 minutes  Left PV WACA isolation time: 3 minutes 45 seconds  Right PV WACA isolation time: 13 minutes 15 seconds  LA Volume: 150 cc     Baseline Intervals    QRS duration: 74 msec  MT interval: 193 msec  RR interval: 1166 msec  AH interval: 98 msec  HV interval: 43 msec    EP Study    AV Wenchebach: 510 msec  AV kurtis ERP:  440 msec  VA Wenchebach: No VA conduction    Complications: None    Summary:   1. Successful pulmonary vein isolation x4.  2. Successful LA posterior wall isolation. 3. Creation of a line of bidirectional block at the cavotricuspid isthmus. 4.         Comprehensive EP study. 5. Pt tolerated the procedure well. 6. Family updated. Florin Keller MD, MS  Clinical Cardiac Electrophysiology  3/29/2022  10:19 AM

## 2022-03-29 NOTE — ANESTHESIA PREPROCEDURE EVALUATION
Relevant Problems   CARDIOVASCULAR   (+) Hypertension   (+) PAF (paroxysmal atrial fibrillation) (HCC)   (+) PVC (premature ventricular contraction)   (+) Persistent atrial fibrillation (HCC)      ENDOCRINE   (+) Obesity       Anesthetic History   No history of anesthetic complications            Review of Systems / Medical History  Patient summary reviewed and pertinent labs reviewed    Pulmonary  Within defined limits                 Neuro/Psych   Within defined limits           Cardiovascular    Hypertension: well controlled        Dysrhythmias : atrial fibrillation  Hyperlipidemia    Exercise tolerance: >4 METS  Comments: TTE 2022: Left Ventricle: Left ventricle is mildly dilated. Normal wall thickness. Mild global hypokinesis present. Mildly reduced left ventricular systolic function with a visually estimated EF of 40 - 45%. Abnormal diastolic function.    GI/Hepatic/Renal         Renal disease: stones       Endo/Other        Obesity and anemia     Other Findings            Physical Exam    Airway  Mallampati: I  TM Distance: 4 - 6 cm  Neck ROM: normal range of motion   Mouth opening: Normal     Cardiovascular  Regular rate and rhythm,  S1 and S2 normal,  no murmur, click, rub, or gallop  Rhythm: regular  Rate: normal         Dental    Dentition: Full upper dentures and Full lower dentures     Pulmonary  Breath sounds clear to auscultation               Abdominal  GI exam deferred       Other Findings            Anesthetic Plan    ASA: 3  Anesthesia type: general          Induction: Intravenous  Anesthetic plan and risks discussed with: Patient and Family

## 2022-03-29 NOTE — PROGRESS NOTES
TRANSFER - IN REPORT:    Verbal report received from [facility] (name) on Jesus Caldwell  being received from PACU (unit) for routine progression of care      Report consisted of patients Situation, Background, Assessment and   Recommendations(SBAR). Information from the following report(s) Procedure Summary was reviewed with the receiving nurse. Opportunity for questions and clarification was provided. Assessment completed upon patients arrival to unit and care assumed.

## 2022-03-29 NOTE — ANESTHESIA POSTPROCEDURE EVALUATION
Procedure(s):  ABLATION A-FIB  W COMPLETE EP STUDY.     general    Anesthesia Post Evaluation      Multimodal analgesia: multimodal analgesia used between 6 hours prior to anesthesia start to PACU discharge  Patient location during evaluation: bedside  Patient participation: complete - patient participated  Level of consciousness: awake and alert  Pain management: adequate  Airway patency: patent  Anesthetic complications: no  Cardiovascular status: acceptable  Respiratory status: acceptable  Hydration status: acceptable  Post anesthesia nausea and vomiting:  controlled  Final Post Anesthesia Temperature Assessment:  Normothermia (36.0-37.5 degrees C)      INITIAL Post-op Vital signs:   Vitals Value Taken Time   /68 03/29/22 1106   Temp 36.3 °C (97.4 °F) 03/29/22 1106   Pulse 54 03/29/22 1106   Resp 14 03/29/22 1106   SpO2 94 % 03/29/22 1106

## 2022-03-29 NOTE — Clinical Note
under hemodynamic and ICE, utilizing a standard needle, Mario 71cm via a guiding sheath. Needle removed.

## 2022-03-29 NOTE — PROGRESS NOTES
Discharge instructions given per orders, voiced good understanding of bilat groin care, medications & follow up care.  Denies any questions

## 2022-03-29 NOTE — PERIOP NOTES
TRANSFER - OUT REPORT:    Verbal report given to Carlie Rodriguez RN on [facility]  being transferred to Cath lab for routine post - op       Report consisted of patients Situation, Background, Assessment and   Recommendations(SBAR). Information from the following report(s) OR Summary, Procedure Summary, Intake/Output and MAR was reviewed with the receiving nurse. Lines:   Peripheral IV 03/29/22 Posterior;Right Hand (Active)   Site Assessment Clean, dry, & intact 03/29/22 1036   Phlebitis Assessment 0 03/29/22 1036   Infiltration Assessment 0 03/29/22 1036   Dressing Status Clean, dry, & intact 03/29/22 1036   Dressing Type Tape;Transparent 03/29/22 1036   Hub Color/Line Status Patent 03/29/22 1036       Peripheral IV 03/29/22 Distal;Left;Posterior Forearm (Active)   Site Assessment Clean, dry, & intact 03/29/22 1036   Phlebitis Assessment 0 03/29/22 1036   Infiltration Assessment 0 03/29/22 1036   Dressing Status Clean, dry, & intact 03/29/22 1036   Dressing Type Tape;Transparent 03/29/22 1036   Hub Color/Line Status Patent 03/29/22 1036        Opportunity for questions and clarification was provided. Patient transported with:   Registered Nurse    VTE prophylaxis orders have not been written for [facility].

## 2022-04-27 DIAGNOSIS — Z11.59 NEED FOR HEPATITIS C SCREENING TEST: Primary | ICD-10-CM

## 2022-04-27 DIAGNOSIS — E78.00 PURE HYPERCHOLESTEROLEMIA: ICD-10-CM

## 2022-04-27 DIAGNOSIS — I10 PRIMARY HYPERTENSION: ICD-10-CM

## 2022-05-23 ENCOUNTER — TELEPHONE (OUTPATIENT)
Dept: UROLOGY | Age: 72
End: 2022-05-23

## 2022-06-08 RX ORDER — AMIODARONE HYDROCHLORIDE 200 MG/1
TABLET ORAL
Qty: 180 TABLET | Refills: 3 | Status: SHIPPED | OUTPATIENT
Start: 2022-06-08 | End: 2022-06-16

## 2022-06-16 ENCOUNTER — OFFICE VISIT (OUTPATIENT)
Dept: CARDIOLOGY CLINIC | Age: 72
End: 2022-06-16
Payer: MEDICARE

## 2022-06-16 VITALS
BODY MASS INDEX: 32.77 KG/M2 | HEIGHT: 71 IN | HEART RATE: 53 BPM | SYSTOLIC BLOOD PRESSURE: 114 MMHG | DIASTOLIC BLOOD PRESSURE: 62 MMHG | WEIGHT: 234.1 LBS

## 2022-06-16 DIAGNOSIS — I48.19 PERSISTENT ATRIAL FIBRILLATION (HCC): Primary | ICD-10-CM

## 2022-06-16 DIAGNOSIS — I10 PRIMARY HYPERTENSION: ICD-10-CM

## 2022-06-16 PROBLEM — N18.30 CHRONIC RENAL DISEASE, STAGE III (HCC): Status: ACTIVE | Noted: 2022-06-16

## 2022-06-16 PROCEDURE — 93000 ELECTROCARDIOGRAM COMPLETE: CPT | Performed by: INTERNAL MEDICINE

## 2022-06-16 PROCEDURE — 3017F COLORECTAL CA SCREEN DOC REV: CPT | Performed by: INTERNAL MEDICINE

## 2022-06-16 PROCEDURE — G8417 CALC BMI ABV UP PARAM F/U: HCPCS | Performed by: INTERNAL MEDICINE

## 2022-06-16 PROCEDURE — 1123F ACP DISCUSS/DSCN MKR DOCD: CPT | Performed by: INTERNAL MEDICINE

## 2022-06-16 PROCEDURE — 99213 OFFICE O/P EST LOW 20 MIN: CPT | Performed by: INTERNAL MEDICINE

## 2022-06-16 PROCEDURE — 1036F TOBACCO NON-USER: CPT | Performed by: INTERNAL MEDICINE

## 2022-06-16 PROCEDURE — G8427 DOCREV CUR MEDS BY ELIG CLIN: HCPCS | Performed by: INTERNAL MEDICINE

## 2022-06-16 NOTE — PROGRESS NOTES
[address] [place], [place]  [address], [address]  PHONE: [phone]  1950    SUBJECTIVE:   Daysi Burrell is a 66 y.o. male seen for a follow up visit regarding the following:     Chief Complaint   Patient presents with    Follow-Up from Hospital     10-12 week afib Abl    Atrial Fibrillation       HPI:    Daysi Burrell is a very pleasant 66 y.o. male with a past medical and cardiac history significant for HTN, PVCs, TAA, and persistent atrial fibrillation failing DCCV 12/21 on amio and  presents for consultation for afib. He has been feeling OK, some fatigue, was noted to be in afib incidentally during colonoscopy. He had failed DCCV and has been on amio since that time. He denies chest pain, SOB, palpitations. Pt presents back s/p afib ablation, doing well, no recurrent AF. Cardiac PMH: (Old records have been reviewed and summarized below)   1) NM stress 1/19/21 - normal perfusion but LVEF 46%   2) Echo: 2/18/21 - LVEF 55-60%, Aortic root is dilated, severe LAE   3) afib - Had episode of Afib with Colonoscopy, started on Xarelto (11/18/21). Leydi Reyes is unaware.               Attempted ALEX/DCCV 12/9/21 - failed started on amiodarone      Reviewed office not Dr. Dawit Gibbs     Past Medical History, Past Surgical History, Family history, Social History, and Medications were all reviewed with the patient today and updated as necessary.      Current Outpatient Medications   Medication Sig Dispense Refill    atorvastatin (LIPITOR) 10 MG tablet Take 10 mg by mouth daily      carvedilol (COREG) 25 MG tablet Take 25 mg by mouth 2 times daily (with meals)      diclofenac sodium (VOLTAREN) 1 % GEL Apply topically 4 times daily as needed      lisinopril (PRINIVIL;ZESTRIL) 20 MG tablet Take 20 mg by mouth 2 times daily      rivaroxaban (XARELTO) 20 MG TABS tablet Take 20 mg by mouth Daily with supper      spironolactone (ALDACTONE) 25 MG tablet Take 25 mg by mouth daily  tamsulosin (FLOMAX) 0.4 MG capsule TAKE 1 CAPSULE BY MOUTH EVERY DAY       No current facility-administered medications for this visit. No Known Allergies  [unfilled]  Past Medical History:   Diagnosis Date    BPH associated with nocturia 10/10/2011    Calculus of kidney     Chronic otitis media     Conductive hearing loss     Deviated nasal septum     Dysfunction of both eustachian tubes     Elevated PSA 2017    External hemorrhoids     Hearing loss     Hyperlipidemia     Hypertension     Mixed hearing loss, bilateral     Obesity     PAF (paroxysmal atrial fibrillation) (HCC)     Perforation of left tympanic membrane     Right chronic serous otitis media     SNHL (sensorineural hearing loss)      Past Surgical History:   Procedure Laterality Date    COLONOSCOPY N/A 2021    COLONOSCOPY/ 32 performed by [facility]ard [facility] at [facility] ENDOSCOPY    COLONOSCOPY  2014    Hyperplastic Polyps, diverticulosis - Repeat in 7 years    COLONOSCOPY-HIGH RISK   2021         MYRINGOTOMY Bilateral 2016    with U-tube placement     Family History   Problem Relation Age of Onset    Other Brother         COLITIS     Social History     Tobacco Use    Smoking status: Former Smoker     Quit date: 1980     Years since quittin.4    Smokeless tobacco: Never Used   Substance Use Topics    Alcohol use:  Yes     Alcohol/week: 7.0 standard drinks       PHYSICAL EXAM:    Pulse 53   Ht 5' 11\" (1.803 m)   Wt 234 lb 1.6 oz (106.2 kg)   BMI 32.65 kg/m²      Wt Readings from Last 5 Encounters:   22 234 lb 1.6 oz (106.2 kg)   06/10/22 232 lb (105.2 kg)   22 232 lb (105.2 kg)   22 232 lb (105.2 kg)   22 230 lb (104.3 kg)       BP Readings from Last 5 Encounters:   06/10/22 112/60   22 116/82   22 132/84   22 128/76   22 134/70       General appearance: Alert, well appearing, and in no distress   CV: RRR, no M/R/G, no JVD, normal distal pulses, no lower extremity edema  Pulm: Clear to auscultation, no wheezes, no crackles, no accessory muscle use  GI: Soft, nontender, nondistended  Neuro: Alert and oriented    Medical problems and test results were reviewed with the patient today. Lab Results   Component Value Date    K 4.6 03/25/2022     CREATININE   Date Value Ref Range Status   03/25/2022 1.40 0.8 - 1.5 MG/DL Final     Lab Results   Component Value Date    HGB 12.5 03/25/2022     Lab Results   Component Value Date    INR 2.2 03/25/2022    INR 2.1 02/07/2022       Lab Results   Component Value Date    WBC 5.8 03/25/2022    HGB 12.5 (L) 03/25/2022    HCT 39.4 (L) 03/25/2022    MCV 97.3 03/25/2022     (L) 03/25/2022      Lab Results   Component Value Date     03/25/2022    K 4.6 03/25/2022     03/25/2022    CO2 27 03/25/2022    BUN 29 03/25/2022    CREATININE 1.40 03/25/2022    GLUCOSE 109 03/25/2022    CALCIUM 9.1 03/25/2022         EKG: (EKG has been independently visualized by me with interpretation below)  Sinus  Bradycardia, rate 53, normal axis   WITHIN NORMAL LIMITS    Promise Aguero was seen today for follow-up from hospital and atrial fibrillation. Diagnoses and all orders for this visit:    Persistent atrial fibrillation ([address]  -     EKG 12 lead        ASSESSMENT and PLAN  1. Persistent atrial fibrillation on amiodarone: doing well post ablation, maintaining NSR off antiarrhythmics      2. CVA protection: cont xarelto 20mg       3. Amiodarone: discontinued    Patient has been instructed and agrees to call our office with any issues or other concerns related to their cardiac condition(s) and/or complaint(s). No follow-ups on file. Jeff Doyle MD, MS  Cardiology/Electrophysiology  6/16/2022  4:30 PM

## 2022-08-03 ENCOUNTER — OFFICE VISIT (OUTPATIENT)
Dept: UROLOGY | Age: 72
End: 2022-08-03
Payer: MEDICARE

## 2022-08-03 DIAGNOSIS — R97.20 ELEVATED PSA: ICD-10-CM

## 2022-08-03 DIAGNOSIS — N40.1 BPH ASSOCIATED WITH NOCTURIA: Primary | ICD-10-CM

## 2022-08-03 DIAGNOSIS — R35.1 BPH ASSOCIATED WITH NOCTURIA: Primary | ICD-10-CM

## 2022-08-03 LAB
BILIRUBIN, URINE, POC: NEGATIVE
BLOOD URINE, POC: NORMAL
GLUCOSE URINE, POC: NEGATIVE
KETONES, URINE, POC: NEGATIVE
LEUKOCYTE ESTERASE, URINE, POC: NEGATIVE
NITRITE, URINE, POC: NEGATIVE
PH, URINE, POC: 6 (ref 4.6–8)
PROTEIN,URINE, POC: NEGATIVE
SPECIFIC GRAVITY, URINE, POC: 1.02 (ref 1–1.03)
URINALYSIS CLARITY, POC: NORMAL
URINALYSIS COLOR, POC: NORMAL
UROBILINOGEN, POC: NORMAL

## 2022-08-03 PROCEDURE — 1036F TOBACCO NON-USER: CPT | Performed by: UROLOGY

## 2022-08-03 PROCEDURE — 3017F COLORECTAL CA SCREEN DOC REV: CPT | Performed by: UROLOGY

## 2022-08-03 PROCEDURE — G8427 DOCREV CUR MEDS BY ELIG CLIN: HCPCS | Performed by: UROLOGY

## 2022-08-03 PROCEDURE — 99213 OFFICE O/P EST LOW 20 MIN: CPT | Performed by: UROLOGY

## 2022-08-03 PROCEDURE — 81003 URINALYSIS AUTO W/O SCOPE: CPT | Performed by: UROLOGY

## 2022-08-03 PROCEDURE — G8417 CALC BMI ABV UP PARAM F/U: HCPCS | Performed by: UROLOGY

## 2022-08-03 PROCEDURE — 1123F ACP DISCUSS/DSCN MKR DOCD: CPT | Performed by: UROLOGY

## 2022-08-03 RX ORDER — PANTOPRAZOLE SODIUM 40 MG/1
40 TABLET, DELAYED RELEASE ORAL DAILY
COMMUNITY

## 2022-08-03 ASSESSMENT — ENCOUNTER SYMPTOMS
BACK PAIN: 0
NAUSEA: 0

## 2022-08-03 NOTE — PROGRESS NOTES
sclerotic lesion in the right occipital   calvaria at the site of the increased uptake on the bone scan most likely a   benign hemangioma. No lesions present to strongly suspect prostate metastatic   disease. PSA 4.6 on 5-18-22:   He has ablation for atrial fib, on Xarelto. Past Medical History:   Diagnosis Date    BPH associated with nocturia 10/10/2011    Calculus of kidney     Chronic otitis media     Chronic renal disease, stage III [facility]) [[id number]] 6/16/2022    Conductive hearing loss     Deviated nasal septum     Dysfunction of both eustachian tubes     Elevated PSA 5/22/2017    External hemorrhoids     Hearing loss     Hyperlipidemia     Hypertension     Mixed hearing loss, bilateral     Obesity     PAF (paroxysmal atrial fibrillation) ([address]     Perforation of left tympanic membrane     Right chronic serous otitis media     SNHL (sensorineural hearing loss)      Past Surgical History:   Procedure Laterality Date    COLONOSCOPY N/A 11/18/2021    COLONOSCOPY/ 32 performed by Elsie Nuñez MD at [facility] ENDOSCOPY    COLONOSCOPY  05/21/2014    Hyperplastic Polyps, diverticulosis - Repeat in 7 years    COLONOSCOPY-HIGH RISK   11/24/2021         MYRINGOTOMY Bilateral 01/25/2016    with U-tube placement     Current Outpatient Medications   Medication Sig Dispense Refill    pantoprazole (PROTONIX) 40 MG tablet Take 40 mg by mouth in the morning.       atorvastatin (LIPITOR) 10 MG tablet Take 10 mg by mouth daily      carvedilol (COREG) 25 MG tablet Take 25 mg by mouth 2 times daily (with meals)      diclofenac sodium (VOLTAREN) 1 % GEL Apply topically 4 times daily as needed      lisinopril (PRINIVIL;ZESTRIL) 20 MG tablet Take 20 mg by mouth 2 times daily      rivaroxaban (XARELTO) 20 MG TABS tablet Take 20 mg by mouth Daily with supper      spironolactone (ALDACTONE) 25 MG tablet Take 25 mg by mouth daily      tamsulosin (FLOMAX) 0.4 MG capsule TAKE 1 CAPSULE BY MOUTH EVERY DAY       No current facility-administered medications for this visit. No Known Allergies  Social History     Socioeconomic History    Marital status:      Spouse name: Not on file    Number of children: Not on file    Years of education: Not on file    Highest education level: Not on file   Occupational History    Not on file   Tobacco Use    Smoking status: Former     Types: Cigarettes     Quit date: 1980     Years since quittin.6    Smokeless tobacco: Never   Substance and Sexual Activity    Alcohol use: Yes     Alcohol/week: 7.0 standard drinks    Drug use: No    Sexual activity: Not on file   Other Topics Concern    Not on file   Social History Narrative    Not on file     Social Determinants of Health     Financial Resource Strain: Not on file   Food Insecurity: Not on file   Transportation Needs: Not on file   Physical Activity: Not on file   Stress: Not on file   Social Connections: Not on file   Intimate Partner Violence: Not on file   Housing Stability: Not on file     Family History   Problem Relation Age of Onset    Other Brother         COLITIS       Review of Systems  Constitutional:   Negative for fever. Cardiovascular:  Negative for chest pain. GI:  Negative for nausea. Genitourinary:  Negative for urinary burning. Musculoskeletal:  Negative for back pain. There were no vitals taken for this visit.     Urinalysis  UA - Dipstick  Results for orders placed or performed in visit on 22   AMB POC URINALYSIS DIP STICK AUTO W/O MICRO   Result Value Ref Range    Color (UA POC)      Clarity (UA POC)      Glucose, Urine, POC Negative Negative    Bilirubin, Urine, POC Negative Negative    KETONES, Urine, POC Negative Negative    Specific Gravity, Urine, POC 1.020 1.001 - 1.035    Blood (UA POC) Trace-intact Negative    pH, Urine, POC 6.0 4.6 - 8.0    Protein, Urine, POC Negative Negative    Urobilinogen, POC Normal     Nitrite, Urine, POC Negative Negative    Leukocyte Esterase, Urine, POC Negative Negative       UA - Micro  WBC - 0  RBC - 0  Bacteria - 0  Epith - 0    Physical Exam    Assessment and Plan    ICD-10-CM    1. BPH associated with nocturia  N40.1 AMB POC URINALYSIS DIP STICK AUTO W/O MICRO    R35.1       2. Elevated PSA  R97.20 PSA, Diagnostic          Orders Placed This Encounter   Procedures    PSA, Diagnostic     Standing Status:   Future     Standing Expiration Date:   2/3/2024    AMB POC URINALYSIS DIP STICK AUTO W/O MICRO     Doing well on tamsulosin. PSA stable. Follow-up and Dispositions    Return in about 1 year (around 8/3/2023) for appt, PSA before.

## 2022-08-17 ENCOUNTER — OFFICE VISIT (OUTPATIENT)
Dept: INTERNAL MEDICINE CLINIC | Facility: CLINIC | Age: 72
End: 2022-08-17
Payer: MEDICARE

## 2022-08-17 VITALS
WEIGHT: 240 LBS | SYSTOLIC BLOOD PRESSURE: 132 MMHG | HEIGHT: 71 IN | OXYGEN SATURATION: 98 % | HEART RATE: 67 BPM | TEMPERATURE: 98.6 F | DIASTOLIC BLOOD PRESSURE: 75 MMHG | BODY MASS INDEX: 33.6 KG/M2

## 2022-08-17 DIAGNOSIS — D64.9 ANEMIA, UNSPECIFIED TYPE: ICD-10-CM

## 2022-08-17 DIAGNOSIS — I48.0 PAF (PAROXYSMAL ATRIAL FIBRILLATION) (HCC): ICD-10-CM

## 2022-08-17 DIAGNOSIS — R97.20 ELEVATED PSA: ICD-10-CM

## 2022-08-17 DIAGNOSIS — I10 PRIMARY HYPERTENSION: ICD-10-CM

## 2022-08-17 DIAGNOSIS — E66.09 CLASS 1 OBESITY DUE TO EXCESS CALORIES WITH BODY MASS INDEX (BMI) OF 33.0 TO 33.9 IN ADULT, UNSPECIFIED WHETHER SERIOUS COMORBIDITY PRESENT: ICD-10-CM

## 2022-08-17 DIAGNOSIS — N18.31 STAGE 3A CHRONIC KIDNEY DISEASE (HCC): ICD-10-CM

## 2022-08-17 DIAGNOSIS — E78.00 HYPERCHOLESTEROLEMIA: Primary | ICD-10-CM

## 2022-08-17 DIAGNOSIS — I71.20 THORACIC AORTIC ANEURYSM WITHOUT RUPTURE: ICD-10-CM

## 2022-08-17 PROBLEM — E66.9 OBESITY: Status: ACTIVE | Noted: 2017-05-22

## 2022-08-17 PROBLEM — K92.1 HEMATOCHEZIA: Status: ACTIVE | Noted: 2022-03-16

## 2022-08-17 PROCEDURE — 1036F TOBACCO NON-USER: CPT | Performed by: INTERNAL MEDICINE

## 2022-08-17 PROCEDURE — G8417 CALC BMI ABV UP PARAM F/U: HCPCS | Performed by: INTERNAL MEDICINE

## 2022-08-17 PROCEDURE — 99214 OFFICE O/P EST MOD 30 MIN: CPT | Performed by: INTERNAL MEDICINE

## 2022-08-17 PROCEDURE — 3017F COLORECTAL CA SCREEN DOC REV: CPT | Performed by: INTERNAL MEDICINE

## 2022-08-17 PROCEDURE — 1123F ACP DISCUSS/DSCN MKR DOCD: CPT | Performed by: INTERNAL MEDICINE

## 2022-08-17 PROCEDURE — G8428 CUR MEDS NOT DOCUMENT: HCPCS | Performed by: INTERNAL MEDICINE

## 2022-08-17 ASSESSMENT — ENCOUNTER SYMPTOMS
RECTAL PAIN: 0
CHOKING: 0
EYE PAIN: 0
VOICE CHANGE: 0
STRIDOR: 0

## 2022-08-17 ASSESSMENT — PATIENT HEALTH QUESTIONNAIRE - PHQ9
1. LITTLE INTEREST OR PLEASURE IN DOING THINGS: 0
2. FEELING DOWN, DEPRESSED OR HOPELESS: 0
SUM OF ALL RESPONSES TO PHQ QUESTIONS 1-9: 0
SUM OF ALL RESPONSES TO PHQ9 QUESTIONS 1 & 2: 0
SUM OF ALL RESPONSES TO PHQ QUESTIONS 1-9: 0

## 2022-08-17 NOTE — PROGRESS NOTES
FOLLOWUP VISIT    Subjective:    Mr. Harlan Sotelo is a 66 y.o., male,   Chief Complaint   Patient presents with    Follow-up Chronic Condition       HPI:    Patient presents today for follow up of two or more chronic medical problems and review of labs. The patient has benign prostatic hyperplasia. The patient has been tolerating the BPH medication(s) without side effect and is satisfied with his current voiding pattern. The patient has hyperlipidemia. The patient has been following a low cholesterol diet and denies any myalgias or weakness on current lipid lowering therapy. The patient has hypertension. The patient has been on an attempted low sodium diet and has been trying to exercise and maintain a healthy weight. The patient reports good compliance with the blood pressure medications and good blood pressure readings on home / ambulatory monitoring. His hemorrhoidal bleeding has abated.       The following portions of the patient's history were reviewed and updated as appropriate:      Past Medical History:   Diagnosis Date    BPH associated with nocturia 10/10/2011    Calculus of kidney     Chronic otitis media     Chronic renal disease, stage III [facility]) [[id number]] 6/16/2022    Conductive hearing loss     Deviated nasal septum     Dysfunction of both eustachian tubes     Elevated PSA 5/22/2017    External hemorrhoids     Hearing loss     Hyperlipidemia     Hypertension     Mixed hearing loss, bilateral     Obesity     PAF (paroxysmal atrial fibrillation) ([facility] [address]     Perforation of left tympanic membrane     Right chronic serous otitis media     SNHL (sensorineural hearing loss)        Past Surgical History:   Procedure Laterality Date    COLONOSCOPY N/A 11/18/2021    COLONOSCOPY/ 32 performed by Starr Sarabia MD at [facility] ENDOSCOPY    COLONOSCOPY  05/21/2014    Hyperplastic Polyps, diverticulosis - Repeat in 7 years    [facility] RISK   11/24/2021         MYRINGOTOMY Bilateral 01/25/2016 with U-tube placement       Family History   Problem Relation Age of Onset    Other Brother         COLITIS       Social History     Socioeconomic History    Marital status:      Spouse name: Not on file    Number of children: Not on file    Years of education: Not on file    Highest education level: Not on file   Occupational History    Not on file   Tobacco Use    Smoking status: Former     Types: Cigarettes     Quit date: 1980     Years since quittin.6    Smokeless tobacco: Never   Substance and Sexual Activity    Alcohol use: Yes     Alcohol/week: 7.0 standard drinks    Drug use: No    Sexual activity: Not on file   Other Topics Concern    Not on file   Social History Narrative    Not on file     Social Determinants of Health     Financial Resource Strain: Not on file   Food Insecurity: Not on file   Transportation Needs: Not on file   Physical Activity: Not on file   Stress: Not on file   Social Connections: Not on file   Intimate Partner Violence: Not on file   Housing Stability: Not on file       Current Outpatient Medications   Medication Sig Dispense Refill    pantoprazole (PROTONIX) 40 MG tablet Take 40 mg by mouth in the morning. atorvastatin (LIPITOR) 10 MG tablet Take 10 mg by mouth daily      carvedilol (COREG) 25 MG tablet Take 25 mg by mouth 2 times daily (with meals)      diclofenac sodium (VOLTAREN) 1 % GEL Apply topically 4 times daily as needed      lisinopril (PRINIVIL;ZESTRIL) 20 MG tablet Take 20 mg by mouth 2 times daily      rivaroxaban (XARELTO) 20 MG TABS tablet Take 20 mg by mouth Daily with supper      spironolactone (ALDACTONE) 25 MG tablet Take 25 mg by mouth daily      tamsulosin (FLOMAX) 0.4 MG capsule TAKE 1 CAPSULE BY MOUTH EVERY DAY       No current facility-administered medications for this visit. Allergies as of 2022    (No Known Allergies)       Review of Systems   Constitutional:  Negative for activity change and appetite change.    HENT: Negative for drooling and voice change. Eyes:  Negative for pain. Respiratory:  Negative for choking and stridor. Gastrointestinal:  Negative for rectal pain. Endocrine: Negative for polydipsia and polyphagia. Genitourinary:  Negative for enuresis and penile pain. Musculoskeletal:  Negative for gait problem and neck stiffness. Skin:  Negative for pallor. Allergic/Immunologic: Negative for immunocompromised state. Neurological:  Negative for facial asymmetry and speech difficulty. Hematological:  Does not bruise/bleed easily. Psychiatric/Behavioral:  Negative for self-injury. The patient is not hyperactive. Patient Care Team:  Megan Zaldivar MD as PCP - General  Megan Zaldivar MD as PCP - [address] [place] [address] Dr Mcmahan Provider    Objective:    /75 (Site: Left Upper Arm, Position: Sitting)   Pulse 67   Temp 98.6 °F (37 °C) (Temporal)   Ht 5' 11\" (1.803 m)   Wt 240 lb (108.9 kg)   SpO2 98%   BMI 33.47 kg/m²     Physical Exam  Vitals reviewed. Constitutional:       General: He is not in acute distress. Appearance: Normal appearance. He is not toxic-appearing. HENT:      Head: Normocephalic and atraumatic. Right Ear: Tympanic membrane, ear canal and external ear normal.      Left Ear: Tympanic membrane, ear canal and external ear normal.      Nose: Nose normal.      Mouth/Throat:      Mouth: Mucous membranes are moist.      Pharynx: Oropharynx is clear. Eyes:      General: No scleral icterus. Extraocular Movements: Extraocular movements intact. Conjunctiva/sclera: Conjunctivae normal.      Pupils: Pupils are equal, round, and reactive to light. Cardiovascular:      Rate and Rhythm: Normal rate and regular rhythm. Pulses: Normal pulses. Heart sounds: Normal heart sounds. Pulmonary:      Breath sounds: Normal breath sounds. Abdominal:      General: Abdomen is flat. Bowel sounds are normal.      Palpations: Abdomen is soft. There is no mass.       Tenderness: There is no guarding or rebound. Musculoskeletal:         General: Normal range of motion. Cervical back: Normal range of motion and neck supple. Skin:     General: Skin is warm and dry. Coloration: Skin is not jaundiced. Neurological:      Mental Status: He is alert and oriented to person, place, and time. Mental status is at baseline. Psychiatric:         Behavior: Behavior normal.         Thought Content: Thought content normal.            Office Visit on 08/03/2022   Component Date Value Ref Range Status    Glucose, Urine, POC 08/03/2022 Negative  Negative Final    Bilirubin, Urine, POC 08/03/2022 Negative  Negative Final    KETONES, Urine, POC 08/03/2022 Negative  Negative Final    Specific Gravity, Urine, POC 08/03/2022 1.020  1.001 - 1.035 Final    Blood (UA POC) 08/03/2022 Trace-intact  Negative Final    pH, Urine, POC 08/03/2022 6.0  4.6 - 8.0 Final    Protein, Urine, POC 08/03/2022 Negative  Negative Final    Urobilinogen, POC 08/03/2022 Normal   Final    Nitrite, Urine, POC 08/03/2022 Negative  Negative Final    Leukocyte Esterase, Urine, POC 08/03/2022 Negative  Negative Final         Assessent & Plan:        1. Hypercholesterolemia  Overview:  1/11/21 total 137, HDL 56, LDL 65, trig 83 on atorvastatin 10 mg daily. Reviewed the most recent lipid panel with the patient, and interpreted the results within the context of the patient's personal cardiovascular risk factors. Discussed/reinforced a low-cholesterol diet. The patient will continue current intensity statin therapy. Orders:  -     Lipid Panel; Future  2. Thoracic aortic aneurysm without rupture ([address]  Overview:  3/8/21 CTA chest - 4.4 cm maximal diameter thoracic ascending aorta. 2/18/21 echo - mild cLVH / diastolic dysfunction. EF 56%,  Severe LAE. Dilated ascending aorta. Followed by cardiology Dr. Radha Salomon. Plan to monitor over time.     3. PAF (paroxysmal atrial fibrillation) ([facility])  Overview:  Continue carvedilol and Xarelto. Follow up with cardiology as scheduled. 4. Class 1 obesity due to excess calories with body mass index (BMI) of 33.0 to 33.9 in adult, unspecified whether serious comorbidity present  Overview:  11/11/19 TSH 1.430. Reviewed the patient's BMI. Discussed the need to lose weight in order to avoid many preventable illnesses. Discussed diet, exercise, and weight loss strategies. 5. Primary hypertension  Overview:  Well controlled on current medications (carvedilol 25 mg po BID, lisinopril 20 mg daily, aldactone 25 mg daily). The patient will continue the current treatment. Orders:  -     Comprehensive Metabolic Panel; Future  6. Elevated PSA  Overview: Followed by urology Dr. Sis Dhillon. 1/15/18 prostate biopsy negative. 7. Stage 3a chronic kidney disease ([facility] [address]  Overview:  3/25/22 creatinine 1.40, eGFR 53    Labs were reviewed and discussed with patient. The patient was advised to avoid NSAIDs and other nephrotoxins. Will dose adjust medications as appropriate. Will monitor labs over time. 8. Anemia, unspecified type  Overview:  Patient had mild anemia on March labs most likely due to chronic hemorrhoidal bleeding. He states his hemorrhoid bleeding has abated. Repeat CBC and other anemia labs before next visit. Orders:  -     CBC with Auto Differential; Future  -     Ferritin; Future  -     Vitamin B12; Future  -     Folate; Future        The patient and/or patient representative voiced understanding and agreement with the current diagnoses, recommendations, and possible side effects. Return in about 3 months (around 11/17/2022) for follow up of chronic medical problems, review labs.

## 2022-10-07 ENCOUNTER — TELEPHONE (OUTPATIENT)
Dept: FAMILY MEDICINE CLINIC | Facility: CLINIC | Age: 72
End: 2022-10-07

## 2022-10-07 NOTE — TELEPHONE ENCOUNTER
Daughter called to inform us that pt's wife passed away last night. Daughter wanted to know if there was any symptoms to be aware of regarding her father's new diagnosis of a-fib. Recommended calling back or taking him to ER if he develops any SOB, palpitations or dizziness.

## 2022-11-09 DIAGNOSIS — D64.9 ANEMIA, UNSPECIFIED TYPE: ICD-10-CM

## 2022-11-09 DIAGNOSIS — E78.00 HYPERCHOLESTEROLEMIA: ICD-10-CM

## 2022-11-09 DIAGNOSIS — I10 PRIMARY HYPERTENSION: ICD-10-CM

## 2022-11-10 LAB
ALBUMIN SERPL-MCNC: 4 G/DL (ref 3.2–4.6)
ALBUMIN/GLOB SERPL: 1.4 {RATIO} (ref 0.4–1.6)
ALP SERPL-CCNC: 57 U/L (ref 50–136)
ALT SERPL-CCNC: 30 U/L (ref 12–65)
ANION GAP SERPL CALC-SCNC: 7 MMOL/L (ref 2–11)
AST SERPL-CCNC: 12 U/L (ref 15–37)
BASOPHILS # BLD: 0 K/UL (ref 0–0.2)
BASOPHILS NFR BLD: 1 % (ref 0–2)
BILIRUB SERPL-MCNC: 0.5 MG/DL (ref 0.2–1.1)
BUN SERPL-MCNC: 36 MG/DL (ref 8–23)
CALCIUM SERPL-MCNC: 9.3 MG/DL (ref 8.3–10.4)
CHLORIDE SERPL-SCNC: 106 MMOL/L (ref 101–110)
CHOLEST SERPL-MCNC: 121 MG/DL
CO2 SERPL-SCNC: 22 MMOL/L (ref 21–32)
CREAT SERPL-MCNC: 1.5 MG/DL (ref 0.8–1.5)
DIFFERENTIAL METHOD BLD: ABNORMAL
EOSINOPHIL # BLD: 0.1 K/UL (ref 0–0.8)
EOSINOPHIL NFR BLD: 1 % (ref 0.5–7.8)
ERYTHROCYTE [DISTWIDTH] IN BLOOD BY AUTOMATED COUNT: 13.6 % (ref 11.9–14.6)
FERRITIN SERPL-MCNC: 249 NG/ML (ref 8–388)
FOLATE SERPL-MCNC: 3.8 NG/ML (ref 3.1–17.5)
GLOBULIN SER CALC-MCNC: 2.8 G/DL (ref 2.8–4.5)
GLUCOSE SERPL-MCNC: 110 MG/DL (ref 65–100)
HCT VFR BLD AUTO: 35.6 % (ref 41.1–50.3)
HDLC SERPL-MCNC: 41 MG/DL (ref 40–60)
HDLC SERPL: 3 {RATIO}
HGB BLD-MCNC: 11.4 G/DL (ref 13.6–17.2)
IMM GRANULOCYTES # BLD AUTO: 0 K/UL (ref 0–0.5)
IMM GRANULOCYTES NFR BLD AUTO: 0 % (ref 0–5)
LDLC SERPL CALC-MCNC: 54.6 MG/DL
LYMPHOCYTES # BLD: 1.5 K/UL (ref 0.5–4.6)
LYMPHOCYTES NFR BLD: 25 % (ref 13–44)
MCH RBC QN AUTO: 33 PG (ref 26.1–32.9)
MCHC RBC AUTO-ENTMCNC: 32 G/DL (ref 31.4–35)
MCV RBC AUTO: 103.2 FL (ref 82–102)
MONOCYTES # BLD: 0.5 K/UL (ref 0.1–1.3)
MONOCYTES NFR BLD: 8 % (ref 4–12)
NEUTS SEG # BLD: 3.9 K/UL (ref 1.7–8.2)
NEUTS SEG NFR BLD: 65 % (ref 43–78)
NRBC # BLD: 0 K/UL (ref 0–0.2)
PLATELET # BLD AUTO: 162 K/UL (ref 150–450)
PMV BLD AUTO: 11.8 FL (ref 9.4–12.3)
POTASSIUM SERPL-SCNC: 5.2 MMOL/L (ref 3.5–5.1)
PROT SERPL-MCNC: 6.8 G/DL (ref 6.3–8.2)
RBC # BLD AUTO: 3.45 M/UL (ref 4.23–5.6)
SODIUM SERPL-SCNC: 135 MMOL/L (ref 133–143)
TRIGL SERPL-MCNC: 127 MG/DL (ref 35–150)
VIT B12 SERPL-MCNC: 270 PG/ML (ref 193–986)
VLDLC SERPL CALC-MCNC: 25.4 MG/DL (ref 6–23)
WBC # BLD AUTO: 6 K/UL (ref 4.3–11.1)

## 2022-11-17 ENCOUNTER — OFFICE VISIT (OUTPATIENT)
Dept: INTERNAL MEDICINE CLINIC | Facility: CLINIC | Age: 72
End: 2022-11-17
Payer: MEDICARE

## 2022-11-17 VITALS
HEIGHT: 71 IN | SYSTOLIC BLOOD PRESSURE: 120 MMHG | HEART RATE: 73 BPM | WEIGHT: 235.4 LBS | DIASTOLIC BLOOD PRESSURE: 80 MMHG | BODY MASS INDEX: 32.96 KG/M2 | OXYGEN SATURATION: 98 % | RESPIRATION RATE: 18 BRPM

## 2022-11-17 DIAGNOSIS — K64.4 EXTERNAL HEMORRHOID, BLEEDING: ICD-10-CM

## 2022-11-17 DIAGNOSIS — E78.00 HYPERCHOLESTEROLEMIA: ICD-10-CM

## 2022-11-17 DIAGNOSIS — E66.09 CLASS 1 OBESITY DUE TO EXCESS CALORIES WITH BODY MASS INDEX (BMI) OF 33.0 TO 33.9 IN ADULT, UNSPECIFIED WHETHER SERIOUS COMORBIDITY PRESENT: ICD-10-CM

## 2022-11-17 DIAGNOSIS — I48.0 PAF (PAROXYSMAL ATRIAL FIBRILLATION) (HCC): ICD-10-CM

## 2022-11-17 DIAGNOSIS — I10 HYPERTENSION, ESSENTIAL: ICD-10-CM

## 2022-11-17 DIAGNOSIS — N18.31 STAGE 3A CHRONIC KIDNEY DISEASE (HCC): ICD-10-CM

## 2022-11-17 DIAGNOSIS — K63.5 POLYP OF COLON, UNSPECIFIED PART OF COLON, UNSPECIFIED TYPE: ICD-10-CM

## 2022-11-17 DIAGNOSIS — D53.9 MACROCYTIC ANEMIA: ICD-10-CM

## 2022-11-17 DIAGNOSIS — R35.1 BPH ASSOCIATED WITH NOCTURIA: ICD-10-CM

## 2022-11-17 DIAGNOSIS — Z11.59 NEED FOR HEPATITIS C SCREENING TEST: ICD-10-CM

## 2022-11-17 DIAGNOSIS — Z23 ENCOUNTER FOR IMMUNIZATION: ICD-10-CM

## 2022-11-17 DIAGNOSIS — I48.19 PERSISTENT ATRIAL FIBRILLATION (HCC): Primary | ICD-10-CM

## 2022-11-17 DIAGNOSIS — N40.1 BPH ASSOCIATED WITH NOCTURIA: ICD-10-CM

## 2022-11-17 PROCEDURE — G8484 FLU IMMUNIZE NO ADMIN: HCPCS | Performed by: INTERNAL MEDICINE

## 2022-11-17 PROCEDURE — 3017F COLORECTAL CA SCREEN DOC REV: CPT | Performed by: INTERNAL MEDICINE

## 2022-11-17 PROCEDURE — G8417 CALC BMI ABV UP PARAM F/U: HCPCS | Performed by: INTERNAL MEDICINE

## 2022-11-17 PROCEDURE — 99214 OFFICE O/P EST MOD 30 MIN: CPT | Performed by: INTERNAL MEDICINE

## 2022-11-17 PROCEDURE — 1123F ACP DISCUSS/DSCN MKR DOCD: CPT | Performed by: INTERNAL MEDICINE

## 2022-11-17 PROCEDURE — 3074F SYST BP LT 130 MM HG: CPT | Performed by: INTERNAL MEDICINE

## 2022-11-17 PROCEDURE — 3078F DIAST BP <80 MM HG: CPT | Performed by: INTERNAL MEDICINE

## 2022-11-17 PROCEDURE — 1036F TOBACCO NON-USER: CPT | Performed by: INTERNAL MEDICINE

## 2022-11-17 PROCEDURE — G8427 DOCREV CUR MEDS BY ELIG CLIN: HCPCS | Performed by: INTERNAL MEDICINE

## 2022-11-17 RX ORDER — SPIRONOLACTONE 25 MG/1
25 TABLET ORAL DAILY
Qty: 90 TABLET | Refills: 2 | Status: SHIPPED | OUTPATIENT
Start: 2022-11-17

## 2022-11-17 RX ORDER — FOLIC ACID 1 MG/1
1 TABLET ORAL DAILY
Qty: 90 TABLET | Refills: 1 | Status: SHIPPED | OUTPATIENT
Start: 2022-11-17

## 2022-11-17 RX ORDER — ATORVASTATIN CALCIUM 10 MG/1
10 TABLET, FILM COATED ORAL DAILY
Qty: 90 TABLET | Refills: 2 | Status: SHIPPED | OUTPATIENT
Start: 2022-11-17

## 2022-11-17 RX ORDER — CARVEDILOL 25 MG/1
25 TABLET ORAL 2 TIMES DAILY WITH MEALS
Qty: 180 TABLET | Refills: 2 | Status: SHIPPED | OUTPATIENT
Start: 2022-11-17

## 2022-11-17 ASSESSMENT — ENCOUNTER SYMPTOMS
STRIDOR: 0
VOICE CHANGE: 0
CHOKING: 0
RECTAL PAIN: 0
EYE PAIN: 0

## 2022-11-17 NOTE — PROGRESS NOTES
FOLLOWUP VISIT    Subjective:    Mr. Maggy Wilson is a 66 y.o., male,   Chief Complaint   Patient presents with    Follow-up     3 month f/u. HPI:    Patient presents today for follow up of two or more chronic medical problems and review of labs. The patient has hypertension. The patient has been on an attempted low sodium diet and has been trying to exercise and maintain a healthy weight. The patient reports good compliance with the blood pressure medications. The patient has GERD. The patient has been on attempted therapeutic lifestyle change including smaller more frequent meals and avoiding caffeine. The patient states that the GERD symptoms have been well controlled on current treatment and denies any dysphagia. The patient has hyperlipidemia. The patient has been following a low cholesterol diet and denies any myalgias or weakness on current lipid lowering therapy. The patient has benign prostatic hyperplasia. The patient has been tolerating the BPH medication(s) without side effect and is satisfied with his current voiding pattern.     The following portions of the patient's history were reviewed and updated as appropriate:      Past Medical History:   Diagnosis Date    BPH associated with nocturia 10/10/2011    Calculus of kidney     Chronic otitis media     Chronic renal disease, stage III [facility]) [[id number]] 6/16/2022    Conductive hearing loss     Deviated nasal septum     Dysfunction of both eustachian tubes     Elevated PSA 5/22/2017    External hemorrhoids     Hearing loss     Hyperlipidemia     Hypertension     Mixed hearing loss, bilateral     Obesity     PAF (paroxysmal atrial fibrillation) ([address]     Perforation of left tympanic membrane     Right chronic serous otitis media     SNHL (sensorineural hearing loss)        Past Surgical History:   Procedure Laterality Date    COLONOSCOPY N/A 11/18/2021    COLONOSCOPY/ 32 performed by Keri Hope MD at [facility] ENDOSCOPY COLONOSCOPY  2014    Hyperplastic Polyps, diverticulosis - Repeat in 7 years    COLONOSCOPY-HIGH RISK   2021         MYRINGOTOMY Bilateral 2016    with U-tube placement       Family History   Problem Relation Age of Onset    Other Brother         COLITIS       Social History     Socioeconomic History    Marital status:      Spouse name: Not on file    Number of children: Not on file    Years of education: Not on file    Highest education level: Not on file   Occupational History    Not on file   Tobacco Use    Smoking status: Former     Types: Cigarettes     Quit date: 1980     Years since quittin.9    Smokeless tobacco: Never   Substance and Sexual Activity    Alcohol use: Yes     Alcohol/week: 7.0 standard drinks    Drug use: No    Sexual activity: Not on file   Other Topics Concern    Not on file   Social History Narrative    Not on file     Social Determinants of Health     Financial Resource Strain: Not on file   Food Insecurity: Not on file   Transportation Needs: Not on file   Physical Activity: Not on file   Stress: Not on file   Social Connections: Not on file   Intimate Partner Violence: Not on file   Housing Stability: Not on file       Current Outpatient Medications   Medication Sig Dispense Refill    pantoprazole (PROTONIX) 40 MG tablet Take 40 mg by mouth in the morning. atorvastatin (LIPITOR) 10 MG tablet Take 10 mg by mouth daily      carvedilol (COREG) 25 MG tablet Take 25 mg by mouth 2 times daily (with meals)      diclofenac sodium (VOLTAREN) 1 % GEL Apply topically 4 times daily as needed      lisinopril (PRINIVIL;ZESTRIL) 20 MG tablet Take 20 mg by mouth 2 times daily      rivaroxaban (XARELTO) 20 MG TABS tablet Take 20 mg by mouth Daily with supper      spironolactone (ALDACTONE) 25 MG tablet Take 25 mg by mouth daily      tamsulosin (FLOMAX) 0.4 MG capsule TAKE 1 CAPSULE BY MOUTH EVERY DAY       No current facility-administered medications for this visit. Allergies as of 11/17/2022    (No Known Allergies)       Review of Systems   Constitutional:  Negative for activity change and appetite change. HENT:  Negative for drooling and voice change. Eyes:  Negative for pain. Respiratory:  Negative for choking and stridor. Gastrointestinal:  Negative for rectal pain. Endocrine: Negative for polydipsia and polyphagia. Genitourinary:  Negative for enuresis and penile pain. Musculoskeletal:  Negative for gait problem and neck stiffness. Skin:  Negative for pallor. Allergic/Immunologic: Negative for immunocompromised state. Neurological:  Negative for facial asymmetry and speech difficulty. Hematological:  Does not bruise/bleed easily. Psychiatric/Behavioral:  Negative for self-injury. The patient is not hyperactive. Patient Care Team:  Deep Mendez MD as PCP - General  Deep Mendez MD as PCP - [facility]    Objective:    /80 (Site: Left Upper Arm, Position: Sitting)   Pulse 73   Resp 18   Ht 5' 11\" (1.803 m)   Wt 235 lb 6.4 oz (106.8 kg)   SpO2 98%   BMI 32.83 kg/m²     Physical Exam  Vitals reviewed. Constitutional:       General: He is not in acute distress. Appearance: Normal appearance. He is not toxic-appearing. HENT:      Head: Normocephalic and atraumatic. Right Ear: Tympanic membrane, ear canal and external ear normal.      Left Ear: Tympanic membrane, ear canal and external ear normal.      Nose: Nose normal.      Mouth/Throat:      Mouth: Mucous membranes are moist.      Pharynx: Oropharynx is clear. Eyes:      General: No scleral icterus. Extraocular Movements: Extraocular movements intact. Conjunctiva/sclera: Conjunctivae normal.      Pupils: Pupils are equal, round, and reactive to light. Cardiovascular:      Rate and Rhythm: Normal rate and regular rhythm. Pulses: Normal pulses. Heart sounds: Normal heart sounds.    Pulmonary:      Breath sounds: Normal breath sounds. Abdominal:      General: Abdomen is flat. Bowel sounds are normal.      Palpations: Abdomen is soft. There is no mass. Tenderness: There is no guarding or rebound. Musculoskeletal:         General: Normal range of motion. Cervical back: Normal range of motion and neck supple. Skin:     General: Skin is warm and dry. Coloration: Skin is not jaundiced. Neurological:      Mental Status: He is alert and oriented to person, place, and time. Mental status is at baseline. Psychiatric:         Behavior: Behavior normal.         Thought Content:  Thought content normal.            Orders Only on 11/09/2022   Component Date Value Ref Range Status    Folate 11/09/2022 3.8  3.1 - 17.5 ng/mL Final    WBC 11/09/2022 6.0  4.3 - 11.1 K/uL Final    RBC 11/09/2022 3.45 (A)  4.23 - 5.6 M/uL Final    Hemoglobin 11/09/2022 11.4 (A)  13.6 - 17.2 g/dL Final    Hematocrit 11/09/2022 35.6 (A)  41.1 - 50.3 % Final    MCV 11/09/2022 103.2 (A)  82 - 102 FL Final    MCH 11/09/2022 33.0 (A)  26.1 - 32.9 PG Final    MCHC 11/09/2022 32.0  31.4 - 35.0 g/dL Final    RDW 11/09/2022 13.6  11.9 - 14.6 % Final    Platelets 75/47/5442 162  150 - 450 K/uL Final    MPV 11/09/2022 11.8  9.4 - 12.3 FL Final    nRBC 11/09/2022 0.00  0.0 - 0.2 K/uL Final    **Note: Absolute NRBC parameter is now reported with Hemogram**    Differential Type 11/09/2022 AUTOMATED    Final    Seg Neutrophils 11/09/2022 65  43 - 78 % Final    Lymphocytes 11/09/2022 25  13 - 44 % Final    Monocytes 11/09/2022 8  4.0 - 12.0 % Final    Eosinophils % 11/09/2022 1  0.5 - 7.8 % Final    Basophils 11/09/2022 1  0.0 - 2.0 % Final    Immature Granulocytes 11/09/2022 0  0.0 - 5.0 % Final    Segs Absolute 11/09/2022 3.9  1.7 - 8.2 K/UL Final    Absolute Lymph # 11/09/2022 1.5  0.5 - 4.6 K/UL Final    Absolute Mono # 11/09/2022 0.5  0.1 - 1.3 K/UL Final    Absolute Eos # 11/09/2022 0.1  0.0 - 0.8 K/UL Final    Basophils Absolute 11/09/2022 0.0  0.0 - 0.2 K/UL Final    Absolute Immature Granulocyte 11/09/2022 0.0  0.0 - 0.5 K/UL Final    Sodium 11/09/2022 135  133 - 143 mmol/L Final    Potassium 11/09/2022 5.2 (A)  3.5 - 5.1 mmol/L Final    Chloride 11/09/2022 106  101 - 110 mmol/L Final    CO2 11/09/2022 22  21 - 32 mmol/L Final    Anion Gap 11/09/2022 7  2 - 11 mmol/L Final    Glucose 11/09/2022 110 (A)  65 - 100 mg/dL Final    BUN 11/09/2022 36 (A)  8 - 23 MG/DL Final    Creatinine 11/09/2022 1.50  0.8 - 1.5 MG/DL Final    Est, Glom Filt Rate 11/09/2022 49 (A)  >60 ml/min/1.73m2 Final    Comment:   Pediatric calculator link: [employer].at. org/professionals/kdoqi/gfr_calculatorped    Effective Oct 3, 2022    These results are not intended for use in patients <25years of age. eGFR results are calculated without a race factor using  the 2021 CKD-EPI equation. Careful clinical correlation is recommended, particularly when comparing to results calculated using previous equations. The CKD-EPI equation is less accurate in patients with extremes of muscle mass, extra-renal metabolism of creatinine, excessive creatine ingestion, or following therapy that affects renal tubular secretion.       Calcium 11/09/2022 9.3  8.3 - 10.4 MG/DL Final    Total Bilirubin 11/09/2022 0.5  0.2 - 1.1 MG/DL Final    ALT 11/09/2022 30  12 - 65 U/L Final    AST 11/09/2022 12 (A)  15 - 37 U/L Final    Alk Phosphatase 11/09/2022 57  50 - 136 U/L Final    Total Protein 11/09/2022 6.8  6.3 - 8.2 g/dL Final    Albumin 11/09/2022 4.0  3.2 - 4.6 g/dL Final    Globulin 11/09/2022 2.8  2.8 - 4.5 g/dL Final    Albumin/Globulin Ratio 11/09/2022 1.4  0.4 - 1.6   Final    Cholesterol, Total 11/09/2022 121  <200 MG/DL Final    Comment: Borderline High: 200-239 mg/dL  High: Greater than or equal to 240 mg/dL      Triglycerides 11/09/2022 127  35 - 150 MG/DL Final    Comment: Borderline High: 150-199 mg/dL, High: 200-499 mg/dL  Very High: Greater than or equal to 500 mg/dL      HDL 11/09/2022 41  40 - 60 MG/DL Final    LDL Calculated 11/09/2022 54.6  <100 MG/DL Final    Comment: Near Optimal: 100-129 mg/dL  Borderline High: 130-159, High: 160-189 mg/dL  Very High: Greater than or equal to 190 mg/dL      VLDL Cholesterol Calculated 11/09/2022 25.4 (A)  6.0 - 23.0 MG/DL Final    Chol/HDL Ratio 11/09/2022 3.0    Final    Ferritin 11/09/2022 249  8 - 388 NG/ML Final    Vitamin B-12 11/09/2022 270  193 - 986 pg/mL Final         Assessent & Plan:        1. Persistent atrial fibrillation ([facility] [address]  The following orders have not been finalized:  -     carvedilol (COREG) 25 MG tablet  2. Hypercholesterolemia  Overview:  11/9/22 total 121 HDL 46 LDL 55  on atorvastatin 10 mg daily. Reviewed the most recent lipid panel with the patient, and interpreted the results within the context of the patient's personal cardiovascular risk factors. Discussed/reinforced a low-cholesterol diet. The patient will continue current intensity statin therapy. The following orders have not been finalized:  Orders:  -     atorvastatin (LIPITOR) 10 MG tablet  3. Stage 3a chronic kidney disease ([facility] [address]  Overview:  11/9/22 creatinine 1.50, eGFR 49  3/25/22 creatinine 1.40, eGFR 53    Labs were reviewed and discussed with patient. The patient was advised to avoid NSAIDs and other nephrotoxins. Will dose adjust medications as appropriate. Will monitor labs over time. 4. Macrocytic anemia  Overview:  11/9/22 hgb 11.4, mcv 103.2, remainder CBC normal.  B12 270, folate 3.8, ferritin 249. Labs were reviewed and discussed with patient. He has borderline / low normal B12 and folate. Advised to avoid alcohol. Start empiric B12 1000 mcg daily + folate 1 mg daily. Repeat labs. The following orders have not been finalized:  Orders:  -     folic acid (FOLVITE) 1 MG tablet  -     cyanocobalamin ([facility] VITAMIN B12) 1000 MCG tablet  5.  Class 1 obesity due to excess calories with body mass index (BMI) of 33.0 to 33.9 in adult, unspecified whether serious comorbidity present  Overview:  11/11/19 TSH 1.430. Reviewed the patient's BMI. Discussed the need to lose weight in order to avoid many preventable illnesses. Discussed diet, exercise, and weight loss strategies. 6. Need for hepatitis C screening test  Overview:      7. BPH associated with nocturia  Overview:  Symptoms improved with Flomax. Followed by urology Dr. Jenn Faulkner. 8. Encounter for immunization  Overview:  Reviewed / discussed vaccinations. Offered him Shingrix which he   declines. Offered him Tdap which he declines. 9. PAF (paroxysmal atrial fibrillation) ([facility])  Overview:  Continue carvedilol and Xarelto. Follow up with cardiology as scheduled. 10. Polyp of colon, unspecified part of colon, unspecified type  Overview:  11/18/21 colonoscopy - normal.  Repeat 5 yrs.    5/2014 colonoscopy - 3 adenomatous polyps removed. 11. Hypertension, essential  Overview:  Well controlled on current medications (carvedilol 25 mg po BID, lisinopril 20 mg daily, aldactone 25 mg daily). The patient will continue the current treatment. The following orders have not been finalized:  Orders:  -     spironolactone (ALDACTONE) 25 MG tablet  12. External hemorrhoid, bleeding  Overview:  Improved. Will monitor. The patient and/or patient representative voiced understanding and agreement with the current diagnoses, recommendations, and possible side effects. Return in about 3 months (around 2/17/2023) for annual wellness, review labs.

## 2023-01-18 DIAGNOSIS — N40.1 BENIGN PROSTATIC HYPERPLASIA WITH LOWER URINARY TRACT SYMPTOMS: ICD-10-CM

## 2023-01-18 RX ORDER — TAMSULOSIN HYDROCHLORIDE 0.4 MG/1
CAPSULE ORAL
Qty: 90 CAPSULE | Refills: 3 | Status: SHIPPED | OUTPATIENT
Start: 2023-01-18

## 2023-02-16 ENCOUNTER — PATIENT MESSAGE (OUTPATIENT)
Dept: CARDIOLOGY CLINIC | Age: 73
End: 2023-02-16

## 2023-02-16 NOTE — TELEPHONE ENCOUNTER
Requested Prescriptions     Pending Prescriptions Disp Refills    rivaroxaban (XARELTO) 20 MG TABS tablet 30 tablet 0     Sig: Take 1 tablet by mouth Daily with supper

## 2023-02-16 NOTE — TELEPHONE ENCOUNTER
From: Duane Harari  To: Dr. Rupesh Church: 2/16/2023 8:50 AM EST  Subject: Xarelto refill    ; my Xarelto prescription is nearly out (3 tablets to go). The [facility] pharmacy said you need to write a new prescription. I am not scheduled to see you until the end of March, can you please send a new prescription to my pharmacy even if it's only for a one month supply? My pharmacy is [facility].    Regards  Maik Route

## 2023-02-17 ENCOUNTER — OFFICE VISIT (OUTPATIENT)
Dept: INTERNAL MEDICINE CLINIC | Facility: CLINIC | Age: 73
End: 2023-02-17

## 2023-02-17 VITALS
HEART RATE: 58 BPM | WEIGHT: 238 LBS | SYSTOLIC BLOOD PRESSURE: 120 MMHG | BODY MASS INDEX: 33.32 KG/M2 | HEIGHT: 71 IN | DIASTOLIC BLOOD PRESSURE: 68 MMHG

## 2023-02-17 DIAGNOSIS — N18.31 STAGE 3A CHRONIC KIDNEY DISEASE (HCC): ICD-10-CM

## 2023-02-17 DIAGNOSIS — R73.01 IMPAIRED FASTING GLUCOSE: ICD-10-CM

## 2023-02-17 DIAGNOSIS — Z00.00 MEDICARE ANNUAL WELLNESS VISIT, SUBSEQUENT: Primary | ICD-10-CM

## 2023-02-17 DIAGNOSIS — Z23 ENCOUNTER FOR IMMUNIZATION: ICD-10-CM

## 2023-02-17 DIAGNOSIS — I10 HYPERTENSION, ESSENTIAL: ICD-10-CM

## 2023-02-17 DIAGNOSIS — Z87.891 HISTORY OF TOBACCO ABUSE: ICD-10-CM

## 2023-02-17 DIAGNOSIS — E78.00 HYPERCHOLESTEROLEMIA: ICD-10-CM

## 2023-02-17 DIAGNOSIS — K63.5 POLYP OF COLON, UNSPECIFIED PART OF COLON, UNSPECIFIED TYPE: ICD-10-CM

## 2023-02-17 DIAGNOSIS — R35.1 BPH ASSOCIATED WITH NOCTURIA: ICD-10-CM

## 2023-02-17 DIAGNOSIS — I48.0 PAF (PAROXYSMAL ATRIAL FIBRILLATION) (HCC): ICD-10-CM

## 2023-02-17 DIAGNOSIS — N40.1 BPH ASSOCIATED WITH NOCTURIA: ICD-10-CM

## 2023-02-17 DIAGNOSIS — I71.21 ANEURYSM OF ASCENDING AORTA WITHOUT RUPTURE: ICD-10-CM

## 2023-02-17 DIAGNOSIS — Z13.6 SCREENING FOR AAA (ABDOMINAL AORTIC ANEURYSM): ICD-10-CM

## 2023-02-17 DIAGNOSIS — R97.20 ELEVATED PSA: ICD-10-CM

## 2023-02-17 DIAGNOSIS — D53.9 MACROCYTIC ANEMIA: ICD-10-CM

## 2023-02-17 PROBLEM — K64.4 EXTERNAL HEMORRHOID, BLEEDING: Status: RESOLVED | Noted: 2022-03-16 | Resolved: 2023-02-17

## 2023-02-17 SDOH — ECONOMIC STABILITY: HOUSING INSECURITY
IN THE LAST 12 MONTHS, WAS THERE A TIME WHEN YOU DID NOT HAVE A STEADY PLACE TO SLEEP OR SLEPT IN A SHELTER (INCLUDING NOW)?: NO

## 2023-02-17 SDOH — ECONOMIC STABILITY: INCOME INSECURITY: HOW HARD IS IT FOR YOU TO PAY FOR THE VERY BASICS LIKE FOOD, HOUSING, MEDICAL CARE, AND HEATING?: NOT VERY HARD

## 2023-02-17 SDOH — ECONOMIC STABILITY: FOOD INSECURITY: WITHIN THE PAST 12 MONTHS, YOU WORRIED THAT YOUR FOOD WOULD RUN OUT BEFORE YOU GOT MONEY TO BUY MORE.: NEVER TRUE

## 2023-02-17 SDOH — ECONOMIC STABILITY: FOOD INSECURITY: WITHIN THE PAST 12 MONTHS, THE FOOD YOU BOUGHT JUST DIDN'T LAST AND YOU DIDN'T HAVE MONEY TO GET MORE.: PATIENT DECLINED

## 2023-02-17 ASSESSMENT — ENCOUNTER SYMPTOMS
STRIDOR: 0
VOICE CHANGE: 0
CHOKING: 0
RECTAL PAIN: 0
EYE PAIN: 0

## 2023-02-17 ASSESSMENT — LIFESTYLE VARIABLES
HOW MANY STANDARD DRINKS CONTAINING ALCOHOL DO YOU HAVE ON A TYPICAL DAY: 1 OR 2
HOW OFTEN DO YOU HAVE A DRINK CONTAINING ALCOHOL: 2-3 TIMES A WEEK

## 2023-02-17 ASSESSMENT — PATIENT HEALTH QUESTIONNAIRE - PHQ9
SUM OF ALL RESPONSES TO PHQ QUESTIONS 1-9: 0
SUM OF ALL RESPONSES TO PHQ QUESTIONS 1-9: 0
2. FEELING DOWN, DEPRESSED OR HOPELESS: 0
SUM OF ALL RESPONSES TO PHQ QUESTIONS 1-9: 0
SUM OF ALL RESPONSES TO PHQ QUESTIONS 1-9: 0
1. LITTLE INTEREST OR PLEASURE IN DOING THINGS: 0
SUM OF ALL RESPONSES TO PHQ9 QUESTIONS 1 & 2: 0

## 2023-02-17 NOTE — PROGRESS NOTES
FOLLOWUP VISIT    Subjective:    Mr. Ary Martinez is a 74 y.o., male,   Chief Complaint   Patient presents with    Medicare AWV    3 Month Follow-Up       HPI:    Patient presents today for follow up of two or more chronic medical problems and review of labs. The patient is also here for the annual Medicare wellness visit. The patient has benign prostatic hyperplasia. The patient has been tolerating the BPH medication(s) without side effect and is satisfied with his current voiding pattern. The patient has hypertension. The patient has been on an attempted low sodium diet and has been trying to exercise and maintain a healthy weight. The patient reports good compliance with the blood pressure medications. The patient has hyperlipidemia. The patient has been following a low cholesterol diet and denies any myalgias or weakness on current lipid lowering therapy. He has cut back on his wine but still eating more cookies and sugars than ideal.  Discussed his new diagnosis of prediabetes today. Interval history  and review of symptoms otherwise unchanged.        The following portions of the patient's history were reviewed and updated as appropriate:      Past Medical History:   Diagnosis Date    BPH associated with nocturia 10/10/2011    Calculus of kidney     Chronic otitis media     Chronic renal disease, stage III [facility]) [[id number]] 6/16/2022    Conductive hearing loss     Deviated nasal septum     Dysfunction of both eustachian tubes     Elevated PSA 5/22/2017    External hemorrhoids     Hearing loss     Hyperlipidemia     Hypertension     Mixed hearing loss, bilateral     Obesity     PAF (paroxysmal atrial fibrillation) (HCC)     Perforation of left tympanic membrane     Right chronic serous otitis media     SNHL (sensorineural hearing loss)        Past Surgical History:   Procedure Laterality Date    COLONOSCOPY N/A 11/18/2021    COLONOSCOPY/ 32 performed by Margaux Adams MD at [facility] ENDOSCOPY    COLONOSCOPY  2014    Hyperplastic Polyps, diverticulosis - Repeat in 7 years    COLONOSCOPY-HIGH RISK   2021         MYRINGOTOMY Bilateral 2016    with U-tube placement       Family History   Problem Relation Age of Onset    Other Brother         COLITIS       Social History     Socioeconomic History    Marital status:      Spouse name: Not on file    Number of children: Not on file    Years of education: Not on file    Highest education level: Not on file   Occupational History    Not on file   Tobacco Use    Smoking status: Former     Types: Cigarettes     Quit date: 1980     Years since quittin.1    Smokeless tobacco: Never   Substance and Sexual Activity    Alcohol use:  Yes     Alcohol/week: 7.0 standard drinks    Drug use: No    Sexual activity: Not on file   Other Topics Concern    Not on file   Social History Narrative    Not on file     Social Determinants of Health     Financial Resource Strain: Not on file   Food Insecurity: Not on file   Transportation Needs: Not on file   Physical Activity: Insufficiently Active    Days of Exercise per Week: 3 days    Minutes of Exercise per Session: 30 min   Stress: Not on file   Social Connections: Not on file   Intimate Partner Violence: Not on file   Housing Stability: Not on file       Current Outpatient Medications   Medication Sig Dispense Refill    rivaroxaban (XARELTO) 20 MG TABS tablet Take 1 tablet by mouth Daily with supper 30 tablet 0    tamsulosin (FLOMAX) 0.4 MG capsule TAKE 1 CAPSULE BY MOUTH EVERY DAY 90 capsule 3    carvedilol (COREG) 25 MG tablet Take 1 tablet by mouth 2 times daily (with meals) 180 tablet 2    spironolactone (ALDACTONE) 25 MG tablet Take 1 tablet by mouth daily 90 tablet 2    atorvastatin (LIPITOR) 10 MG tablet Take 1 tablet by mouth daily 90 tablet 2    cyanocobalamin ([facility] VITAMIN B12) 1000 MCG tablet Take 1 tablet by mouth daily 90 tablet 3    folic acid (FOLVITE) 1 MG tablet Take 1 tablet by mouth daily 90 tablet 1    pantoprazole (PROTONIX) 40 MG tablet Take 40 mg by mouth in the morning. diclofenac sodium (VOLTAREN) 1 % GEL Apply topically 4 times daily as needed      lisinopril (PRINIVIL;ZESTRIL) 20 MG tablet Take 20 mg by mouth 2 times daily       No current facility-administered medications for this visit. Allergies as of 02/17/2023    (No Known Allergies)       Review of Systems   Constitutional:  Negative for activity change and appetite change. HENT:  Negative for drooling and voice change. Eyes:  Negative for pain. Respiratory:  Negative for choking and stridor. Gastrointestinal:  Negative for rectal pain. Endocrine: Negative for polydipsia and polyphagia. Genitourinary:  Negative for enuresis and penile pain. Musculoskeletal:  Negative for gait problem and neck stiffness. Skin:  Negative for pallor. Allergic/Immunologic: Negative for immunocompromised state. Neurological:  Negative for facial asymmetry and speech difficulty. Hematological:  Does not bruise/bleed easily. Psychiatric/Behavioral:  Negative for self-injury. The patient is not hyperactive. Patient Care Team:  Nestor Corcoran MD as PCP - General  Nestorjoshua Corcoran MD as PCP - Empaneled Provider    Objective:    /68 (Site: Left Upper Arm, Position: Sitting)   Pulse 58   Ht 5' 11\" (1.803 m)   Wt 238 lb (108 kg)   BMI 33.19 kg/m²     Physical Exam  Vitals reviewed. Constitutional:       General: He is not in acute distress. Appearance: Normal appearance. He is not toxic-appearing. HENT:      Head: Normocephalic and atraumatic. Right Ear: Tympanic membrane, ear canal and external ear normal.      Left Ear: Tympanic membrane, ear canal and external ear normal.      Nose: Nose normal.      Mouth/Throat:      Mouth: Mucous membranes are moist.      Pharynx: Oropharynx is clear. Eyes:      General: No scleral icterus.      Extraocular Movements: Extraocular movements intact. Conjunctiva/sclera: Conjunctivae normal.      Pupils: Pupils are equal, round, and reactive to light. Cardiovascular:      Rate and Rhythm: Normal rate and regular rhythm. Pulses: Normal pulses. Heart sounds: Normal heart sounds. Pulmonary:      Breath sounds: Normal breath sounds. Abdominal:      General: Abdomen is flat. Bowel sounds are normal.      Palpations: Abdomen is soft. There is no mass. Tenderness: There is no guarding or rebound. Musculoskeletal:         General: Normal range of motion. Cervical back: Normal range of motion and neck supple. Skin:     General: Skin is warm and dry. Coloration: Skin is not jaundiced. Neurological:      Mental Status: He is alert and oriented to person, place, and time. Mental status is at baseline. Psychiatric:         Behavior: Behavior normal.         Thought Content: Thought content normal.            Orders Only on 02/10/2023   Component Date Value Ref Range Status    Sodium 02/10/2023 137  133 - 143 mmol/L Final    Potassium 02/10/2023 4.8  3.5 - 5.1 mmol/L Final    Chloride 02/10/2023 108  101 - 110 mmol/L Final    CO2 02/10/2023 23  21 - 32 mmol/L Final    Anion Gap 02/10/2023 6  2 - 11 mmol/L Final    Glucose 02/10/2023 116 (A)  65 - 100 mg/dL Final    BUN 02/10/2023 24 (A)  8 - 23 MG/DL Final    Creatinine 02/10/2023 1.60 (A)  0.8 - 1.5 MG/DL Final    Est, Glom Filt Rate 02/10/2023 45 (A)  >60 ml/min/1.73m2 Final    Comment:   Pediatric calculator link: [employer].at. org/professionals/kdoqi/gfr_calculatorped    These results are not intended for use in patients <25years of age. eGFR results are calculated without a race factor using  the 2021 CKD-EPI equation. Careful clinical correlation is recommended, particularly when comparing to results calculated using previous equations.   The CKD-EPI equation is less accurate in patients with extremes of muscle mass, extra-renal metabolism of creatinine, excessive creatine ingestion, or following therapy that affects renal tubular secretion. Calcium 02/10/2023 8.4  8.3 - 10.4 MG/DL Final    WBC 02/10/2023 5.5  4.3 - 11.1 K/uL Final    RBC 02/10/2023 3.50 (A)  4.23 - 5.6 M/uL Final    Hemoglobin 02/10/2023 11.4 (A)  13.6 - 17.2 g/dL Final    Hematocrit 02/10/2023 35.3 (A)  41.1 - 50.3 % Final    MCV 02/10/2023 100.9  82 - 102 FL Final    MCH 02/10/2023 32.6  26.1 - 32.9 PG Final    MCHC 02/10/2023 32.3  31.4 - 35.0 g/dL Final    RDW 02/10/2023 12.5  11.9 - 14.6 % Final    Platelets 39/84/1799 169  150 - 450 K/uL Final    MPV 02/10/2023 12.0  9.4 - 12.3 FL Final    nRBC 02/10/2023 0.00  0.0 - 0.2 K/uL Final    **Note: Absolute NRBC parameter is now reported with Hemogram**    Differential Type 02/10/2023 AUTOMATED    Final    Seg Neutrophils 02/10/2023 60  43 - 78 % Final    Lymphocytes 02/10/2023 28  13 - 44 % Final    Monocytes 02/10/2023 9  4.0 - 12.0 % Final    Eosinophils % 02/10/2023 2  0.5 - 7.8 % Final    Basophils 02/10/2023 1  0.0 - 2.0 % Final    Immature Granulocytes 02/10/2023 0  0.0 - 5.0 % Final    Segs Absolute 02/10/2023 3.3  1.7 - 8.2 K/UL Final    Absolute Lymph # 02/10/2023 1.6  0.5 - 4.6 K/UL Final    Absolute Mono # 02/10/2023 0.5  0.1 - 1.3 K/UL Final    Absolute Eos # 02/10/2023 0.1  0.0 - 0.8 K/UL Final    Basophils Absolute 02/10/2023 0.1  0.0 - 0.2 K/UL Final    Absolute Immature Granulocyte 02/10/2023 0.0  0.0 - 0.5 K/UL Final    Folate 02/10/2023 15.0  3.1 - 17.5 ng/mL Final    Vitamin B-12 02/10/2023 433  193 - 986 pg/mL Final         Assessent & Plan:        1. Medicare annual wellness visit, subsequent  Overview:  Performed 2/17/23    2. BPH associated with nocturia  Overview:  Symptoms improved with Flomax. Followed by urology Dr. Elda Bruno. 3. Polyp of colon, unspecified part of colon, unspecified type  Overview:  11/18/21 colonoscopy - normal.  Repeat 5 yrs.    5/2014 colonoscopy - 3 adenomatous polyps removed.         4. Elevated PSA  Overview: Followed by urology Dr. Grand rogers. 1/15/18 prostate biopsy negative. 5. Encounter for immunization  Overview:  Reviewed / discussed vaccinations. Offered him Shingrix which he declines. Offered him Tdap which he declines. 6. Hypercholesterolemia  Overview:  11/9/22 total 121 HDL 46 LDL 55  on atorvastatin 10 mg daily. Reviewed the most recent lipid panel with the patient, and interpreted the results within the context of the patient's personal cardiovascular risk factors. Discussed/reinforced a low-cholesterol diet. The patient will continue current intensity statin therapy. Orders:  -     Lipid Panel; Future  7. Hypertension, essential  Overview:  Well controlled on current medications (carvedilol 25 mg po BID, lisinopril 20 mg daily, aldactone 25 mg daily). The patient will continue the current treatment. Orders:  -     Comprehensive Metabolic Panel; Future  8. Macrocytic anemia  Overview:  2/10/23 hgb 11.4, mcv 100.9, remainder CBC normal.  B12 433, folate 15.    11/9/22 hgb 11.4, mcv 103.2, remainder CBC normal.  B12 270, folate 3.8, ferritin 249. Labs were reviewed and discussed with patient. He had macrocytic anemia with borderline / low normal B12 and folate. His macrocytosis has improved with empiric B12 and folate therapy. Advised to avoid alcohol. Follow labs. Orders:  -     CBC with Auto Differential; Future  -     Vitamin B12; Future  -     Folate; Future  9. PAF (paroxysmal atrial fibrillation) (HCC)  Overview:  Continue carvedilol and Xarelto. Follow up with cardiology as scheduled. 10. Stage 3a chronic kidney disease ([facility] [address]  Overview:  2/10/23 creatinine 1.60, eGFR 45  11/9/22 creatinine 1.50, eGFR 49  3/25/22 creatinine 1.40, eGFR 53    Labs were reviewed and discussed with patient. The patient was advised to avoid NSAIDs and other nephrotoxins. Will dose adjust medications as appropriate. Will monitor labs over time. 11. Aneurysm of ascending aorta without rupture  Overview:  6/14/22 echo - normal aorta / sinus of valsalva  3/8/21 CTA chest - 4.4 cm maximal diameter thoracic ascending aorta. 2/18/21 echo - mild cLVH / diastolic dysfunction. EF 56%,  Severe LAE. Dilated ascending aorta. Followed by cardiology Dr. Inessa Beckham. Given his slowly progressive CKD 3a / borderline 3b ideally would want to avoid unnecessary dye loads. 2022 echocardiogram negative. Orders:  -     Vascular AAA screening; Future  12. Screening for AAA (abdominal aortic aneurysm)  Overview:  Ordered given past tobacco history. Orders:  -     Vascular AAA screening; Future  13. History of tobacco abuse  -     Vascular AAA screening; Future  14. Impaired fasting glucose  Overview:  2/10/23     This was a new diagnosis. The patient was educated regarding \"prediabetes\" and the risk for progression over time to diabetes mellitus. We discussed strategies to prevent progression including dietary changes, exercise, and weight loss. Orders:  -     Hemoglobin A1C; Future        The patient and/or patient representative voiced understanding and agreement with the current diagnoses, recommendations, and possible side effects. Return in about 6 months (around 8/17/2023) for follow up of chronic medical problems, review labs. Medicare Annual Wellness Visit    Arcadio Hinojosa is here for Medicare AWV and 3 Month Follow-Up    Assessment & Plan   Medicare annual wellness visit, subsequent  BPH associated with nocturia  Polyp of colon, unspecified part of colon, unspecified type  Elevated PSA  Encounter for immunization  Hypercholesterolemia  -     Lipid Panel; Future  Hypertension, essential  -     Comprehensive Metabolic Panel; Future  Macrocytic anemia  -     CBC with Auto Differential; Future  -     Vitamin B12; Future  -     Folate;  Future  PAF (paroxysmal atrial fibrillation) (HCC)  Stage 3a chronic kidney disease (HCC)  Aneurysm of ascending aorta without rupture  -     Vascular AAA screening; Future  Screening for AAA (abdominal aortic aneurysm)  -     Vascular AAA screening; Future  History of tobacco abuse  -     Vascular AAA screening; Future  Impaired fasting glucose  -     Hemoglobin A1C; Future    Recommendations for Preventive Services Due: see orders and patient instructions/AVS.  Recommended screening schedule for the next 5-10 years is provided to the patient in written form: see Patient Instructions/AVS.     Return in about 6 months (around 8/17/2023) for follow up of chronic medical problems, review labs. Subjective     Patient's complete Health Risk Assessment and screening values have been reviewed and are found in Flowsheets. The following problems were reviewed today and where indicated follow up appointments were made and/or referrals ordered. Positive Risk Factor Screenings with Interventions:                 Weight and Activity:  Physical Activity: Insufficiently Active    Days of Exercise per Week: 3 days    Minutes of Exercise per Session: 30 min     On average, how many days per week do you engage in moderate to strenuous exercise (like a brisk walk)?: 3 days  Have you lost any weight without trying in the past 3 months?: No  Body mass index: (!) 33.19  Obesity Interventions:  See AVS for additional education material           Safety:  Do you have either shower bars, grab bars, non-slip mats or non-slip surfaces in your shower or bathtub?: (!) No  Interventions:  See AVS for additional education material             Objective   Vitals:    02/17/23 0913   BP: 120/68   Site: Left Upper Arm   Position: Sitting   Pulse: 58   Weight: 238 lb (108 kg)   Height: 5' 11\" (1.803 m)      Body mass index is 33.19 kg/m². No Known Allergies  Prior to Visit Medications    Medication Sig Taking?  Authorizing Provider   rivaroxaban (XARELTO) 20 MG TABS tablet Take 1 tablet by mouth Daily with supper Yes Natali Larson Makenzie Hall MD   tamsulosin (FLOMAX) 0.4 MG capsule TAKE 1 CAPSULE BY MOUTH EVERY DAY Yes Danna oSriano MD   carvedilol (COREG) 25 MG tablet Take 1 tablet by mouth 2 times daily (with meals) Yes Danna Soriano MD   spironolactone (ALDACTONE) 25 MG tablet Take 1 tablet by mouth daily Yes Danna Soriano MD   atorvastatin (LIPITOR) 10 MG tablet Take 1 tablet by mouth daily Yes Danna Soriano MD   cyanocobalamin ([facility] VITAMIN B12) 1000 MCG tablet Take 1 tablet by mouth daily Yes Danna Soriano MD   folic acid (FOLVITE) 1 MG tablet Take 1 tablet by mouth daily Yes Danna Soriano MD   pantoprazole (PROTONIX) 40 MG tablet Take 40 mg by mouth in the morning.  Yes Historical Provider, MD   diclofenac sodium (VOLTAREN) 1 % GEL Apply topically 4 times daily as needed Yes Ar Automatic Reconciliation   lisinopril (PRINIVIL;ZESTRIL) 20 MG tablet Take 20 mg by mouth 2 times daily Yes Ar Automatic Reconciliation       CareTeam (Including outside providers/suppliers regularly involved in providing care):   Patient Care Team:  Danna Soriano MD as PCP - General  Danna Soriano MD as PCP - Empaneled Provider     Reviewed and updated this visit:  Tobacco  Med Hx  Surg Hx  Soc Hx  Fam Hx             Danna Soriano MD

## 2023-03-19 PROBLEM — Z13.6 SCREENING FOR AAA (ABDOMINAL AORTIC ANEURYSM): Status: RESOLVED | Noted: 2023-02-17 | Resolved: 2023-03-19

## 2023-03-19 PROBLEM — Z00.00 MEDICARE ANNUAL WELLNESS VISIT, SUBSEQUENT: Status: RESOLVED | Noted: 2021-07-27 | Resolved: 2023-03-19

## 2023-03-23 ENCOUNTER — HOSPITAL ENCOUNTER (OUTPATIENT)
Dept: ULTRASOUND IMAGING | Age: 73
Discharge: HOME | End: 2023-03-23
Payer: MEDICARE

## 2023-03-23 DIAGNOSIS — Z87.891 HISTORY OF TOBACCO ABUSE: ICD-10-CM

## 2023-03-23 DIAGNOSIS — Z13.6 SCREENING FOR AAA (ABDOMINAL AORTIC ANEURYSM): ICD-10-CM

## 2023-03-23 DIAGNOSIS — I71.21 ANEURYSM OF ASCENDING AORTA WITHOUT RUPTURE (HCC): ICD-10-CM

## 2023-03-23 PROCEDURE — 76706 US ABDL AORTA SCREEN AAA: CPT

## 2023-03-24 ENCOUNTER — TELEPHONE (OUTPATIENT)
Dept: INTERNAL MEDICINE CLINIC | Facility: CLINIC | Age: 73
End: 2023-03-24

## 2023-03-24 NOTE — TELEPHONE ENCOUNTER
----- Message from Corina Garcia MD sent at 3/24/2023  9:31 AM EDT -----  Call patient. Abdominal US showed no aneurysm.

## 2023-03-30 ENCOUNTER — OFFICE VISIT (OUTPATIENT)
Dept: CARDIOLOGY CLINIC | Age: 73
End: 2023-03-30
Payer: MEDICARE

## 2023-03-30 VITALS
WEIGHT: 240 LBS | DIASTOLIC BLOOD PRESSURE: 60 MMHG | BODY MASS INDEX: 33.6 KG/M2 | SYSTOLIC BLOOD PRESSURE: 108 MMHG | HEART RATE: 65 BPM | HEIGHT: 71 IN

## 2023-03-30 DIAGNOSIS — I10 HYPERTENSION, ESSENTIAL: ICD-10-CM

## 2023-03-30 DIAGNOSIS — I48.0 PAF (PAROXYSMAL ATRIAL FIBRILLATION) (HCC): Primary | ICD-10-CM

## 2023-03-30 DIAGNOSIS — I48.19 PERSISTENT ATRIAL FIBRILLATION (HCC): ICD-10-CM

## 2023-03-30 PROCEDURE — 93000 ELECTROCARDIOGRAM COMPLETE: CPT | Performed by: INTERNAL MEDICINE

## 2023-03-30 PROCEDURE — G8427 DOCREV CUR MEDS BY ELIG CLIN: HCPCS | Performed by: INTERNAL MEDICINE

## 2023-03-30 PROCEDURE — 3078F DIAST BP <80 MM HG: CPT | Performed by: INTERNAL MEDICINE

## 2023-03-30 PROCEDURE — G8484 FLU IMMUNIZE NO ADMIN: HCPCS | Performed by: INTERNAL MEDICINE

## 2023-03-30 PROCEDURE — 3017F COLORECTAL CA SCREEN DOC REV: CPT | Performed by: INTERNAL MEDICINE

## 2023-03-30 PROCEDURE — 1123F ACP DISCUSS/DSCN MKR DOCD: CPT | Performed by: INTERNAL MEDICINE

## 2023-03-30 PROCEDURE — 99214 OFFICE O/P EST MOD 30 MIN: CPT | Performed by: INTERNAL MEDICINE

## 2023-03-30 PROCEDURE — 3074F SYST BP LT 130 MM HG: CPT | Performed by: INTERNAL MEDICINE

## 2023-03-30 PROCEDURE — G8417 CALC BMI ABV UP PARAM F/U: HCPCS | Performed by: INTERNAL MEDICINE

## 2023-03-30 PROCEDURE — 1036F TOBACCO NON-USER: CPT | Performed by: INTERNAL MEDICINE

## 2023-03-30 RX ORDER — CETIRIZINE HYDROCHLORIDE 10 MG/1
10 TABLET ORAL DAILY
COMMUNITY

## 2023-03-30 NOTE — PROGRESS NOTES
to call our office with any issues or other concerns related to their cardiac condition(s) and/or complaint(s). No follow-ups on file. Yulisa Blas MD, MS  Cardiology/Electrophysiology  3/30/2023  1:06 PM

## 2023-05-04 RX ORDER — LISINOPRIL 20 MG/1
TABLET ORAL
Qty: 180 TABLET | Refills: 3 | Status: SHIPPED | OUTPATIENT
Start: 2023-05-04

## 2023-05-15 DIAGNOSIS — D53.9 MACROCYTIC ANEMIA: ICD-10-CM

## 2023-05-15 RX ORDER — FOLIC ACID 1 MG/1
TABLET ORAL
Qty: 90 TABLET | Refills: 1 | Status: SHIPPED | OUTPATIENT
Start: 2023-05-15

## 2023-05-15 NOTE — TELEPHONE ENCOUNTER
Requested Prescriptions     Pending Prescriptions Disp Refills    folic acid (FOLVITE) 1 MG tablet [Pharmacy Med Name: FOLIC ACID 1 MG TABLET] 90 tablet 1     Sig: TAKE 1 TABLET BY MOUTH EVERY DAY **NOT COVERED**     [facility]ose [facility] and [facility] Patient is scheduled for follow up visit.

## 2023-05-30 DIAGNOSIS — M25.511 PAIN IN RIGHT SHOULDER: ICD-10-CM

## 2023-05-30 NOTE — TELEPHONE ENCOUNTER
Requested Prescriptions     Pending Prescriptions Disp Refills    diclofenac sodium (VOLTAREN) 1 % GEL [Pharmacy Med Name: DICLOFENAC SODIUM 1% GEL] 100 g 5     Sig: APPLY TO AFFECTED AREA FOUR (4) TIMES DAILY AS NEEDED FOR PAIN. Dose [facility] and [facility]. Patient is scheduled for follow up visit.

## 2023-07-16 DIAGNOSIS — E78.00 HYPERCHOLESTEROLEMIA: ICD-10-CM

## 2023-07-16 DIAGNOSIS — I10 HYPERTENSION, ESSENTIAL: ICD-10-CM

## 2023-07-16 DIAGNOSIS — I48.19 PERSISTENT ATRIAL FIBRILLATION (HCC): ICD-10-CM

## 2023-07-17 RX ORDER — SPIRONOLACTONE 25 MG/1
TABLET ORAL
Qty: 90 TABLET | Refills: 2 | Status: SHIPPED | OUTPATIENT
Start: 2023-07-17

## 2023-07-17 RX ORDER — CARVEDILOL 25 MG/1
TABLET ORAL
Qty: 180 TABLET | Refills: 2 | Status: SHIPPED | OUTPATIENT
Start: 2023-07-17

## 2023-07-17 RX ORDER — ATORVASTATIN CALCIUM 10 MG/1
TABLET, FILM COATED ORAL
Qty: 90 TABLET | Refills: 2 | Status: SHIPPED | OUTPATIENT
Start: 2023-07-17

## 2023-07-17 NOTE — TELEPHONE ENCOUNTER
Requested Prescriptions     Pending Prescriptions Disp Refills    atorvastatin (LIPITOR) 10 MG tablet [Pharmacy Med Name: ATORVASTATIN 10 MG TABLET] 90 tablet 2     Sig: TAKE 1 TABLET BY MOUTH EVERY DAY    spironolactone (ALDACTONE) 25 MG tablet [Pharmacy Med Name: SPIRONOLACTONE 25 MG TABLET] 90 tablet 2     Sig: TAKE 1 TABLET BY MOUTH EVERY DAY    carvedilol (COREG) 25 MG tablet [Pharmacy Med Name: CARVEDILOL 25 MG TABLET] 180 tablet 2     Sig: TAKE 1 TABLET BY MOUTH TWICE A DAY WITH MEALS

## 2023-07-19 ENCOUNTER — APPOINTMENT (OUTPATIENT)
Dept: GENERAL RADIOLOGY | Age: 73
End: 2023-07-19
Payer: MEDICARE

## 2023-07-19 ENCOUNTER — HOSPITAL ENCOUNTER (EMERGENCY)
Age: 73
Discharge: HOME | End: 2023-07-19
Attending: EMERGENCY MEDICINE
Payer: MEDICARE

## 2023-07-19 VITALS
WEIGHT: 232 LBS | TEMPERATURE: 97.5 F | OXYGEN SATURATION: 95 % | SYSTOLIC BLOOD PRESSURE: 94 MMHG | BODY MASS INDEX: 32.48 KG/M2 | RESPIRATION RATE: 15 BRPM | DIASTOLIC BLOOD PRESSURE: 64 MMHG | HEIGHT: 71 IN | HEART RATE: 64 BPM

## 2023-07-19 DIAGNOSIS — S82.832A CLOSED FRACTURE OF DISTAL END OF LEFT FIBULA, UNSPECIFIED FRACTURE MORPHOLOGY, INITIAL ENCOUNTER: Primary | ICD-10-CM

## 2023-07-19 PROCEDURE — 6370000000 HC RX 637 (ALT 250 FOR IP)

## 2023-07-19 PROCEDURE — 99283 EMERGENCY DEPT VISIT LOW MDM: CPT

## 2023-07-19 PROCEDURE — 73610 X-RAY EXAM OF ANKLE: CPT

## 2023-07-19 RX ORDER — HYDROCODONE BITARTRATE AND ACETAMINOPHEN 5; 325 MG/1; MG/1
1 TABLET ORAL
Status: COMPLETED | OUTPATIENT
Start: 2023-07-19 | End: 2023-07-19

## 2023-07-19 RX ORDER — HYDROCODONE BITARTRATE AND ACETAMINOPHEN 10; 325 MG/1; MG/1
0.5 TABLET ORAL EVERY 6 HOURS PRN
Qty: 10 TABLET | Refills: 0 | Status: SHIPPED | OUTPATIENT
Start: 2023-07-19 | End: 2023-07-24

## 2023-07-19 RX ADMIN — HYDROCODONE BITARTRATE AND ACETAMINOPHEN 1 TABLET: 5; 325 TABLET ORAL at 16:51

## 2023-07-19 ASSESSMENT — ENCOUNTER SYMPTOMS
VOMITING: 0
SHORTNESS OF BREATH: 0
ABDOMINAL PAIN: 0
NAUSEA: 0
CHEST TIGHTNESS: 0
DIARRHEA: 0
COLOR CHANGE: 0
WHEEZING: 0

## 2023-07-19 ASSESSMENT — PAIN - FUNCTIONAL ASSESSMENT: PAIN_FUNCTIONAL_ASSESSMENT: 0-10

## 2023-07-19 ASSESSMENT — LIFESTYLE VARIABLES
HOW MANY STANDARD DRINKS CONTAINING ALCOHOL DO YOU HAVE ON A TYPICAL DAY: PATIENT DOES NOT DRINK
HOW OFTEN DO YOU HAVE A DRINK CONTAINING ALCOHOL: NEVER

## 2023-07-19 ASSESSMENT — PAIN DESCRIPTION - LOCATION: LOCATION: ANKLE

## 2023-07-19 ASSESSMENT — PAIN SCALES - GENERAL: PAINLEVEL_OUTOF10: 8

## 2023-07-19 ASSESSMENT — PAIN DESCRIPTION - ORIENTATION: ORIENTATION: LEFT

## 2023-07-19 NOTE — ED TRIAGE NOTES
Pt to triage via wheelchair. Pt reports slipping and twisting left ankle 40 mins PTA. Swelling noted to left ankle. Abrasions noted to left elbow. Denies hitting head. Reports taking Xarelto.

## 2023-07-20 ENCOUNTER — OFFICE VISIT (OUTPATIENT)
Dept: ORTHOPEDIC SURGERY | Age: 73
End: 2023-07-20

## 2023-07-20 DIAGNOSIS — M25.572 ACUTE LEFT ANKLE PAIN: Primary | ICD-10-CM

## 2023-07-20 PROBLEM — S82.892A CLOSED FRACTURE OF LEFT ANKLE: Status: ACTIVE | Noted: 2023-07-20

## 2023-07-20 RX ORDER — HYDROCODONE BITARTRATE AND ACETAMINOPHEN 5; 325 MG/1; MG/1
1 TABLET ORAL
Qty: 30 TABLET | Refills: 0 | Status: SHIPPED | OUTPATIENT
Start: 2023-07-20 | End: 2023-07-25

## 2023-07-20 NOTE — PROGRESS NOTES
each of these risk  The patient was thoroughly informed regarding the Treatment Plan. Through shared decision making the patient elected to proceed with surgery and consented to the procedure. A new short leg splint was applied today. A gentle reduction maneuver was maintained to keep the ankle reduced. Past Medical History:   Diagnosis Date    BPH associated with nocturia 10/10/2011    Calculus of kidney     Chronic otitis media     Chronic renal disease, stage III [facility]) [[id number]] 6/16/2022    Conductive hearing loss     Deviated nasal septum     Dysfunction of both eustachian tubes     Elevated PSA 5/22/2017    External hemorrhoids     Hearing loss     Hyperlipidemia     Hypertension     Mixed hearing loss, bilateral     Obesity     PAF (paroxysmal atrial fibrillation) (HCC)     Perforation of left tympanic membrane     Right chronic serous otitis media     SNHL (sensorineural hearing loss)          Current Outpatient Medications:     HYDROcodone-acetaminophen (NORCO)  MG per tablet, Take 0.5 tablets by mouth every 6 hours as needed for Pain for up to 5 days.  Intended supply: 30 days Max Daily Amount: 2 tablets, Disp: 10 tablet, Rfl: 0    atorvastatin (LIPITOR) 10 MG tablet, TAKE 1 TABLET BY MOUTH EVERY DAY, Disp: 90 tablet, Rfl: 2    spironolactone (ALDACTONE) 25 MG tablet, TAKE 1 TABLET BY MOUTH EVERY DAY, Disp: 90 tablet, Rfl: 2    carvedilol (COREG) 25 MG tablet, TAKE 1 TABLET BY MOUTH TWICE A DAY WITH MEALS, Disp: 180 tablet, Rfl: 2    diclofenac sodium (VOLTAREN) 1 % GEL, APPLY TO AFFECTED AREA FOUR (4) TIMES DAILY AS NEEDED FOR PAIN., Disp: 881 g, Rfl: 5    folic acid (FOLVITE) 1 MG tablet, TAKE 1 TABLET BY MOUTH EVERY DAY **NOT COVERED**, Disp: 90 tablet, Rfl: 1    lisinopril (PRINIVIL;ZESTRIL) 20 MG tablet, TAKE 1 TABLET BY MOUTH TWO TIMES A DAY., Disp: 180 tablet, Rfl: 3    rivaroxaban (XARELTO) 20 MG TABS tablet, Take 1 tablet by mouth Daily with supper, Disp: 90 tablet, Rfl: 3

## 2023-07-20 NOTE — H&P (VIEW-ONLY)
Name: Rodrigo Sosa  YOB: 1950  Gender: male  MRN: [id number]    Summary:LeftAnkle Fracture: lateral mal.     A-fib on Xarelto-cardiac clearance pending    Proceed with ORIF left ankle  Popliteal saphenous block       CC: LeftAnkle Pain    HPI: Rodrigo Sosa is a 74 y.o. male who sustained a injury twisting his ankle on 7/19/23 . Today they have difficulty walking and bearing weight on this ankle due to pain. They describe pain with movement too. He has significant swelling and pain in this ankle and difficulty bearing weight. He presents with his son who is very helpful. The patient denies any chronic injuries or prior former injuries to this ankle. He has been independent ambulator without assistive devices prior to today. History was obtained by son and patient    ROS/Meds/PSH/PMH/FH/SH: below is tobacco and diabetes. A full history is at the bottom of the note. Tobacco:  reports that he quit smoking about 43 years ago. His smoking use included cigarettes. He has never used smokeless tobacco.  Diabetes: None  Past Medical History:   Diagnosis Date    BPH associated with nocturia 10/10/2011    Calculus of kidney     Chronic otitis media     Chronic renal disease, stage III [facility]) [[id number]] 6/16/2022    Conductive hearing loss     Deviated nasal septum     Dysfunction of both eustachian tubes     Elevated PSA 5/22/2017    External hemorrhoids     Hearing loss     Hyperlipidemia     Hypertension     Mixed hearing loss, bilateral     Obesity     PAF (paroxysmal atrial fibrillation) (HCC)     Perforation of left tympanic membrane     Right chronic serous otitis media     SNHL (sensorineural hearing loss)          Physical Examination:  LeftLower Extremity: edema and ecchymosis noted around the ankle. Edema limits pulse exam but foot is wwp. Decreased ROM at ankle due to pain and swelling. EHL/FHL/EDL/FDL intact. +SILT to t/s/s/dpn/spn intact.   TTP at the Lateral

## 2023-07-24 ENCOUNTER — CLINICAL DOCUMENTATION (OUTPATIENT)
Dept: ORTHOPEDIC SURGERY | Age: 73
End: 2023-07-24

## 2023-07-25 ENCOUNTER — TELEPHONE (OUTPATIENT)
Dept: UROLOGY | Age: 73
End: 2023-07-25

## 2023-07-25 NOTE — PERIOP NOTE
Patient verified name and . Order for consent not found in EHR; patient verifies procedure. Type 1B surgery, Phone assessment complete. Orders not received. Labs per surgeon: none  Labs per anesthesia protocol: Potassium    No contraindications for surgery regarding his AF per Dr Devora Recio. Patient answered medical/surgical history questions at their best of ability. All prior to admission medications documented in EPIC. Patient instructed to take the following medications the day of surgery according to anesthesia guidelines with a small sip of water: Lipitor, Coreg, Protonix, and Flomax. Hold all vitamins 7 days prior to surgery and NSAIDS 5 days prior to surgery. Prescription meds to hold:Vitamin B12 , Voltaren, and Xarelto as instructed by patient doctor. Patient instructed on the following:    > Arrive at [facility], time of arrival to be called the day before by 1700  > NPO after midnight, unless otherwise indicated, including gum, mints, and ice chips  > Responsible adult must drive patient to the hospital, stay during surgery, and patient will need supervision 24 hours after anesthesia  > Use antibacterial Soap in shower the night before surgery and on the morning of surgery  > All piercings must be removed prior to arrival.    > Leave all valuables (money and jewelry) at home but bring insurance card and ID on DOS.   > You may be required to pay a deductible or co-pay on the day of your procedure. You can pre-pay by calling [phone] if your surgery is at the [facility] or [phone] if your surgery is at the [facility]. > Do not wear make-up, nail polish, lotions, cologne, perfumes, powders, or oil on skin. Artificial nails are not permitted.

## 2023-07-27 ENCOUNTER — TELEPHONE (OUTPATIENT)
Dept: ORTHOPEDIC SURGERY | Age: 73
End: 2023-07-27

## 2023-07-28 ENCOUNTER — TELEPHONE (OUTPATIENT)
Dept: INTERNAL MEDICINE CLINIC | Facility: CLINIC | Age: 73
End: 2023-07-28

## 2023-07-28 DIAGNOSIS — M25.572 ACUTE LEFT ANKLE PAIN: Primary | ICD-10-CM

## 2023-07-28 RX ORDER — OXYCODONE HYDROCHLORIDE AND ACETAMINOPHEN 5; 325 MG/1; MG/1
1 TABLET ORAL EVERY 6 HOURS PRN
Qty: 30 TABLET | Refills: 0 | Status: SHIPPED | OUTPATIENT
Start: 2023-07-28 | End: 2023-08-11

## 2023-07-28 RX ORDER — DOCUSATE SODIUM 100 MG/1
100 CAPSULE, LIQUID FILLED ORAL DAILY PRN
Qty: 30 CAPSULE | Refills: 0 | Status: SHIPPED | OUTPATIENT
Start: 2023-07-28

## 2023-07-28 RX ORDER — ONDANSETRON 4 MG/1
4 TABLET, FILM COATED ORAL DAILY PRN
Qty: 30 TABLET | Refills: 0 | Status: SHIPPED | OUTPATIENT
Start: 2023-07-28

## 2023-07-28 NOTE — TELEPHONE ENCOUNTER
Patient is scheduled to have left ankle surgery on Monday 07/31/2023. Currently taking Norco and when he takes Norco and BP medication, his BP went low. Yesterday 89/60. After he didn't take BP medication BP increase to 105/78.  Patient wants to know he should be taking BP while on Norco

## 2023-07-28 NOTE — TELEPHONE ENCOUNTER
Advised patient to temporarily hold lisinopril and Aldactone while blood pressure is low due to Norco.  He should try to continue carvedilol if possible as that is also for atrial fibrillation.

## 2023-07-28 NOTE — PERIOP NOTE
Preop department called to notify patient of arrival time for scheduled procedure. Instructions given to   - Arrive at [address] [facility]. - Remain NPO after midnight, unless otherwise indicated, including gum, mints, and ice chips. - Have a responsible adult to drive patient to the hospital, stay during surgery, and patient will need supervision 24 hours after anesthesia. - Use antibacterial soap in shower the night before surgery and on the morning of surgery.        Was patient contacted: yes  Voicemail left:   Numbers contacted: [phone]   Arrival time: 0600

## 2023-07-30 ENCOUNTER — ANESTHESIA EVENT (OUTPATIENT)
Dept: SURGERY | Age: 73
End: 2023-07-30
Payer: MEDICARE

## 2023-07-31 ENCOUNTER — HOSPITAL ENCOUNTER (OUTPATIENT)
Age: 73
Setting detail: OUTPATIENT SURGERY
Discharge: HOME | End: 2023-07-31
Attending: ORTHOPAEDIC SURGERY | Admitting: ORTHOPAEDIC SURGERY
Payer: MEDICARE

## 2023-07-31 ENCOUNTER — APPOINTMENT (OUTPATIENT)
Dept: GENERAL RADIOLOGY | Age: 73
End: 2023-07-31
Attending: ORTHOPAEDIC SURGERY
Payer: MEDICARE

## 2023-07-31 ENCOUNTER — ANESTHESIA (OUTPATIENT)
Dept: SURGERY | Age: 73
End: 2023-07-31
Payer: MEDICARE

## 2023-07-31 ENCOUNTER — TELEPHONE (OUTPATIENT)
Dept: ORTHOPEDIC SURGERY | Age: 73
End: 2023-07-31

## 2023-07-31 VITALS
TEMPERATURE: 97.6 F | BODY MASS INDEX: 33.21 KG/M2 | WEIGHT: 232 LBS | HEIGHT: 70 IN | RESPIRATION RATE: 16 BRPM | OXYGEN SATURATION: 96 % | SYSTOLIC BLOOD PRESSURE: 105 MMHG | HEART RATE: 59 BPM | DIASTOLIC BLOOD PRESSURE: 64 MMHG

## 2023-07-31 PROCEDURE — 2709999900 HC NON-CHARGEABLE SUPPLY: Performed by: ORTHOPAEDIC SURGERY

## 2023-07-31 PROCEDURE — 64447 NJX AA&/STRD FEMORAL NRV IMG: CPT | Performed by: ANESTHESIOLOGY

## 2023-07-31 PROCEDURE — 64445 NJX AA&/STRD SCIATIC NRV IMG: CPT | Performed by: ANESTHESIOLOGY

## 2023-07-31 PROCEDURE — 7100000001 HC PACU RECOVERY - ADDTL 15 MIN: Performed by: ORTHOPAEDIC SURGERY

## 2023-07-31 PROCEDURE — 27829 TREAT LOWER LEG JOINT: CPT | Performed by: ORTHOPAEDIC SURGERY

## 2023-07-31 PROCEDURE — 2500000003 HC RX 250 WO HCPCS

## 2023-07-31 PROCEDURE — 2720000010 HC SURG SUPPLY STERILE: Performed by: ORTHOPAEDIC SURGERY

## 2023-07-31 PROCEDURE — 6360000002 HC RX W HCPCS: Performed by: ORTHOPAEDIC SURGERY

## 2023-07-31 PROCEDURE — 6360000002 HC RX W HCPCS: Performed by: ANESTHESIOLOGY

## 2023-07-31 PROCEDURE — C1713 ANCHOR/SCREW BN/BN,TIS/BN: HCPCS | Performed by: ORTHOPAEDIC SURGERY

## 2023-07-31 PROCEDURE — 27792 TREATMENT OF ANKLE FRACTURE: CPT | Performed by: ORTHOPAEDIC SURGERY

## 2023-07-31 PROCEDURE — 7100000000 HC PACU RECOVERY - FIRST 15 MIN: Performed by: ORTHOPAEDIC SURGERY

## 2023-07-31 PROCEDURE — 2580000003 HC RX 258: Performed by: ANESTHESIOLOGY

## 2023-07-31 PROCEDURE — 7100000010 HC PHASE II RECOVERY - FIRST 15 MIN: Performed by: ORTHOPAEDIC SURGERY

## 2023-07-31 PROCEDURE — 3700000000 HC ANESTHESIA ATTENDED CARE: Performed by: ORTHOPAEDIC SURGERY

## 2023-07-31 PROCEDURE — 2500000003 HC RX 250 WO HCPCS: Performed by: ANESTHESIOLOGY

## 2023-07-31 PROCEDURE — 3600000014 HC SURGERY LEVEL 4 ADDTL 15MIN: Performed by: ORTHOPAEDIC SURGERY

## 2023-07-31 PROCEDURE — 3700000001 HC ADD 15 MINUTES (ANESTHESIA): Performed by: ORTHOPAEDIC SURGERY

## 2023-07-31 PROCEDURE — 3600000004 HC SURGERY LEVEL 4 BASE: Performed by: ORTHOPAEDIC SURGERY

## 2023-07-31 PROCEDURE — 6360000002 HC RX W HCPCS

## 2023-07-31 PROCEDURE — 6370000000 HC RX 637 (ALT 250 FOR IP): Performed by: ANESTHESIOLOGY

## 2023-07-31 DEVICE — IMPLANTABLE DEVICE
Type: IMPLANTABLE DEVICE | Site: ANKLE | Status: FUNCTIONAL
Brand: ORTHOLOC 3DI

## 2023-07-31 DEVICE — LOW PRO CORT SCREW 2.7X18MM ORTHOLOC™ SYSTEM
Type: IMPLANTABLE DEVICE | Site: ANKLE | Status: FUNCTIONAL
Brand: ORTHOLOC

## 2023-07-31 DEVICE — IMPLANTABLE DEVICE
Type: IMPLANTABLE DEVICE | Site: ANKLE | Status: FUNCTIONAL
Brand: ORTHOLOC 3DI PLATING SYSTEM

## 2023-07-31 DEVICE — IMPLANTABLE DEVICE
Type: IMPLANTABLE DEVICE | Site: ANKLE | Status: FUNCTIONAL
Brand: ORTHOLOC

## 2023-07-31 RX ORDER — SODIUM CHLORIDE 9 MG/ML
INJECTION, SOLUTION INTRAVENOUS PRN
Status: DISCONTINUED | OUTPATIENT
Start: 2023-07-31 | End: 2023-07-31 | Stop reason: HOSPADM

## 2023-07-31 RX ORDER — ROPIVACAINE HYDROCHLORIDE 5 MG/ML
INJECTION, SOLUTION EPIDURAL; INFILTRATION; PERINEURAL
Status: COMPLETED | OUTPATIENT
Start: 2023-07-31 | End: 2023-07-31

## 2023-07-31 RX ORDER — ACETAMINOPHEN 500 MG
1000 TABLET ORAL ONCE
Status: COMPLETED | OUTPATIENT
Start: 2023-07-31 | End: 2023-07-31

## 2023-07-31 RX ORDER — HALOPERIDOL 5 MG/ML
1 INJECTION INTRAMUSCULAR
Status: DISCONTINUED | OUTPATIENT
Start: 2023-07-31 | End: 2023-07-31 | Stop reason: HOSPADM

## 2023-07-31 RX ORDER — SODIUM CHLORIDE 0.9 % (FLUSH) 0.9 %
5-40 SYRINGE (ML) INJECTION EVERY 12 HOURS SCHEDULED
Status: DISCONTINUED | OUTPATIENT
Start: 2023-07-31 | End: 2023-07-31 | Stop reason: HOSPADM

## 2023-07-31 RX ORDER — LIDOCAINE HYDROCHLORIDE AND EPINEPHRINE 20; 5 MG/ML; UG/ML
INJECTION, SOLUTION EPIDURAL; INFILTRATION; INTRACAUDAL; PERINEURAL PRN
Status: DISCONTINUED | OUTPATIENT
Start: 2023-07-31 | End: 2023-07-31 | Stop reason: SDUPTHER

## 2023-07-31 RX ORDER — SODIUM CHLORIDE 0.9 % (FLUSH) 0.9 %
5-40 SYRINGE (ML) INJECTION PRN
Status: DISCONTINUED | OUTPATIENT
Start: 2023-07-31 | End: 2023-07-31 | Stop reason: HOSPADM

## 2023-07-31 RX ORDER — SODIUM CHLORIDE, SODIUM LACTATE, POTASSIUM CHLORIDE, CALCIUM CHLORIDE 600; 310; 30; 20 MG/100ML; MG/100ML; MG/100ML; MG/100ML
INJECTION, SOLUTION INTRAVENOUS CONTINUOUS
Status: DISCONTINUED | OUTPATIENT
Start: 2023-07-31 | End: 2023-07-31 | Stop reason: HOSPADM

## 2023-07-31 RX ORDER — LIDOCAINE HYDROCHLORIDE 20 MG/ML
INJECTION, SOLUTION EPIDURAL; INFILTRATION; INTRACAUDAL; PERINEURAL PRN
Status: DISCONTINUED | OUTPATIENT
Start: 2023-07-31 | End: 2023-07-31 | Stop reason: SDUPTHER

## 2023-07-31 RX ORDER — MIDAZOLAM HYDROCHLORIDE 2 MG/2ML
2 INJECTION, SOLUTION INTRAMUSCULAR; INTRAVENOUS
Status: COMPLETED | OUTPATIENT
Start: 2023-07-31 | End: 2023-07-31

## 2023-07-31 RX ORDER — DEXTROSE MONOHYDRATE 100 MG/ML
INJECTION, SOLUTION INTRAVENOUS CONTINUOUS PRN
Status: DISCONTINUED | OUTPATIENT
Start: 2023-07-31 | End: 2023-07-31 | Stop reason: HOSPADM

## 2023-07-31 RX ORDER — ONDANSETRON 2 MG/ML
4 INJECTION INTRAMUSCULAR; INTRAVENOUS
Status: DISCONTINUED | OUTPATIENT
Start: 2023-07-31 | End: 2023-07-31 | Stop reason: HOSPADM

## 2023-07-31 RX ORDER — FENTANYL CITRATE 50 UG/ML
100 INJECTION, SOLUTION INTRAMUSCULAR; INTRAVENOUS
Status: COMPLETED | OUTPATIENT
Start: 2023-07-31 | End: 2023-07-31

## 2023-07-31 RX ORDER — OXYCODONE HYDROCHLORIDE 5 MG/1
5 TABLET ORAL
Status: DISCONTINUED | OUTPATIENT
Start: 2023-07-31 | End: 2023-07-31 | Stop reason: HOSPADM

## 2023-07-31 RX ORDER — PROPOFOL 10 MG/ML
INJECTION, EMULSION INTRAVENOUS PRN
Status: DISCONTINUED | OUTPATIENT
Start: 2023-07-31 | End: 2023-07-31 | Stop reason: SDUPTHER

## 2023-07-31 RX ORDER — LIDOCAINE HYDROCHLORIDE 10 MG/ML
1 INJECTION, SOLUTION INFILTRATION; PERINEURAL
Status: DISCONTINUED | OUTPATIENT
Start: 2023-07-31 | End: 2023-07-31 | Stop reason: HOSPADM

## 2023-07-31 RX ORDER — EPHEDRINE SULFATE/0.9% NACL/PF 50 MG/5 ML
SYRINGE (ML) INTRAVENOUS PRN
Status: DISCONTINUED | OUTPATIENT
Start: 2023-07-31 | End: 2023-07-31 | Stop reason: SDUPTHER

## 2023-07-31 RX ORDER — HYDROMORPHONE HYDROCHLORIDE 2 MG/ML
0.25 INJECTION, SOLUTION INTRAMUSCULAR; INTRAVENOUS; SUBCUTANEOUS EVERY 5 MIN PRN
Status: DISCONTINUED | OUTPATIENT
Start: 2023-07-31 | End: 2023-07-31 | Stop reason: HOSPADM

## 2023-07-31 RX ADMIN — MIDAZOLAM 2 MG: 1 INJECTION INTRAMUSCULAR; INTRAVENOUS at 07:16

## 2023-07-31 RX ADMIN — Medication 5 MG: at 08:22

## 2023-07-31 RX ADMIN — Medication 2000 MG: at 07:40

## 2023-07-31 RX ADMIN — PROPOFOL 50 MG: 10 INJECTION, EMULSION INTRAVENOUS at 07:41

## 2023-07-31 RX ADMIN — SODIUM CHLORIDE, POTASSIUM CHLORIDE, SODIUM LACTATE AND CALCIUM CHLORIDE: 600; 310; 30; 20 INJECTION, SOLUTION INTRAVENOUS at 07:05

## 2023-07-31 RX ADMIN — PROPOFOL 100 MCG/KG/MIN: 10 INJECTION, EMULSION INTRAVENOUS at 07:43

## 2023-07-31 RX ADMIN — ROPIVACAINE HYDROCHLORIDE 20 ML: 5 INJECTION, SOLUTION EPIDURAL; INFILTRATION; PERINEURAL at 07:14

## 2023-07-31 RX ADMIN — LIDOCAINE HYDROCHLORIDE AND EPINEPHRINE 10 ML: 20; 5 INJECTION, SOLUTION EPIDURAL; INFILTRATION; INTRACAUDAL; PERINEURAL at 07:18

## 2023-07-31 RX ADMIN — DEXAMETHASONE SODIUM PHOSPHATE 4 MG: 4 INJECTION, SOLUTION INTRAMUSCULAR; INTRAVENOUS at 07:14

## 2023-07-31 RX ADMIN — LIDOCAINE HYDROCHLORIDE 60 MG: 20 INJECTION, SOLUTION EPIDURAL; INFILTRATION; INTRACAUDAL; PERINEURAL at 07:41

## 2023-07-31 RX ADMIN — LIDOCAINE HYDROCHLORIDE AND EPINEPHRINE 10 ML: 20; 5 INJECTION, SOLUTION EPIDURAL; INFILTRATION; INTRACAUDAL; PERINEURAL at 07:14

## 2023-07-31 RX ADMIN — ACETAMINOPHEN 1000 MG: 500 TABLET, FILM COATED ORAL at 07:05

## 2023-07-31 RX ADMIN — FENTANYL CITRATE 100 MCG: 0.05 INJECTION, SOLUTION INTRAMUSCULAR; INTRAVENOUS at 07:16

## 2023-07-31 RX ADMIN — ROPIVACAINE HYDROCHLORIDE 15 ML: 5 INJECTION, SOLUTION EPIDURAL; INFILTRATION; PERINEURAL at 07:18

## 2023-07-31 RX ADMIN — Medication 5 MG: at 07:58

## 2023-07-31 RX ADMIN — Medication 5 MG: at 08:10

## 2023-07-31 ASSESSMENT — PAIN SCALES - GENERAL
PAINLEVEL_OUTOF10: 0
PAINLEVEL_OUTOF10: 0

## 2023-07-31 ASSESSMENT — PAIN - FUNCTIONAL ASSESSMENT: PAIN_FUNCTIONAL_ASSESSMENT: 0-10

## 2023-07-31 NOTE — DISCHARGE INSTRUCTIONS
any--you can put on your leg. Be sure your crutches fit you. When you stand up in your normal posture, there should be space for two or three fingers between the top of the crutch and your armpit. When you let your hands hang down, the hand  should be at your wrists. When you put your hands on the hand , your elbows should be slightly bent. To stay safe when using crutches:  Look straight ahead, not down at your feet. Clear away small rugs, cords, or anything else that could cause you to trip, slip, or fall. Be very careful around pets and small children. They can get in your path when you least expect it. Be sure the rubber tips on your crutches are clean and in good condition to help prevent slipping. Avoid slick conditions, such as wet floors and snowy or icy driveways. In bad weather, be especially careful on curbs and steps. How to use crutches  Getting ready to walk    [facility] your elbows slightly. Press the padded top parts of the crutches against your sides, under your armpits. If you have been told not to put any weight on your injured leg, keep that leg bent and off the ground. Walking with crutches    Put both crutches about 12 inches in front of you. Put your weight on the handgrips, not on the pads under your arms. (Constant pressure against your underarms can cause numbness.) Swing your body forward. (If you have been told not to put any weight on your injured leg, keep that leg bent and off the ground.)  To complete the step, put your weight on the strong leg. Move your crutches about 12 inches in front of you, and start the next step. When you're confident using the crutches, you can move the crutches and your injured leg at the same time. Then push straight down on the crutches as you step past the crutches with your strong leg, as you would in normal walking. Take small steps. Use ramps and elevators when you can. Sitting down    To sit, back up to the chair.  Use one hand to

## 2023-07-31 NOTE — ANESTHESIA POSTPROCEDURE EVALUATION
Department of Anesthesiology  Postprocedure Note    Patient: Prakash Conte  MRN: [id number]  YOB: 1950  Date of evaluation: 7/31/2023      Procedure Summary     Date: 07/31/23 Room / Location: [facility] OP OR  / [facility]    Anesthesia Start: 0725 Anesthesia Stop: 0941    Procedure: LEFT ANKLE OPEN REDUCTION INTERNAL FIXATION (Left: Ankle) Diagnosis:       Closed fracture of left ankle, initial encounter      (Closed fracture of left ankle, initial encounter [F46.013A])    Surgeons: Saige Morales MD Responsible Provider: Priya Banks MD    Anesthesia Type: TIVA ASA Status: 3          Anesthesia Type: TIVA    Denis Phase I: Denis Score: 8    Denis Phase II: Denis Score: 10      Anesthesia Post Evaluation    Patient location during evaluation: PACU  Patient participation: complete - patient participated  Level of consciousness: awake and alert  Airway patency: patent  Nausea: well controlled. Complications: no  Cardiovascular status: acceptable.   Respiratory status: acceptable  Hydration status: stable  Pain management: adequate

## 2023-07-31 NOTE — OP NOTE
Implanted   SCREW BNE L55MM DIA3.5MM SACHA TI FOREFOOT MIDFOOT ANK - [id number]  SCREW BNE L55MM DIA3.5MM SACHA TI FOREFOOT MIDFOOT ANK  [employer] [facility]- [id number] Left 1 Implanted       Anesthesia:  Regional    Blood Loss:  10cc    Tourniquet:  Estimated thigh tourniquet at 250 mmHg-60 minutes    Pre Operative Abx:   Ancef 2g    Specimens/Cultures:  none    Significant Findings:  Comminuted fibular fracture  Syndesmosis rupture-Ward C    Pre Operative Course:  74 y.o. male who sustained an ankle fracture on 7/19/2023. He went to the emergency room and was diagnosed with a fracture and placed into a splint. In my office we discussed. After discussion with the patient we decided to proceed with surgical fixation of the ankle with plates, screws, and wires. He then obtained cardiac clearance. He has held his Xarelto for 48 hours. Operation In Detail:  Patient was evaluated in the preoperative area. The left lower extremity was marked by me. We had a long discussion about the procedure and postoperative protocols. The patient was then brought back to the operating room suite and placed in the operating room table. A timeout was taken to identify the patient, procedure being performed, and laterality. After this the patient was prepped and draped in the normal sterile fashion using a Betadine solution and/or a ChloraPrep solution. A timeout was then taken to identify the patient his name, date of birth, laterality, and procedure being performed. We also identified allergies and any concerns about the operation. Attention was then placed to the operative extremity. Intravenous antibiotics were given: 2 g IV Ancef    The leg was exsanguinated with an Esmarch and a thigh tourniquet was inflated to 250 mmHg. A lateral incision over the fibula was made with a 15 blade knife.  The skin was incised, the superficial perineal nerve protected, and this dissection was taken down to the

## 2023-07-31 NOTE — INTERVAL H&P NOTE
Update History & Physical    The Patient's History and Physical was reviewed   I discussed the surgery and patients medical condition with the patient. The chart was reviewed with the patient and I examined the patient. There was no change. The surgical site was confirmed by the patient and me. CV: RRR  RESP: CTAB    Plan:  The risk, benefits, expected outcome, and alternative to the recommended procedure have been discussed with the patient. Patient understands and wants to proceed with the procedure.     Electronically signed by Tessa Portillo MD on 07/31/23 6:42 AM

## 2023-07-31 NOTE — PERIOP NOTE
PACU DISCHARGE NOTE    Pt and family verbalized understanding of discharge inst. Pt tolerated po fluids well. Vital signs stable, pain well controlled, alert and oriented times three or at baseline, follow up per surgeon, no anesthetic complications.

## 2023-07-31 NOTE — ANESTHESIA PROCEDURE NOTES
Peripheral Block    Patient location during procedure: pre-op  Reason for block: post-op pain management and at surgeon's request  Start time: 7/31/2023 7:18 AM  End time: 7/31/2023 7:20 AM  Staffing  Performed: anesthesiologist   Anesthesiologist: Brandie Rosas MD  Preanesthetic Checklist  Completed: patient identified, IV checked, site marked, risks and benefits discussed, surgical/procedural consents, equipment checked, pre-op evaluation, timeout performed, anesthesia consent given, oxygen available and monitors applied/VS acknowledged  Peripheral Block   Patient position: supine  Prep: ChloraPrep  Provider prep: sterile gloves and mask  Patient monitoring: cardiac monitor, continuous pulse ox, frequent blood pressure checks, IV access, oxygen and responsive to questions  Block type: Femoral  Adductor canal  Laterality: left  Injection technique: single-shot  Guidance: ultrasound guided    Needle   Needle type: insulated echogenic nerve stimulator needle   Needle gauge: 20 G  Needle localization: ultrasound guidance  Needle length: 10 cm  Assessment   Injection assessment: negative aspiration for heme, no paresthesia on injection, local visualized surrounding nerve on ultrasound and no intravascular symptoms  Paresthesia pain: none  Slow fractionated injection: yes  Hemodynamics: stable  Real-time US image taken/store: yes  Outcomes: uncomplicated and patient tolerated procedure well    Additional Notes  Ultrasound image taken and stored in chart     15cc 0.5% Ropi + 4mg Decadron + 10cc 2% Lido with Epi  Medications Administered  dexamethasone (DECADRON) injection 4 mg/mL - Perineural   4 mg - 7/31/2023 7:18:00 AM  ropivacaine (NAROPIN) injection 0.5% - Perineural   15 mL - 7/31/2023 7:18:00 AM

## 2023-07-31 NOTE — ANESTHESIA PROCEDURE NOTES
Peripheral Block    Patient location during procedure: pre-op  Reason for block: post-op pain management and at surgeon's request  Start time: 7/31/2023 7:14 AM  End time: 7/31/2023 7:18 AM  Staffing  Performed: anesthesiologist   Anesthesiologist: Sheryl Blevins MD  Preanesthetic Checklist  Completed: patient identified, IV checked, site marked, risks and benefits discussed, surgical/procedural consents, equipment checked, pre-op evaluation, timeout performed, anesthesia consent given, oxygen available and monitors applied/VS acknowledged  Peripheral Block   Patient position: supine  Prep: ChloraPrep  Provider prep: mask and sterile gloves  Patient monitoring: cardiac monitor, continuous pulse ox, frequent blood pressure checks, IV access, oxygen and responsive to questions  Block type: Sciatic  Popliteal  Laterality: left  Injection technique: single-shot  Guidance: ultrasound guided    Needle   Needle type: insulated echogenic nerve stimulator needle   Needle gauge: 20 G  Needle localization: ultrasound guidance  Needle length: 10 cm  Assessment   Injection assessment: negative aspiration for heme, no paresthesia on injection, local visualized surrounding nerve on ultrasound and no intravascular symptoms  Paresthesia pain: none  Slow fractionated injection: yes  Hemodynamics: stable  Real-time US image taken/store: yes  Outcomes: uncomplicated and patient tolerated procedure well    Additional Notes  Ultrasound image taken and stored in chart     20cc 0.5% Ropi + 4mg Decadron + 10cc 2% Lido with Epi  Medications Administered  dexamethasone (DECADRON) injection 4 mg/mL - Perineural   4 mg - 7/31/2023 7:14:00 AM  ropivacaine (NAROPIN) injection 0.5% - Perineural   20 mL - 7/31/2023 7:14:00 AM

## 2023-08-03 ENCOUNTER — TELEPHONE (OUTPATIENT)
Dept: ORTHOPEDIC SURGERY | Age: 73
End: 2023-08-03

## 2023-08-03 NOTE — TELEPHONE ENCOUNTER
He had surgery recently and his daughter is calling to get recommendations for his constipation issues. Please call.

## 2023-08-22 ENCOUNTER — OFFICE VISIT (OUTPATIENT)
Dept: ORTHOPEDIC SURGERY | Age: 73
End: 2023-08-22

## 2023-08-22 DIAGNOSIS — M25.572 ACUTE LEFT ANKLE PAIN: Primary | ICD-10-CM

## 2023-08-22 RX ORDER — OFLOXACIN 3 MG/ML
SOLUTION/ DROPS OPHTHALMIC
COMMUNITY
Start: 2023-07-11

## 2023-08-22 NOTE — PROGRESS NOTES
The patient was prescribed a walker boot for the patient's left foot. The patient wears a size 10 shoe and I fitted them with a M size boot. The patient was fitted and instructed on the use of prescribed walker boot. I explained how to fit themselves and that the plastic flexible piece should always be on the front of the boot and secured by the Velcro straps on top. The air bladder in the boot was adjusted according to proper fit and comfort. The patient walked a short distance and acknowledged satisfaction with current fit. I also explained that they need a heel lift or a higher heeled shoe for the uninvolved LE to help normalize gait and avoid excessive low back stress/strain due to leg length inequality created from walker boot. Patient read and signed documenting they understand and agree to [facility]'s current DME return policy.

## 2023-08-22 NOTE — PROGRESS NOTES
Name: Joseph Soler  YOB: 1950  Gender: male  MRN: [id number]      Procedure: Left Ankle Open Reduction Internal Fixation - Left  Surgery Date: 7/31/2023    Subjective: Denies fevers chills or infection. Denies any signs of spreading erythema. Describes some irritation and dry skin. Has been difficult for him to get off of his foot but he is using the scooter and has been nonweightbearing    ROS: Patient Denies fever/chills, headache, visual changes, chest pain, shortness of breath, and nausea/vomiting/diarrhea     Exam:   Gen: AAOx3 NAD  Resp: CTAB - no wheezing  Card: RRR  Eyes: sclera non icteric    Cast/Splint: Intact  Wound healing appropriately. No signs of dehiscence or infection. No signs of erythema or drainage. With toes warm and well-perfused. All the sutures were removed. The central aspect of the wound has scabbed over and is not completely healed. However it is not open it is a thick scab at the central aspect of the wound. ROM decreased at ankle due to stiffness. Some mild pain w/ ankle ROM. However AT/PT/GS/Peroneals/EHL/FHL/EDL/FDL intact. Strength and ROM limited by stiffness. normal SILT to s/s/sp/dp/t. Imaging:   I independently interpreted XR taken today and   X-Ray LEFT Ankle 3 vw (AP/Lateral/Oblique) for ankle pain   Findings: Fixation intact. No signs of fixation loss. Fibula, syndesmosis and medial clear space are aligned well. Tibiotalar joint remains intact   Impression: Stable postoperative fixation   Signature: Carley Seals MD         Assessment/Plan:    Due to some irritation and scabbing of the wound to have some concerns about putting him in a cast.  Therefore put in a boot to be to evaluate the central aspect the wound to make sure it heals. He can shower and bathe and no submerging it.     Weight-bearing status: NWB        Return to work/work restrictions: none  OTC NSAIDs - We discussed using OTC NSAID's or tylenol for

## 2023-09-15 ENCOUNTER — OFFICE VISIT (OUTPATIENT)
Dept: ORTHOPEDIC SURGERY | Age: 73
End: 2023-09-15

## 2023-09-15 DIAGNOSIS — M25.572 ACUTE LEFT ANKLE PAIN: Primary | ICD-10-CM

## 2023-09-15 NOTE — PROGRESS NOTES
Name: Thomas Santoyo  YOB: 1950  Gender: male  MRN: [id number]      Procedure: Left Ankle Open Reduction Internal Fixation - Left  Surgery Date: 7/31/2023    Subjective: Denies fevers chills or infection. Denies any signs of spreading erythema. Describes some irritation and dry skin. Has been difficult for him to get off of his foot but he is using the scooter and has been nonweightbearing    ROS: Patient Denies fever/chills, headache, visual changes, chest pain, shortness of breath, and nausea/vomiting/diarrhea     Exam:   Gen: AAOx3 NAD  Resp: CTAB - no wheezing  Card: RRR  Eyes: sclera non icteric    Cast/Splint: Intact  Wound healing appropriately. No signs of dehiscence or infection. No signs of erythema or drainage. With toes warm and well-perfused. All the sutures were removed. The central aspect of the wound has scabbed over and is not completely healed. However it is not open it is a thick scab at the central aspect of the wound. ROM decreased at ankle due to stiffness. Some mild pain w/ ankle ROM. However AT/PT/GS/Peroneals/EHL/FHL/EDL/FDL intact. Strength and ROM limited by stiffness. normal SILT to s/s/sp/dp/t. Imaging:   I independently interpreted XR taken today and   X-Ray LEFT Ankle 3 vw (AP/Lateral/Oblique) for ankle pain   Findings: Fixation intact. No signs of fixation loss. Fibula, syndesmosis and medial clear space are aligned well. Tibiotalar joint remains intact   Impression: Stable postoperative fixation   Signature: Jose David Mcdaniel MD         Assessment/Plan:    Due to some irritation and scabbing of the wound to have some concerns about putting him in a cast.  Therefore put in a boot to be to evaluate the central aspect the wound to make sure it heals. He can shower and bathe and no submerging it.     Weight-bearing status: NWB        Return to work/work restrictions: none  OTC NSAIDs - We discussed using OTC NSAID's or tylenol for

## 2023-09-15 NOTE — PROGRESS NOTES
The patient was prescribed a Wraptor brace for the patient's rightfoot. The patient wears a size 10 shoe and I fitted the patient with a XL brace. I explained how to fit the brace properly by pulling the lace tabs across top of foot first then under arch and lastly pulling the strap up firmly and attaching to the lateral Velcro strip. Thus forming a figure 8 across the ankle joint. Once the figure 8 is completed they are to secure the top (short circumferential) straps to help avoid the straps from loosening with normal wear. The patient was able to demonstrate proper fitting in office to ensure compliance with device and acknowledged satisfaction with current fit. Patient read and signed documenting they understand and agree to [facility]'s current DME return policy.

## 2023-09-19 ENCOUNTER — OFFICE VISIT (OUTPATIENT)
Age: 73
End: 2023-09-19
Payer: MEDICARE

## 2023-09-19 VITALS
BODY MASS INDEX: 32.48 KG/M2 | WEIGHT: 232 LBS | DIASTOLIC BLOOD PRESSURE: 76 MMHG | SYSTOLIC BLOOD PRESSURE: 132 MMHG | HEIGHT: 71 IN | HEART RATE: 66 BPM

## 2023-09-19 DIAGNOSIS — I10 HYPERTENSION, ESSENTIAL: ICD-10-CM

## 2023-09-19 DIAGNOSIS — D68.69 SECONDARY HYPERCOAGULABLE STATE (HCC): ICD-10-CM

## 2023-09-19 DIAGNOSIS — I71.21 ANEURYSM OF ASCENDING AORTA WITHOUT RUPTURE (HCC): Primary | ICD-10-CM

## 2023-09-19 DIAGNOSIS — I48.0 PAF (PAROXYSMAL ATRIAL FIBRILLATION) (HCC): ICD-10-CM

## 2023-09-19 DIAGNOSIS — E78.00 HYPERCHOLESTEROLEMIA: ICD-10-CM

## 2023-09-19 PROCEDURE — 1036F TOBACCO NON-USER: CPT | Performed by: INTERNAL MEDICINE

## 2023-09-19 PROCEDURE — 3078F DIAST BP <80 MM HG: CPT | Performed by: INTERNAL MEDICINE

## 2023-09-19 PROCEDURE — G8417 CALC BMI ABV UP PARAM F/U: HCPCS | Performed by: INTERNAL MEDICINE

## 2023-09-19 PROCEDURE — 99214 OFFICE O/P EST MOD 30 MIN: CPT | Performed by: INTERNAL MEDICINE

## 2023-09-19 PROCEDURE — 1123F ACP DISCUSS/DSCN MKR DOCD: CPT | Performed by: INTERNAL MEDICINE

## 2023-09-19 PROCEDURE — G8427 DOCREV CUR MEDS BY ELIG CLIN: HCPCS | Performed by: INTERNAL MEDICINE

## 2023-09-19 PROCEDURE — 3075F SYST BP GE 130 - 139MM HG: CPT | Performed by: INTERNAL MEDICINE

## 2023-09-19 PROCEDURE — 3017F COLORECTAL CA SCREEN DOC REV: CPT | Performed by: INTERNAL MEDICINE

## 2023-09-19 ASSESSMENT — ENCOUNTER SYMPTOMS
BACK PAIN: 0
EYE PAIN: 0
ALLERGIC/IMMUNOLOGIC NEGATIVE: 1
ABDOMINAL PAIN: 0
EYES NEGATIVE: 1
SHORTNESS OF BREATH: 0
PHOTOPHOBIA: 0
CHEST TIGHTNESS: 0
RESPIRATORY NEGATIVE: 1
GASTROINTESTINAL NEGATIVE: 1

## 2023-09-19 NOTE — PROGRESS NOTES
today. Lab Results   Component Value Date/Time    BUN 24 02/10/2023 07:46 AM     No results found for: \"SANDRA\", \"CREAPOC\", \"CREA\"  Lab Results   Component Value Date/Time    K 4.8 02/10/2023 07:46 AM       Lab Results   Component Value Date/Time    CHOL 121 11/09/2022 09:30 AM    HDL 41 11/09/2022 09:30 AM    VLDL 16 01/11/2021 11:29 AM       ASSESSMENT and PLAN    ICD-10-CM    1. Aneurysm of ascending aorta without rupture (HCC)  I71.21 CTA CHEST W WO CONTRAST      2. PAF (paroxysmal atrial fibrillation) (HCC)  I48.0       3. Secondary hypercoagulable state ([address])  D68.69       4. Hypertension, essential  I10       5. Hypercholesterolemia  E78.00           IMPRESSION:  1) afib -Xarelto 20 mg daily   2) TAA - recheck CTA  3) HTN - controlled on carvedilol 25 mg twice daily spironolactone 25 mg daily, Lisinopril 20 mg twice daily. ALL ORDERS THIS ENCOUNTER  Orders Placed This Encounter    CTA CHEST W WO CONTRAST     Standing Status:   Future     Standing Expiration Date:   9/19/2024     Order Specific Question:   STAT Creatinine as needed:     Answer:   Yes        Follow up in 6 months. Thank you for allowing me to participate in this patient's care. Please call or contact me if there are any questions or concerns regarding the above.       Radha Daniels MD  09/19/23  11:36 AM

## 2023-09-20 ENCOUNTER — HOSPITAL ENCOUNTER (OUTPATIENT)
Dept: PHYSICAL THERAPY | Age: 73
Setting detail: RECURRING SERIES
Discharge: HOME | End: 2023-09-23
Attending: ORTHOPAEDIC SURGERY
Payer: MEDICARE

## 2023-09-20 DIAGNOSIS — R26.2 DIFFICULTY IN WALKING, NOT ELSEWHERE CLASSIFIED: Primary | ICD-10-CM

## 2023-09-20 DIAGNOSIS — M62.81 MUSCLE WEAKNESS (GENERALIZED): ICD-10-CM

## 2023-09-20 PROCEDURE — 97110 THERAPEUTIC EXERCISES: CPT

## 2023-09-20 PROCEDURE — 97162 PT EVAL MOD COMPLEX 30 MIN: CPT

## 2023-09-20 NOTE — PROGRESS NOTES
Live Bowman  : 1950  Primary: Medicare Part A And B (Medicare)  Secondary: Farshad Orozco @ [address] [place] [id number]  Phone: [phone]  Fax: [phone] Plan Frequency: 2 x week    Plan of Care/Certification Expiration Date: 23      >PT Visit Info:  Plan Frequency: 2 x week  Plan of Care/Certification Expiration Date: 23      Visit Count:  1    OUTPATIENT PHYSICAL THERAPY:OP NOTE TYPE: OP Note Type: Initial Assessment 2023       Episode  }Appt Desk             Medical/Referring Diagnosis:  Acute left ankle pain [M25.572]  Referring Physician:  Su Marmolejo MD MD Orders:  PT Eval and Treat   Date of Onset:  No data recorded   Allergies:   Patient has no known allergies. Restrictions/Precautions:  Restrictions/Precautions: Weight Bearing  Required Braces or Orthoses?: Yes  Left Lower Extremity Brace: Boot  Left Lower Extremity Weight Bearing: Weight Bearing As Tolerated     Interventions Planned (Treatment may consist of any combination of the following):    Current Treatment Recommendations: Strengthening; ROM; Balance training; Functional mobility training; Transfer training; Endurance training; Gait training; Stair training; Safety education & training;  Therapeutic activities     >Subjective Comments:     >Initial:      /10>Post Session:        /10  Medications Last Reviewed:  2023  Updated Objective Findings:  See Evaluation Note from today  Treatment     Live Bowman  : 1950  Primary: Medicare Part A And B (Medicare)  Secondary: Farshad Orozco @ [address] [place] [id number]  Phone: [phone]  Fax: [phone] Plan Frequency: 2 x week    Plan of Care/Certification Expiration Date: 23      >PT Visit Info:  Plan Frequency: 2 x week  Plan of Care/Certification Expiration Date:

## 2023-09-20 NOTE — THERAPY EVALUATION
April Record  : 1950  Primary: Medicare Part A And B (Medicare)  Secondary: Deja0 Ellie Orozco @ [address] [place] [id number]  Phone: [phone]  Fax: [phone] Plan Frequency: 2 x week    Plan of Care/Certification Expiration Date: 23      PT Visit Info:  Plan Frequency: 2 x week  Plan of Care/Certification Expiration Date: 23      Visit Count:  1                OUTPATIENT PHYSICAL THERAPY:             OP NOTE TYPE: Initial Assessment 2023               Episode (left ankle ORIF ) Appt Desk         Treatment Diagnosis:    Visit Diagnoses         Codes    Difficulty in walking, not elsewhere classified    -  Primary R26.2    Muscle weakness (generalized)     M62.81          Medical/Referring Diagnosis:  Acute left ankle pain [M25.572]  Referring Physician:  Gema Mcgowan MD MD Orders:  PT Eval and Treat   Return MD Appt:  11/15/23  Date of Onset:       Allergies:  Patient has no known allergies. Restrictions/Precautions:    Restrictions/Precautions: Weight Bearing  Required Braces or Orthoses?: Yes  Left Lower Extremity Brace: Boot  Left Lower Extremity Weight Bearing: Weight Bearing As Tolerated       Medications Last Reviewed:  2023     SUBJECTIVE   History of Injury/Illness (Reason for Referral): Ankle fracture from fall 23. Was walking on hardfloors with socks turned quickly and fell. ORIF 23. Was non weight bearing on 9/15/23. Has been wearing boot and using scooter until this week was told he could walk several steps with just the boot. Has been using walker in house or scooter. Was walking in his neighborhood 30 to 40 min about 3 times a week. No other exercises. He is retired. No active heart condtion.   Patient Stated Goal(s):  \"walk normally\"  Initial:     0 10 Post Session:     0 10  Past Medical History/Comorbidities:   Mr. Luisito Cho  has a past medical history of

## 2023-09-22 ENCOUNTER — HOSPITAL ENCOUNTER (OUTPATIENT)
Dept: PHYSICAL THERAPY | Age: 73
Setting detail: RECURRING SERIES
Discharge: HOME | End: 2023-09-25
Attending: ORTHOPAEDIC SURGERY
Payer: MEDICARE

## 2023-09-22 PROCEDURE — 97110 THERAPEUTIC EXERCISES: CPT

## 2023-09-25 ENCOUNTER — HOSPITAL ENCOUNTER (OUTPATIENT)
Dept: PHYSICAL THERAPY | Age: 73
Setting detail: RECURRING SERIES
Discharge: HOME | End: 2023-09-28
Attending: ORTHOPAEDIC SURGERY
Payer: MEDICARE

## 2023-09-25 PROCEDURE — 97110 THERAPEUTIC EXERCISES: CPT

## 2023-09-25 NOTE — PROGRESS NOTES
Care/Certification Expiration Date: 11/20/23      Visit Count:  3    OUTPATIENT PHYSICAL THERAPY:OP NOTE TYPE: OP Note Type: Treatment Note 9/25/2023       Episode  }Appt Desk             Medical/Referring Diagnosis:  Pain in left ankle and joints of left foot [M25.572]  Referring Physician:  Jasmeet Gonzalez MD MD Orders:  PT Eval and Treat   Date of Onset:  No data recorded   Allergies:   Patient has no known allergies. Restrictions/Precautions:  Restrictions/Precautions: Weight Bearing  Required Braces or Orthoses?: Yes  Left Lower Extremity Brace: Boot  Left Lower Extremity Weight Bearing: Weight Bearing As Tolerated     Interventions Planned (Treatment may consist of any combination of the following):    Current Treatment Recommendations: Strengthening; ROM; Balance training; Functional mobility training; Transfer training; Endurance training; Gait training; Stair training; Safety education & training; Therapeutic activities     >Subjective Comments: can walk 10 steps without walker per MD instructions. >Initial:      /10>Post Session:        /10  Medications Last Reviewed:  9/25/2023  Updated Objective Findings:  See Evaluation Note from today patient able to walk with rolling walker 100' WB as tolerated with boot in place good technique with instruction  Treatment     THERAPEUTIC EXERCISE: 38 minutes):    Exercises per grid below to improve mobility, strength, balance, and coordination. Progressed resistance and repetitions as indicated.      Date:  9/20/2023 Date:  9/22/23 Date:  9/25/23   Activity/Exercise Parameters Parameters Parameters   Edu Diagnosis, prognosis, POC, HEP, anatomy/physiology of condition       Scifit   L 7 hills 8 min  L 8 5 min   19 shubham   Toe curls heel slides in sitting 20 x ea 20 x ea Arch lifts 30 x    Sit to stand 10 x 18 inches  5 x 5 lbs  EMOM:   10 lbs 8 x   HR 77 O2    Walker instruction  100' standby supervision verbal cues  6 min walk 521 ft 6 min 641 ft   Standing at

## 2023-09-28 ENCOUNTER — HOSPITAL ENCOUNTER (OUTPATIENT)
Dept: PHYSICAL THERAPY | Age: 73
Setting detail: RECURRING SERIES
End: 2023-09-28
Attending: ORTHOPAEDIC SURGERY
Payer: MEDICARE

## 2023-09-28 PROCEDURE — 97110 THERAPEUTIC EXERCISES: CPT

## 2023-09-28 NOTE — PROGRESS NOTES
[facility]   10/24/2023 10:00 AM SFS CT 1 [facility]   10/26/2023 10:30 AM Alan Colon, ALEXIA [facility]   11/14/2023 10:40 AM Urban Gilford, MD POAI [facility]   12/1/2023  3:40 PM Jenn Flaherty MD [id number] [facility]   3/25/2024 11:00 AM Alis Magallon MD UCDG [facility]

## 2023-10-02 ENCOUNTER — HOSPITAL ENCOUNTER (OUTPATIENT)
Dept: PHYSICAL THERAPY | Age: 73
Setting detail: RECURRING SERIES
Discharge: HOME | End: 2023-10-05
Attending: ORTHOPAEDIC SURGERY
Payer: MEDICARE

## 2023-10-02 PROCEDURE — 97110 THERAPEUTIC EXERCISES: CPT

## 2023-10-02 NOTE — PROGRESS NOTES
Live Bowman  : 1950  Primary: Medicare Part A And B (Medicare)  Secondary: Farshad Orozco @ [address] [place] [id number]  Phone: [phone]  Fax: [phone] Plan Frequency: 2 x week    Plan of Care/Certification Expiration Date: 23      >PT Visit Info:  Plan Frequency: 2 x week  Plan of Care/Certification Expiration Date: 23      Visit Count:  5    OUTPATIENT PHYSICAL THERAPY:OP NOTE TYPE: OP Note Type: Initial Assessment 10/2/2023       Episode  }Appt Desk             Medical/Referring Diagnosis:  No admission diagnoses are documented for this encounter. Referring Physician:  Su Marmolejo MD MD Orders:  PT Eval and Treat   Date of Onset:  No data recorded   Allergies:   Patient has no known allergies. Restrictions/Precautions:  Restrictions/Precautions: Weight Bearing  Required Braces or Orthoses?: Yes  Left Lower Extremity Brace: Boot  Left Lower Extremity Weight Bearing: Weight Bearing As Tolerated     Interventions Planned (Treatment may consist of any combination of the following):    Current Treatment Recommendations: Strengthening; ROM; Balance training; Functional mobility training; Transfer training; Endurance training; Gait training; Stair training; Safety education & training;  Therapeutic activities     >Subjective Comments: no pain     >Initial:     0 /10>Post Session:        0/10  Medications Last Reviewed:  10/2/2023  Updated Objective Findings:  See Evaluation Note from today  Treatment     Live Bowman  : 1950  Primary: Medicare Part A And B (Medicare)  Secondary: Farshad Orozco @ [address] [place] [address]  Phone: [phone]  Fax: [phone] Plan Frequency: 2 x week    Plan of Care/Certification Expiration Date: 23      >PT Visit Info:  Plan Frequency: 2 x week  Plan of

## 2023-10-03 DIAGNOSIS — I10 HYPERTENSION, ESSENTIAL: ICD-10-CM

## 2023-10-03 DIAGNOSIS — R73.01 IMPAIRED FASTING GLUCOSE: ICD-10-CM

## 2023-10-03 DIAGNOSIS — E78.00 HYPERCHOLESTEROLEMIA: ICD-10-CM

## 2023-10-03 DIAGNOSIS — D53.9 MACROCYTIC ANEMIA: ICD-10-CM

## 2023-10-03 LAB
ALBUMIN SERPL-MCNC: 3.7 G/DL (ref 3.2–4.6)
ALBUMIN/GLOB SERPL: 1.2 (ref 0.4–1.6)
ALP SERPL-CCNC: 80 U/L (ref 50–136)
ALT SERPL-CCNC: 21 U/L (ref 12–65)
ANION GAP SERPL CALC-SCNC: 6 MMOL/L (ref 2–11)
AST SERPL-CCNC: 16 U/L (ref 15–37)
BASOPHILS # BLD: 0.1 K/UL (ref 0–0.2)
BASOPHILS NFR BLD: 1 % (ref 0–2)
BILIRUB SERPL-MCNC: 0.5 MG/DL (ref 0.2–1.1)
BUN SERPL-MCNC: 18 MG/DL (ref 8–23)
CALCIUM SERPL-MCNC: 8.8 MG/DL (ref 8.3–10.4)
CHLORIDE SERPL-SCNC: 109 MMOL/L (ref 101–110)
CHOLEST SERPL-MCNC: 113 MG/DL
CO2 SERPL-SCNC: 24 MMOL/L (ref 21–32)
CREAT SERPL-MCNC: 1.3 MG/DL (ref 0.8–1.5)
DIFFERENTIAL METHOD BLD: ABNORMAL
EOSINOPHIL # BLD: 0.1 K/UL (ref 0–0.8)
EOSINOPHIL NFR BLD: 2 % (ref 0.5–7.8)
ERYTHROCYTE [DISTWIDTH] IN BLOOD BY AUTOMATED COUNT: 13.8 % (ref 11.9–14.6)
EST. AVERAGE GLUCOSE BLD GHB EST-MCNC: 88 MG/DL
FOLATE SERPL-MCNC: 23 NG/ML (ref 3.1–17.5)
GLOBULIN SER CALC-MCNC: 3.1 G/DL (ref 2.8–4.5)
GLUCOSE SERPL-MCNC: 106 MG/DL (ref 65–100)
HBA1C MFR BLD: 4.7 % (ref 4.8–5.6)
HCT VFR BLD AUTO: 33.8 % (ref 41.1–50.3)
HDLC SERPL-MCNC: 48 MG/DL (ref 40–60)
HDLC SERPL: 2.4
HGB BLD-MCNC: 10.8 G/DL (ref 13.6–17.2)
IMM GRANULOCYTES # BLD AUTO: 0 K/UL (ref 0–0.5)
IMM GRANULOCYTES NFR BLD AUTO: 0 % (ref 0–5)
LDLC SERPL CALC-MCNC: 49.8 MG/DL
LYMPHOCYTES # BLD: 1.5 K/UL (ref 0.5–4.6)
LYMPHOCYTES NFR BLD: 29 % (ref 13–44)
MCH RBC QN AUTO: 30.7 PG (ref 26.1–32.9)
MCHC RBC AUTO-ENTMCNC: 32 G/DL (ref 31.4–35)
MCV RBC AUTO: 96 FL (ref 82–102)
MONOCYTES # BLD: 0.4 K/UL (ref 0.1–1.3)
MONOCYTES NFR BLD: 8 % (ref 4–12)
NEUTS SEG # BLD: 3 K/UL (ref 1.7–8.2)
NEUTS SEG NFR BLD: 60 % (ref 43–78)
NRBC # BLD: 0 K/UL (ref 0–0.2)
PLATELET # BLD AUTO: 182 K/UL (ref 150–450)
PMV BLD AUTO: 12.3 FL (ref 9.4–12.3)
POTASSIUM SERPL-SCNC: 4.6 MMOL/L (ref 3.5–5.1)
PROT SERPL-MCNC: 6.8 G/DL (ref 6.3–8.2)
RBC # BLD AUTO: 3.52 M/UL (ref 4.23–5.6)
SODIUM SERPL-SCNC: 139 MMOL/L (ref 133–143)
TRIGL SERPL-MCNC: 76 MG/DL (ref 35–150)
VIT B12 SERPL-MCNC: 579 PG/ML (ref 193–986)
VLDLC SERPL CALC-MCNC: 15.2 MG/DL (ref 6–23)
WBC # BLD AUTO: 5 K/UL (ref 4.3–11.1)

## 2023-10-05 ENCOUNTER — HOSPITAL ENCOUNTER (OUTPATIENT)
Dept: PHYSICAL THERAPY | Age: 73
Setting detail: RECURRING SERIES
Discharge: HOME | End: 2023-10-08
Attending: ORTHOPAEDIC SURGERY
Payer: MEDICARE

## 2023-10-05 PROCEDURE — 97110 THERAPEUTIC EXERCISES: CPT

## 2023-10-05 NOTE — PROGRESS NOTES
Maricarmen Civil  : 1950  Primary: Medicare Part A And B (Medicare)  Secondary: Farshad Orozco @ [address] [place] [address]  Phone: [phone]  Fax: [phone] Plan Frequency: 2 x week    Plan of Care/Certification Expiration Date: 23      >PT Visit Info:  Plan Frequency: 2 x week  Plan of Care/Certification Expiration Date: 23      Visit Count:  6    OUTPATIENT PHYSICAL THERAPY:OP NOTE TYPE: OP Note Type: Initial Assessment 10/5/2023       Episode  }Appt Desk             Medical/Referring Diagnosis:  Pain in left ankle and joints of left foot [M25.572]  Referring Physician:  Sher Hutchinson MD MD Orders:  PT Eval and Treat   Date of Onset:  No data recorded   Allergies:   Patient has no known allergies. Restrictions/Precautions:  Restrictions/Precautions: Weight Bearing  Required Braces or Orthoses?: Yes  Left Lower Extremity Brace: Boot  Left Lower Extremity Weight Bearing: Weight Bearing As Tolerated     Interventions Planned (Treatment may consist of any combination of the following):    Current Treatment Recommendations: Strengthening; ROM; Balance training; Functional mobility training; Transfer training; Endurance training; Gait training; Stair training; Safety education & training;  Therapeutic activities     >Subjective Comments: no pain     >Initial:     0 /10>Post Session:        0/10  Medications Last Reviewed:  10/5/2023  Updated Objective Findings:  See Evaluation Note from today  Treatment     Maricarmen Civil  : 1950  Primary: Medicare Part A And B (Medicare)  Secondary: Farshad Orozco @ [address] [place] [id number]  Phone: [phone]  Fax: [phone] Plan Frequency: 2 x week    Plan of Care/Certification Expiration Date: 23      >PT Visit Info:  Plan Frequency: 2 x week  Plan of

## 2023-10-09 ENCOUNTER — HOSPITAL ENCOUNTER (OUTPATIENT)
Dept: PHYSICAL THERAPY | Age: 73
Setting detail: RECURRING SERIES
Discharge: HOME | End: 2023-10-12
Attending: ORTHOPAEDIC SURGERY
Payer: MEDICARE

## 2023-10-09 PROCEDURE — 97110 THERAPEUTIC EXERCISES: CPT

## 2023-10-09 NOTE — PROGRESS NOTES
Jareth Tobar  : 1950  Primary: Medicare Part A And B (Medicare)  Secondary: [address] @ [facility] [address] [address]  Phone: [phone]  Fax: [phone] Plan Frequency: 2 x week    Plan of Care/Certification Expiration Date: 23      >PT Visit Info:  Plan Frequency: 2 x week  Plan of Care/Certification Expiration Date: 23      Visit Count:  7    OUTPATIENT PHYSICAL THERAPY:OP NOTE TYPE: OP Note Type: Initial Assessment 10/9/2023       Episode  }Appt Desk             Medical/Referring Diagnosis:  No admission diagnoses are documented for this encounter. Referring Physician:  Yamilex Wheeler MD MD Orders:  PT Eval and Treat   Date of Onset:  No data recorded   Allergies:   Patient has no known allergies. Restrictions/Precautions:  Restrictions/Precautions: Weight Bearing  Required Braces or Orthoses?: Yes  Left Lower Extremity Brace: Boot  Left Lower Extremity Weight Bearing: Weight Bearing As Tolerated     Interventions Planned (Treatment may consist of any combination of the following):    Current Treatment Recommendations: Strengthening; ROM; Balance training; Functional mobility training; Transfer training; Endurance training; Gait training; Stair training; Safety education & training;  Therapeutic activities     >Subjective Comments: no pain     >Initial:     0 /10>Post Session:        0/10  Medications Last Reviewed:  10/9/2023  Updated Objective Findings:  See Evaluation Note from today  Treatment     Jareth Tobar  : 1950  Primary: Medicare Part A And B (Medicare)  Secondary: [address] @ [facility] [address] [address]  Phone: [phone]  Fax: [phone] Plan Frequency: 2 x week    Plan of Care/Certification Expiration Date: 23      >PT Visit Info:  Plan Frequency: 2 x week  Plan of

## 2023-10-10 ENCOUNTER — OFFICE VISIT (OUTPATIENT)
Dept: INTERNAL MEDICINE CLINIC | Facility: CLINIC | Age: 73
End: 2023-10-10
Payer: MEDICARE

## 2023-10-10 VITALS
WEIGHT: 225 LBS | HEART RATE: 60 BPM | BODY MASS INDEX: 31.5 KG/M2 | DIASTOLIC BLOOD PRESSURE: 70 MMHG | SYSTOLIC BLOOD PRESSURE: 110 MMHG | HEIGHT: 71 IN

## 2023-10-10 DIAGNOSIS — K63.5 POLYP OF COLON, UNSPECIFIED PART OF COLON, UNSPECIFIED TYPE: ICD-10-CM

## 2023-10-10 DIAGNOSIS — R07.2 PRECORDIAL PAIN: ICD-10-CM

## 2023-10-10 DIAGNOSIS — N40.1 BPH ASSOCIATED WITH NOCTURIA: Primary | ICD-10-CM

## 2023-10-10 DIAGNOSIS — N18.31 STAGE 3A CHRONIC KIDNEY DISEASE (HCC): ICD-10-CM

## 2023-10-10 DIAGNOSIS — I48.0 PAF (PAROXYSMAL ATRIAL FIBRILLATION) (HCC): ICD-10-CM

## 2023-10-10 DIAGNOSIS — R73.01 IMPAIRED FASTING GLUCOSE: ICD-10-CM

## 2023-10-10 DIAGNOSIS — I10 HYPERTENSION, ESSENTIAL: ICD-10-CM

## 2023-10-10 DIAGNOSIS — Z23 ENCOUNTER FOR IMMUNIZATION: ICD-10-CM

## 2023-10-10 DIAGNOSIS — R35.1 BPH ASSOCIATED WITH NOCTURIA: Primary | ICD-10-CM

## 2023-10-10 DIAGNOSIS — D64.9 CHRONIC ANEMIA: ICD-10-CM

## 2023-10-10 DIAGNOSIS — R97.20 ELEVATED PSA: ICD-10-CM

## 2023-10-10 DIAGNOSIS — D68.69 SECONDARY HYPERCOAGULABLE STATE (HCC): ICD-10-CM

## 2023-10-10 DIAGNOSIS — E78.00 HYPERCHOLESTEROLEMIA: ICD-10-CM

## 2023-10-10 PROBLEM — I48.19 PERSISTENT ATRIAL FIBRILLATION (HCC): Status: RESOLVED | Noted: 2021-12-15 | Resolved: 2023-10-10

## 2023-10-10 PROCEDURE — G0008 ADMIN INFLUENZA VIRUS VAC: HCPCS | Performed by: INTERNAL MEDICINE

## 2023-10-10 PROCEDURE — G8417 CALC BMI ABV UP PARAM F/U: HCPCS | Performed by: INTERNAL MEDICINE

## 2023-10-10 PROCEDURE — G8428 CUR MEDS NOT DOCUMENT: HCPCS | Performed by: INTERNAL MEDICINE

## 2023-10-10 PROCEDURE — 3078F DIAST BP <80 MM HG: CPT | Performed by: INTERNAL MEDICINE

## 2023-10-10 PROCEDURE — 1036F TOBACCO NON-USER: CPT | Performed by: INTERNAL MEDICINE

## 2023-10-10 PROCEDURE — 1123F ACP DISCUSS/DSCN MKR DOCD: CPT | Performed by: INTERNAL MEDICINE

## 2023-10-10 PROCEDURE — 90694 VACC AIIV4 NO PRSRV 0.5ML IM: CPT | Performed by: INTERNAL MEDICINE

## 2023-10-10 PROCEDURE — G8484 FLU IMMUNIZE NO ADMIN: HCPCS | Performed by: INTERNAL MEDICINE

## 2023-10-10 PROCEDURE — 3017F COLORECTAL CA SCREEN DOC REV: CPT | Performed by: INTERNAL MEDICINE

## 2023-10-10 PROCEDURE — 3074F SYST BP LT 130 MM HG: CPT | Performed by: INTERNAL MEDICINE

## 2023-10-10 PROCEDURE — 99214 OFFICE O/P EST MOD 30 MIN: CPT | Performed by: INTERNAL MEDICINE

## 2023-10-10 ASSESSMENT — ENCOUNTER SYMPTOMS
VOICE CHANGE: 0
RECTAL PAIN: 0
EYE PAIN: 0
CHOKING: 0
STRIDOR: 0

## 2023-10-10 NOTE — PROGRESS NOTES
106 A1C 4.7  2/10/23     The patient was educated regarding \"prediabetes\" and the risk for progression over time to diabetes mellitus. We discussed strategies to prevent progression including dietary changes, exercise, and weight loss. Orders:  -     Hemoglobin A1C; Future  8. Chronic anemia  Overview:  10/3/23 hgb 10.8, mcv / remainder CBC normal.  B12 579, folate 23.    11/9/22 hgb 11.4, mcv 103.2, remainder CBC normal.  B12 270 (low normal), folate 3.8 (low normal), ferritin 249. Labs were reviewed and discussed with patient. He had macrocytic anemia with borderline / low normal B12 and folate. His macrocytosis improved with empiric B12 and folate therapy and his levels are now normal.... but he remains anemia. Ferritin was normal.  Possible anemia of chronic disease. Will follow for now  Refer to hematology for any worsening. Orders:  -     CBC with Auto Differential; Future  9. PAF (paroxysmal atrial fibrillation) ([facility])  Overview:  Continue carvedilol and Xarelto. Follow up with cardiology as scheduled. 10. Precordial pain  Overview:  1/15/21 exercise nuclear stress - 7 METS/88% MHR normal perfusion. 11. Secondary hypercoagulable state ([address])  Overview:  Due to atrial fibrillation. 12. Stage 3a chronic kidney disease ([address])  Overview:  10/3/23 creatinine 1.30, eGFR 58  2/10/23 creatinine 1.60, eGFR 45  11/9/22 creatinine 1.50, eGFR 49  3/25/22 creatinine 1.40, eGFR 53    Labs were reviewed and discussed with patient. The patient was advised to avoid NSAIDs and other nephrotoxins. Will dose adjust medications as appropriate. Will monitor labs over time. The patient and/or patient representative voiced understanding and agreement with the current diagnoses, recommendations, and possible side effects. Return in about 6 months (around 4/10/2024) for review labs, annual wellness.

## 2023-10-12 ENCOUNTER — HOSPITAL ENCOUNTER (OUTPATIENT)
Dept: PHYSICAL THERAPY | Age: 73
Setting detail: RECURRING SERIES
Discharge: HOME | End: 2023-10-15
Attending: ORTHOPAEDIC SURGERY
Payer: MEDICARE

## 2023-10-12 PROCEDURE — 97110 THERAPEUTIC EXERCISES: CPT

## 2023-10-12 NOTE — PROGRESS NOTES
instruction       walking 400' outside on sidewalk      Standing at bar left hip abduction, flexion, ext 20 x band at ankle + marching 20 x ea direction     SLR      Heel raises sitting 25 lbs 2 x 20 20 x 25 lbs    Small BAPs board  2 min  3 min     Tib raise  2.5 lbs 20 x    Band EV  30 x red         THERAPEUTIC ACTIVITY: ( 0 minutes): Therapeutic activities per grid below to improve mobility, strength, coordination, and dynamic movement to improve functional lifting, carrying, reaching, catching, and overhead activities. Date:  10/12/2023 Date:   Date:     Activity/Exercise Parameters Parameters Parameters                                                   HEP Log Date        2.     3.    4.     5.        POC    Recertification Expiration Date      Plan of Care/Certification Expiration Date: 11/20/23     Visit Count  8    Number of Allowed Visits              Treatment/Session Summary:      Treatment Assessment:     Ready to progress to balance activities and increased gait speed   Communication/Consultation:       None today   Equipment provided none   Recommendations/Intent for next  treatment session:  Next visit will focus on range of motion, functional status.           >Total Treatment Billable Duration: 40 minutes  Time In: 1045  Time Out: 1130    Houston Colon, PT       Charge Capture  }Post Session Pain  PT Visit Info  [facility] Portal  MD Guidelines  Scanned Media  Benefits  MyChart    Future Appointments   Date Time Provider [address]   10/16/2023 10:30 AM Houston Colon, PT [facility]   10/19/2023 10:30 AM Houston Colon, PT [facility]   10/23/2023 10:30 AM Houston Colon, PT [facility]   10/24/2023 10:00 AM SFS CT 1 SFSRCT [facility]   10/26/2023 10:30 AM Houston Colon, PT [facility]   11/14/2023 10:40 AM Christiana Gonsales MD POAI [facility]   12/1/2023  3:40 PM Seble Deleon MD [id number] [facility] AMB   3/25/2024 11:00 AM Andre Mack MD UCDG [facility] AM[facility]   4/10/2024  8:20 AM

## 2023-10-16 ENCOUNTER — APPOINTMENT (OUTPATIENT)
Dept: PHYSICAL THERAPY | Age: 73
End: 2023-10-16
Attending: ORTHOPAEDIC SURGERY
Payer: MEDICARE

## 2023-10-19 ENCOUNTER — HOSPITAL ENCOUNTER (OUTPATIENT)
Dept: PHYSICAL THERAPY | Age: 73
Setting detail: RECURRING SERIES
Discharge: HOME | End: 2023-10-22
Attending: ORTHOPAEDIC SURGERY
Payer: MEDICARE

## 2023-10-19 PROCEDURE — 97110 THERAPEUTIC EXERCISES: CPT

## 2023-10-19 NOTE — PROGRESS NOTES
x 4 sets   HR 85 15 lbs 5 x  23 lbs EMOM 8 x 4 sets   HR 85   Dead lift  15 lbs 5 x   18 lbs 5 x   20 lbs 3 x 5  HR 77 23lbs 4 x 5 25 lbs 5 x   30 lbs 3 x 5   HR 80 Rack pull 15 lbs 2 x 10      Step ups     6 up 6 step one after other   8\" 10 x ea side no hands    walking 400' outside on sidewalk    1190 ft  no cane    Standing at bar left hip abduction, flexion, ext 20 x band at ankle + marching 20 x ea direction      SLR       Heel raises sitting 25 lbs 2 x 20 20 x 25 lbs     Small BAPs board  2 min  3 min      Tib raise  2.5 lbs 20 x     Band EV  30 x red          THERAPEUTIC ACTIVITY: ( 0 minutes): Therapeutic activities per grid below to improve mobility, strength, coordination, and dynamic movement to improve functional lifting, carrying, reaching, catching, and overhead activities. Date:  10/19/2023 Date:   Date:     Activity/Exercise Parameters Parameters Parameters                                                   HEP Log Date        2.     3.    4.     5.        POC    Recertification Expiration Date      Plan of Care/Certification Expiration Date: 11/20/23     Visit Count  9    Number of Allowed Visits              Treatment/Session Summary:      Treatment Assessment:     Treatment modified due to back soreness   Communication/Consultation:       None today   Equipment provided none   Recommendations/Intent for next  treatment session:  Next visit will focus on range of motion, functional status.           >Total Treatment Billable Duration: 40 minutes  Time In: 1030  Time Out: 1115    Jamaal Colon, PT       Charge Capture  }Post Session Pain  PT Visit Info  [employer] Portal  MD Guidelines  Scanned Media  Benefits  MyChart    Future Appointments   Date Time Provider [address]   10/23/2023 10:30 AM Jamaal Colon, PT [facility]   10/24/2023 10:00 AM SFS CT 1 SF[facility]CT [facility]   10/26/2023 10:30 AM Jamaal Colon, PT [facility]   11/14/2023 10:40 AM MD ARDEN King AMB

## 2023-10-23 ENCOUNTER — HOSPITAL ENCOUNTER (OUTPATIENT)
Dept: PHYSICAL THERAPY | Age: 73
Setting detail: RECURRING SERIES
Discharge: HOME | End: 2023-10-26
Attending: ORTHOPAEDIC SURGERY
Payer: MEDICARE

## 2023-10-23 PROCEDURE — 97110 THERAPEUTIC EXERCISES: CPT

## 2023-10-23 NOTE — PROGRESS NOTES
Ale Milan  : 1950  Primary: Medicare Part A And B (Medicare)  Secondary: Farshad Orozco @ [address] [place] [address]  Phone: [phone]  Fax: [phone] Plan Frequency: 2 x week    Plan of Care/Certification Expiration Date: 23      >PT Visit Info:  Plan Frequency: 2 x week  Plan of Care/Certification Expiration Date: 23      Visit Count:  10    OUTPATIENT PHYSICAL THERAPY:OP NOTE TYPE: OP Note Type: Initial Assessment 10/23/2023       Episode  }Appt Desk             Medical/Referring Diagnosis:  No admission diagnoses are documented for this encounter. Referring Physician:  Solange Guzman MD MD Orders:  PT Eval and Treat   Date of Onset:  No data recorded   Allergies:   Patient has no known allergies. Restrictions/Precautions:  Restrictions/Precautions: Weight Bearing  Required Braces or Orthoses?: Yes  Left Lower Extremity Brace: Boot  Left Lower Extremity Weight Bearing: Weight Bearing As Tolerated     Interventions Planned (Treatment may consist of any combination of the following):    Current Treatment Recommendations: Strengthening; ROM; Balance training; Functional mobility training; Transfer training; Endurance training; Gait training; Stair training; Safety education & training; Therapeutic activities     >Subjective Comments: no pain     >Initial:     0 /10>Post Session:        0/10  Medications Last Reviewed:  10/23/2023  Updated Objective Findings: DC boot progressed to lace up brace with regular shoe. Safe gait with cane.    Treatment     Ale Milan  : 1950  Primary: Medicare Part A And B (Medicare)  Secondary: Farshad Orozco @ [address] [place] [address]  Phone: [phone]  Fax: [phone] Plan Frequency: 2 x week    Plan of Care/Certification Expiration Date: 23      >PT

## 2023-10-24 ENCOUNTER — TELEPHONE (OUTPATIENT)
Age: 73
End: 2023-10-24

## 2023-10-24 ENCOUNTER — HOSPITAL ENCOUNTER (OUTPATIENT)
Dept: CT IMAGING | Age: 73
Discharge: HOME | End: 2023-10-26
Attending: INTERNAL MEDICINE
Payer: MEDICARE

## 2023-10-24 DIAGNOSIS — I71.21 ANEURYSM OF ASCENDING AORTA WITHOUT RUPTURE (HCC): ICD-10-CM

## 2023-10-24 PROCEDURE — 71275 CT ANGIOGRAPHY CHEST: CPT

## 2023-10-24 PROCEDURE — 6360000004 HC RX CONTRAST MEDICATION: Performed by: INTERNAL MEDICINE

## 2023-10-24 RX ADMIN — IOPAMIDOL 100 ML: 755 INJECTION, SOLUTION INTRAVENOUS at 10:00

## 2023-10-24 NOTE — TELEPHONE ENCOUNTER
----- Message from Emanuel Charles MD sent at 10/24/2023 12:02 PM EDT -----  Ct chest shows a stable aorta no bigger than previous checks.     Stone Frias MD

## 2023-10-26 ENCOUNTER — HOSPITAL ENCOUNTER (OUTPATIENT)
Dept: PHYSICAL THERAPY | Age: 73
Setting detail: RECURRING SERIES
Discharge: HOME | End: 2023-10-29
Attending: ORTHOPAEDIC SURGERY
Payer: MEDICARE

## 2023-10-26 PROCEDURE — 97110 THERAPEUTIC EXERCISES: CPT

## 2023-10-26 NOTE — PROGRESS NOTES
Trenton Barillas  : 1950  Primary: Medicare Part A And B (Medicare)  Secondary: Farshad Orozco @ [address] [place] [id number]  Phone: [phone]  Fax: [phone] Plan Frequency: 2 x week    Plan of Care/Certification Expiration Date: 23      >PT Visit Info:  Plan Frequency: 2 x week  Plan of Care/Certification Expiration Date: 23      Visit Count:  11    OUTPATIENT PHYSICAL THERAPY:OP NOTE TYPE: OP Note Type: Initial Assessment 10/26/2023       Episode  }Appt Desk             Medical/Referring Diagnosis:  No admission diagnoses are documented for this encounter. Referring Physician:  Benton Castro MD MD Orders:  PT Eval and Treat   Date of Onset:  No data recorded   Allergies:   Patient has no known allergies. Restrictions/Precautions:  Restrictions/Precautions: Weight Bearing  Required Braces or Orthoses?: Yes  Left Lower Extremity Brace: Boot  Left Lower Extremity Weight Bearing: Weight Bearing As Tolerated     Interventions Planned (Treatment may consist of any combination of the following):    Current Treatment Recommendations: Strengthening; ROM; Balance training; Functional mobility training; Transfer training; Endurance training; Gait training; Stair training; Safety education & training; Therapeutic activities     >Subjective Comments: no pain     >Initial:     0 /10>Post Session:        0/10  Medications Last Reviewed:  10/26/2023  Updated Objective Findings: DC boot progressed to lace up brace with regular shoe. Safe gait with cane.    Treatment     Trenton Barillas  : 1950  Primary: Medicare Part A And B (Medicare)  Secondary: Farshad Orozco @ [address] [place] [address]  Phone: [phone]  Fax: [phone] Plan Frequency: 2 x week    Plan of Care/Certification Expiration Date: 23      >PT

## 2023-11-11 DIAGNOSIS — D53.9 MACROCYTIC ANEMIA: ICD-10-CM

## 2023-11-13 RX ORDER — FOLIC ACID 1 MG/1
TABLET ORAL
Qty: 90 TABLET | Refills: 1 | Status: SHIPPED | OUTPATIENT
Start: 2023-11-13

## 2023-11-14 ENCOUNTER — OFFICE VISIT (OUTPATIENT)
Dept: ORTHOPEDIC SURGERY | Age: 73
End: 2023-11-14

## 2023-11-14 DIAGNOSIS — M25.572 ACUTE LEFT ANKLE PAIN: Primary | ICD-10-CM

## 2023-11-14 NOTE — PROGRESS NOTES
Name: Live Bowman  YOB: 1950  Gender: male  MRN: [id number]      Procedure: Left Ankle Open Reduction Internal Fixation - Left  Surgery Date: 7/31/2023    Subjective: Denies fevers chills or infection. Denies any signs of spreading erythema. Describes some irritation and dry skin. Has been difficult for him to get off of his foot but he is using the scooter and has been nonweightbearing. 11/14/2023-patient doing well. He denies any significant pain. He does note some soreness when using the clutch on his sports car. His wound has completely healed. He has been using the ASO brace. ROS: Patient Denies fever/chills, headache, visual changes, chest pain, shortness of breath, and nausea/vomiting/diarrhea     Exam:   Gen: AAOx3 NAD  Resp: CTAB - no wheezing  Card: RRR  Eyes: sclera non icteric    Cast/Splint: Intact  Wound healing appropriately. No signs of dehiscence or infection. No signs of erythema or drainage. With toes warm and well-perfused. All the sutures were removed. The central aspect of the wound has scabbed over and is not completely healed. However it is not open it is a thick scab at the central aspect of the wound. ROM decreased at ankle due to stiffness. Some mild pain w/ ankle ROM. However AT/PT/GS/Peroneals/EHL/FHL/EDL/FDL intact. Strength and ROM limited by stiffness. normal SILT to s/s/sp/dp/t. Imaging:   I independently interpreted XR taken today and   X-Ray LEFT Ankle 3 vw (AP/Lateral/Oblique) for ankle pain   Findings: Fixation intact. No signs of fixation loss. Fibula, syndesmosis and medial clear space are aligned well. Broken syndesmosis screw. Tibiotalar joint remains intact. Impression: Stable postoperative fixation   Signature: Gregoria Francisco MD         Assessment/Plan:    Patient doing well. His wound has healed. He will transition out of the ASO brace as tolerated.     Weight-bearing status: WBAT      Return to

## 2023-11-27 DIAGNOSIS — R97.20 ELEVATED PSA: ICD-10-CM

## 2023-11-27 LAB — PSA SERPL-MCNC: 6.5 NG/ML

## 2023-12-01 ENCOUNTER — OFFICE VISIT (OUTPATIENT)
Dept: UROLOGY | Age: 73
End: 2023-12-01
Payer: MEDICARE

## 2023-12-01 DIAGNOSIS — R97.20 ELEVATED PSA: ICD-10-CM

## 2023-12-01 DIAGNOSIS — R35.1 BPH ASSOCIATED WITH NOCTURIA: Primary | ICD-10-CM

## 2023-12-01 DIAGNOSIS — N40.1 BPH ASSOCIATED WITH NOCTURIA: Primary | ICD-10-CM

## 2023-12-01 LAB
BILIRUBIN, URINE, POC: NEGATIVE
BLOOD URINE, POC: NORMAL
GLUCOSE URINE, POC: NEGATIVE
KETONES, URINE, POC: NEGATIVE
LEUKOCYTE ESTERASE, URINE, POC: NEGATIVE
NITRITE, URINE, POC: NEGATIVE
PH, URINE, POC: 5.5 (ref 4.6–8)
PROTEIN,URINE, POC: 30
SPECIFIC GRAVITY, URINE, POC: 1.02 (ref 1–1.03)
URINALYSIS CLARITY, POC: NORMAL
URINALYSIS COLOR, POC: NORMAL
UROBILINOGEN, POC: NORMAL

## 2023-12-01 PROCEDURE — G8484 FLU IMMUNIZE NO ADMIN: HCPCS | Performed by: UROLOGY

## 2023-12-01 PROCEDURE — 81003 URINALYSIS AUTO W/O SCOPE: CPT | Performed by: UROLOGY

## 2023-12-01 PROCEDURE — G8427 DOCREV CUR MEDS BY ELIG CLIN: HCPCS | Performed by: UROLOGY

## 2023-12-01 PROCEDURE — 3017F COLORECTAL CA SCREEN DOC REV: CPT | Performed by: UROLOGY

## 2023-12-01 PROCEDURE — G8417 CALC BMI ABV UP PARAM F/U: HCPCS | Performed by: UROLOGY

## 2023-12-01 PROCEDURE — 1123F ACP DISCUSS/DSCN MKR DOCD: CPT | Performed by: UROLOGY

## 2023-12-01 PROCEDURE — 1036F TOBACCO NON-USER: CPT | Performed by: UROLOGY

## 2023-12-01 PROCEDURE — 99213 OFFICE O/P EST LOW 20 MIN: CPT | Performed by: UROLOGY

## 2023-12-01 ASSESSMENT — ENCOUNTER SYMPTOMS
NAUSEA: 0
BACK PAIN: 0

## 2024-02-08 DIAGNOSIS — N40.1 BENIGN PROSTATIC HYPERPLASIA WITH LOWER URINARY TRACT SYMPTOMS: ICD-10-CM

## 2024-02-08 RX ORDER — TAMSULOSIN HYDROCHLORIDE 0.4 MG/1
0.4 CAPSULE ORAL DAILY
Qty: 90 CAPSULE | Refills: 2 | Status: SHIPPED | OUTPATIENT
Start: 2024-02-08

## 2024-02-08 NOTE — TELEPHONE ENCOUNTER
Requested Prescriptions     Pending Prescriptions Disp Refills    tamsulosin (FLOMAX) 0.4 MG capsule       Sig: Take by mouth daily     Dose verified and to [facility]. Patient is scheduled for follow up visit.

## 2024-03-25 ENCOUNTER — OFFICE VISIT (OUTPATIENT)
Age: 74
End: 2024-03-25
Payer: MEDICARE

## 2024-03-25 VITALS
WEIGHT: 237 LBS | BODY MASS INDEX: 33.18 KG/M2 | SYSTOLIC BLOOD PRESSURE: 136 MMHG | DIASTOLIC BLOOD PRESSURE: 82 MMHG | HEIGHT: 71 IN | HEART RATE: 52 BPM

## 2024-03-25 DIAGNOSIS — I49.3 PVC (PREMATURE VENTRICULAR CONTRACTION): ICD-10-CM

## 2024-03-25 DIAGNOSIS — I10 HYPERTENSION, ESSENTIAL: Primary | ICD-10-CM

## 2024-03-25 DIAGNOSIS — I71.21 ANEURYSM OF ASCENDING AORTA WITHOUT RUPTURE (HCC): ICD-10-CM

## 2024-03-25 DIAGNOSIS — E78.00 HYPERCHOLESTEROLEMIA: ICD-10-CM

## 2024-03-25 DIAGNOSIS — I48.0 PAF (PAROXYSMAL ATRIAL FIBRILLATION) (HCC): ICD-10-CM

## 2024-03-25 PROCEDURE — 1123F ACP DISCUSS/DSCN MKR DOCD: CPT | Performed by: INTERNAL MEDICINE

## 2024-03-25 PROCEDURE — 3075F SYST BP GE 130 - 139MM HG: CPT | Performed by: INTERNAL MEDICINE

## 2024-03-25 PROCEDURE — G8417 CALC BMI ABV UP PARAM F/U: HCPCS | Performed by: INTERNAL MEDICINE

## 2024-03-25 PROCEDURE — 93000 ELECTROCARDIOGRAM COMPLETE: CPT | Performed by: INTERNAL MEDICINE

## 2024-03-25 PROCEDURE — 3017F COLORECTAL CA SCREEN DOC REV: CPT | Performed by: INTERNAL MEDICINE

## 2024-03-25 PROCEDURE — 3079F DIAST BP 80-89 MM HG: CPT | Performed by: INTERNAL MEDICINE

## 2024-03-25 PROCEDURE — 99214 OFFICE O/P EST MOD 30 MIN: CPT | Performed by: INTERNAL MEDICINE

## 2024-03-25 PROCEDURE — G8484 FLU IMMUNIZE NO ADMIN: HCPCS | Performed by: INTERNAL MEDICINE

## 2024-03-25 PROCEDURE — G8427 DOCREV CUR MEDS BY ELIG CLIN: HCPCS | Performed by: INTERNAL MEDICINE

## 2024-03-25 PROCEDURE — 1036F TOBACCO NON-USER: CPT | Performed by: INTERNAL MEDICINE

## 2024-03-25 ASSESSMENT — ENCOUNTER SYMPTOMS
RESPIRATORY NEGATIVE: 1
PHOTOPHOBIA: 0
EYE PAIN: 0
GASTROINTESTINAL NEGATIVE: 1
BACK PAIN: 0
CHEST TIGHTNESS: 0
ALLERGIC/IMMUNOLOGIC NEGATIVE: 1
SHORTNESS OF BREATH: 0
ABDOMINAL PAIN: 0
EYES NEGATIVE: 1

## 2024-03-25 NOTE — PROGRESS NOTES
and heat intolerance.   Genitourinary: Negative.  Negative for dysuria.   Musculoskeletal: Negative.  Negative for back pain and joint swelling.   Skin: Negative.  Negative for rash.   Allergic/Immunologic: Negative.  Negative for immunocompromised state.   Neurological: Negative.  Negative for dizziness, syncope and light-headedness.   Hematological: Negative.  Does not bruise/bleed easily.   Psychiatric/Behavioral: Negative.  Negative for suicidal ideas.            PHYSICAL EXAM:  Physical Exam  Constitutional:       General: He is not in acute distress.     Appearance: He is not ill-appearing.   HENT:      Head: Normocephalic and atraumatic.      Nose: No congestion.      Mouth/Throat:      Mouth: Mucous membranes are moist.   Eyes:      Extraocular Movements: Extraocular movements intact.      Pupils: Pupils are equal, round, and reactive to light.   Cardiovascular:      Rate and Rhythm: Normal rate and regular rhythm.      Heart sounds: No murmur heard.     No friction rub. No gallop.   Pulmonary:      Effort: No respiratory distress.      Breath sounds: No wheezing or rhonchi.   Musculoskeletal:         General: No swelling.      Cervical back: Normal range of motion.      Right lower leg: No edema.      Left lower leg: No edema.   Skin:     General: Skin is warm and dry.      Findings: No rash.   Neurological:      General: No focal deficit present.      Mental Status: He is oriented to person, place, and time.   Psychiatric:         Mood and Affect: Mood normal.         Behavior: Behavior normal.         Judgment: Judgment normal.          /82   Pulse 52   Ht 1.803 m (5' 11\")   Wt 107.5 kg (237 lb)   BMI 33.05 kg/m²      Wt Readings from Last 10 Encounters:   03/25/24 107.5 kg (237 lb)   10/10/23 102.1 kg (225 lb)   09/19/23 105.2 kg (232 lb)   07/31/23 105.2 kg (232 lb)   07/19/23 105.2 kg (232 lb)   03/30/23 108.9 kg (240 lb)   02/17/23 108 kg (238 lb)   11/17/22 106.8 kg (235 lb 6.4 oz)

## 2024-03-28 ENCOUNTER — TELEPHONE (OUTPATIENT)
Age: 74
End: 2024-03-28

## 2024-03-28 NOTE — TELEPHONE ENCOUNTER
----- Message from Ignacio Poon MD sent at 3/27/2024  1:17 PM EDT -----  Please call mr Lio and let him know that I have discussed with Dr Peñaloza and he feels we need to continue blood thinner indefinitely.    Ignacio Poon MD    ----- Message -----  From: Nolan Peñaloza MD  Sent: 3/26/2024   2:35 PM EDT  To: Ignacio Poon MD    Would base it on his CHADSVasc whick looks to be ~4, so he needs to stay on OAC, but happy to see him to discuss Watchman?    ----- Message -----  From: Ignacio Poon MD  Sent: 3/25/2024  11:06 AM EDT  To: Nolan Peñaloza MD    Sandra Vaca had an afib ablation in 3/22 and remains on xarelto. Has some bleeding hemorrhoids and wants to come off what are your thoughts.  Has had some monitors since then that didn't show afib.    DRG

## 2024-04-02 DIAGNOSIS — I10 HYPERTENSION, ESSENTIAL: ICD-10-CM

## 2024-04-02 DIAGNOSIS — R73.01 IMPAIRED FASTING GLUCOSE: ICD-10-CM

## 2024-04-02 DIAGNOSIS — D64.9 CHRONIC ANEMIA: ICD-10-CM

## 2024-04-02 LAB
ALBUMIN SERPL-MCNC: 3.5 G/DL (ref 3.2–4.6)
ALBUMIN/GLOB SERPL: 1.1 (ref 0.4–1.6)
ALP SERPL-CCNC: 67 U/L (ref 50–136)
ALT SERPL-CCNC: 28 U/L (ref 12–65)
ANION GAP SERPL CALC-SCNC: 4 MMOL/L (ref 2–11)
AST SERPL-CCNC: 19 U/L (ref 15–37)
BASOPHILS # BLD: 0.1 K/UL (ref 0–0.2)
BASOPHILS NFR BLD: 1 % (ref 0–2)
BILIRUB SERPL-MCNC: 0.5 MG/DL (ref 0.2–1.1)
BUN SERPL-MCNC: 17 MG/DL (ref 8–23)
CALCIUM SERPL-MCNC: 8.8 MG/DL (ref 8.3–10.4)
CHLORIDE SERPL-SCNC: 110 MMOL/L (ref 103–113)
CO2 SERPL-SCNC: 25 MMOL/L (ref 21–32)
CREAT SERPL-MCNC: 1.2 MG/DL (ref 0.8–1.5)
DIFFERENTIAL METHOD BLD: ABNORMAL
EOSINOPHIL # BLD: 0.1 K/UL (ref 0–0.8)
EOSINOPHIL NFR BLD: 3 % (ref 0.5–7.8)
ERYTHROCYTE [DISTWIDTH] IN BLOOD BY AUTOMATED COUNT: 13.7 % (ref 11.9–14.6)
EST. AVERAGE GLUCOSE BLD GHB EST-MCNC: 91 MG/DL
GLOBULIN SER CALC-MCNC: 3.1 G/DL (ref 2.8–4.5)
GLUCOSE SERPL-MCNC: 105 MG/DL (ref 65–100)
HBA1C MFR BLD: 4.8 % (ref 4.8–5.6)
HCT VFR BLD AUTO: 34.6 % (ref 41.1–50.3)
HGB BLD-MCNC: 11.3 G/DL (ref 13.6–17.2)
IMM GRANULOCYTES # BLD AUTO: 0 K/UL (ref 0–0.5)
IMM GRANULOCYTES NFR BLD AUTO: 0 % (ref 0–5)
LYMPHOCYTES # BLD: 1.7 K/UL (ref 0.5–4.6)
LYMPHOCYTES NFR BLD: 30 % (ref 13–44)
MCH RBC QN AUTO: 32.6 PG (ref 26.1–32.9)
MCHC RBC AUTO-ENTMCNC: 32.7 G/DL (ref 31.4–35)
MCV RBC AUTO: 99.7 FL (ref 82–102)
MONOCYTES # BLD: 0.4 K/UL (ref 0.1–1.3)
MONOCYTES NFR BLD: 8 % (ref 4–12)
NEUTS SEG # BLD: 3.2 K/UL (ref 1.7–8.2)
NEUTS SEG NFR BLD: 58 % (ref 43–78)
NRBC # BLD: 0 K/UL (ref 0–0.2)
PLATELET # BLD AUTO: 161 K/UL (ref 150–450)
PMV BLD AUTO: 11.8 FL (ref 9.4–12.3)
POTASSIUM SERPL-SCNC: 4.3 MMOL/L (ref 3.5–5.1)
PROT SERPL-MCNC: 6.6 G/DL (ref 6.3–8.2)
RBC # BLD AUTO: 3.47 M/UL (ref 4.23–5.6)
SODIUM SERPL-SCNC: 139 MMOL/L (ref 136–146)
WBC # BLD AUTO: 5.6 K/UL (ref 4.3–11.1)

## 2024-04-07 SDOH — ECONOMIC STABILITY: TRANSPORTATION INSECURITY
IN THE PAST 12 MONTHS, HAS LACK OF TRANSPORTATION KEPT YOU FROM MEETINGS, WORK, OR FROM GETTING THINGS NEEDED FOR DAILY LIVING?: NO

## 2024-04-07 SDOH — ECONOMIC STABILITY: FOOD INSECURITY: WITHIN THE PAST 12 MONTHS, THE FOOD YOU BOUGHT JUST DIDN'T LAST AND YOU DIDN'T HAVE MONEY TO GET MORE.: NEVER TRUE

## 2024-04-07 SDOH — ECONOMIC STABILITY: INCOME INSECURITY: HOW HARD IS IT FOR YOU TO PAY FOR THE VERY BASICS LIKE FOOD, HOUSING, MEDICAL CARE, AND HEATING?: NOT VERY HARD

## 2024-04-07 SDOH — ECONOMIC STABILITY: FOOD INSECURITY: WITHIN THE PAST 12 MONTHS, YOU WORRIED THAT YOUR FOOD WOULD RUN OUT BEFORE YOU GOT MONEY TO BUY MORE.: NEVER TRUE

## 2024-04-07 ASSESSMENT — PATIENT HEALTH QUESTIONNAIRE - PHQ9
SUM OF ALL RESPONSES TO PHQ QUESTIONS 1-9: 0
SUM OF ALL RESPONSES TO PHQ QUESTIONS 1-9: 0
1. LITTLE INTEREST OR PLEASURE IN DOING THINGS: NOT AT ALL
SUM OF ALL RESPONSES TO PHQ QUESTIONS 1-9: 0
SUM OF ALL RESPONSES TO PHQ QUESTIONS 1-9: 0
SUM OF ALL RESPONSES TO PHQ9 QUESTIONS 1 & 2: 0
SUM OF ALL RESPONSES TO PHQ9 QUESTIONS 1 & 2: 0
2. FEELING DOWN, DEPRESSED OR HOPELESS: NOT AT ALL
2. FEELING DOWN, DEPRESSED OR HOPELESS: NOT AT ALL
1. LITTLE INTEREST OR PLEASURE IN DOING THINGS: NOT AT ALL

## 2024-04-10 ENCOUNTER — OFFICE VISIT (OUTPATIENT)
Dept: INTERNAL MEDICINE CLINIC | Facility: CLINIC | Age: 74
End: 2024-04-10
Payer: MEDICARE

## 2024-04-10 VITALS
HEART RATE: 62 BPM | HEIGHT: 71 IN | SYSTOLIC BLOOD PRESSURE: 132 MMHG | BODY MASS INDEX: 32.68 KG/M2 | DIASTOLIC BLOOD PRESSURE: 76 MMHG | WEIGHT: 233.4 LBS

## 2024-04-10 DIAGNOSIS — R35.1 BPH ASSOCIATED WITH NOCTURIA: ICD-10-CM

## 2024-04-10 DIAGNOSIS — I48.0 PAF (PAROXYSMAL ATRIAL FIBRILLATION) (HCC): ICD-10-CM

## 2024-04-10 DIAGNOSIS — E78.00 HYPERCHOLESTEROLEMIA: ICD-10-CM

## 2024-04-10 DIAGNOSIS — R73.01 IMPAIRED FASTING GLUCOSE: ICD-10-CM

## 2024-04-10 DIAGNOSIS — N40.1 BPH ASSOCIATED WITH NOCTURIA: ICD-10-CM

## 2024-04-10 DIAGNOSIS — Z00.00 MEDICARE ANNUAL WELLNESS VISIT, SUBSEQUENT: Primary | Chronic | ICD-10-CM

## 2024-04-10 DIAGNOSIS — D68.69 SECONDARY HYPERCOAGULABLE STATE (HCC): ICD-10-CM

## 2024-04-10 DIAGNOSIS — I10 HYPERTENSION, ESSENTIAL: ICD-10-CM

## 2024-04-10 DIAGNOSIS — Z91.81 AT HIGH RISK FOR FALLS: ICD-10-CM

## 2024-04-10 DIAGNOSIS — I71.21 ANEURYSM OF ASCENDING AORTA WITHOUT RUPTURE (HCC): ICD-10-CM

## 2024-04-10 DIAGNOSIS — D64.9 CHRONIC ANEMIA: ICD-10-CM

## 2024-04-10 DIAGNOSIS — K63.5 POLYP OF COLON, UNSPECIFIED PART OF COLON, UNSPECIFIED TYPE: ICD-10-CM

## 2024-04-10 DIAGNOSIS — E66.9 OBESITY (BMI 30-39.9): ICD-10-CM

## 2024-04-10 DIAGNOSIS — N18.31 STAGE 3A CHRONIC KIDNEY DISEASE (HCC): ICD-10-CM

## 2024-04-10 PROCEDURE — 3017F COLORECTAL CA SCREEN DOC REV: CPT | Performed by: INTERNAL MEDICINE

## 2024-04-10 PROCEDURE — 99214 OFFICE O/P EST MOD 30 MIN: CPT | Performed by: INTERNAL MEDICINE

## 2024-04-10 PROCEDURE — G8428 CUR MEDS NOT DOCUMENT: HCPCS | Performed by: INTERNAL MEDICINE

## 2024-04-10 PROCEDURE — 1123F ACP DISCUSS/DSCN MKR DOCD: CPT | Performed by: INTERNAL MEDICINE

## 2024-04-10 PROCEDURE — G0439 PPPS, SUBSEQ VISIT: HCPCS | Performed by: INTERNAL MEDICINE

## 2024-04-10 PROCEDURE — 1036F TOBACCO NON-USER: CPT | Performed by: INTERNAL MEDICINE

## 2024-04-10 PROCEDURE — 3075F SYST BP GE 130 - 139MM HG: CPT | Performed by: INTERNAL MEDICINE

## 2024-04-10 PROCEDURE — 3078F DIAST BP <80 MM HG: CPT | Performed by: INTERNAL MEDICINE

## 2024-04-10 PROCEDURE — G8417 CALC BMI ABV UP PARAM F/U: HCPCS | Performed by: INTERNAL MEDICINE

## 2024-04-10 ASSESSMENT — LIFESTYLE VARIABLES
HOW OFTEN DO YOU HAVE A DRINK CONTAINING ALCOHOL: 2-3 TIMES A WEEK
HOW MANY STANDARD DRINKS CONTAINING ALCOHOL DO YOU HAVE ON A TYPICAL DAY: 1 OR 2

## 2024-04-10 ASSESSMENT — ENCOUNTER SYMPTOMS
VOICE CHANGE: 0
CHOKING: 0
RECTAL PAIN: 0
EYE PAIN: 0
STRIDOR: 0

## 2024-04-10 NOTE — PROGRESS NOTES
(HCC)    Recommendations for Preventive Services Due: see orders and patient instructions/AVS.  Recommended screening schedule for the next 5-10 years is provided to the patient in written form: see Patient Instructions/AVS.     Return in about 6 months (around 10/10/2024) for follow up of chronic medical problems, review labs.     Subjective       Patient's complete Health Risk Assessment and screening values have been reviewed and are found in Flowsheets. The following problems were reviewed today and where indicated follow up appointments were made and/or referrals ordered.    Positive Risk Factor Screenings with Interventions:    Fall Risk:  Do you feel unsteady or are you worried about falling? : no  2 or more falls in past year?: no  Fall with injury in past year?: (!) yes     Interventions:    Reviewed medications, home hazards, visual acuity, and co-morbidities that can increase risk for falls             Activity, Diet, and Weight:  On average, how many days per week do you engage in moderate to strenuous exercise (like a brisk walk)?: 7 days (walks daily)  On average, how many minutes do you engage in exercise at this level?: 30 min    Do you eat balanced/healthy meals regularly?: Yes    Body mass index is 32.55 kg/m². (!) Abnormal    Obesity Interventions:  low carbohydrate diet                               Objective   Vitals:    04/10/24 0806   BP: 132/76   Site: Left Upper Arm   Position: Sitting   Pulse: 62   Weight: 105.9 kg (233 lb 6.4 oz)   Height: 1.803 m (5' 11\")      Body mass index is 32.55 kg/m².             No Known Allergies  Prior to Visit Medications    Medication Sig Taking? Authorizing Provider   tamsulosin (FLOMAX) 0.4 MG capsule Take 1 capsule by mouth daily Yes Contreras Stark MD   folic acid (FOLVITE) 1 MG tablet TAKE 1 TABLET BY MOUTH EVERY DAY **NOT COVERED** Yes Contreras Stark MD   atorvastatin (LIPITOR) 10 MG tablet TAKE 1 TABLET BY MOUTH EVERY DAY Yes Contreras Stark MD   carvedilol

## 2024-04-11 NOTE — ANESTHESIA PREPROCEDURE EVALUATION
Relevant Problems   CARDIOVASCULAR   (+) Hypertension   (+) PVC (premature ventricular contraction)   (+) Persistent atrial fibrillation (HCC)       Anesthetic History   No history of anesthetic complications            Review of Systems / Medical History  Patient summary reviewed and pertinent labs reviewed    Pulmonary  Within defined limits                 Neuro/Psych   Within defined limits           Cardiovascular    Hypertension: well controlled        Dysrhythmias : atrial fibrillation  Hyperlipidemia    Exercise tolerance: >4 METS  Comments: EF nl, severe LAE   GI/Hepatic/Renal         Renal disease: stones       Endo/Other        Obesity     Other Findings            Physical Exam    Airway  Mallampati: I  TM Distance: 4 - 6 cm  Neck ROM: normal range of motion   Mouth opening: Normal     Cardiovascular  Regular rate and rhythm,  S1 and S2 normal,  no murmur, click, rub, or gallop  Rhythm: regular  Rate: normal         Dental    Dentition: Full upper dentures and Lower partial plate     Pulmonary  Breath sounds clear to auscultation               Abdominal  GI exam deferred       Other Findings            Anesthetic Plan    ASA: 2  Anesthesia type: total IV anesthesia          Induction: Intravenous  Anesthetic plan and risks discussed with: Patient      Failed CV last time; for second attempt (previously shocked x 3) 95

## 2024-04-29 NOTE — TELEPHONE ENCOUNTER
Requested Prescriptions     Pending Prescriptions Disp Refills    rivaroxaban (XARELTO) 20 MG TABS tablet 90 tablet 3     Sig: Take 1 tablet by mouth Daily with supper

## 2024-05-10 ENCOUNTER — PATIENT MESSAGE (OUTPATIENT)
Dept: INTERNAL MEDICINE CLINIC | Facility: CLINIC | Age: 74
End: 2024-05-10

## 2024-05-10 DIAGNOSIS — D53.9 MACROCYTIC ANEMIA: ICD-10-CM

## 2024-05-10 DIAGNOSIS — E78.00 HYPERCHOLESTEROLEMIA: ICD-10-CM

## 2024-05-10 DIAGNOSIS — I48.19 PERSISTENT ATRIAL FIBRILLATION (HCC): ICD-10-CM

## 2024-05-10 DIAGNOSIS — M25.511 PAIN IN RIGHT SHOULDER: ICD-10-CM

## 2024-05-10 RX ORDER — FOLIC ACID 1 MG/1
1 TABLET ORAL DAILY
Qty: 90 TABLET | Refills: 3 | Status: SHIPPED | OUTPATIENT
Start: 2024-05-10

## 2024-05-10 RX ORDER — CARVEDILOL 25 MG/1
25 TABLET ORAL 2 TIMES DAILY WITH MEALS
Qty: 180 TABLET | Refills: 2 | Status: SHIPPED | OUTPATIENT
Start: 2024-05-10

## 2024-05-10 RX ORDER — ATORVASTATIN CALCIUM 10 MG/1
10 TABLET, FILM COATED ORAL DAILY
Qty: 90 TABLET | Refills: 2 | Status: SHIPPED | OUTPATIENT
Start: 2024-05-10

## 2024-05-10 RX ORDER — LISINOPRIL 20 MG/1
20 TABLET ORAL 2 TIMES DAILY
Qty: 180 TABLET | Refills: 2 | Status: SHIPPED | OUTPATIENT
Start: 2024-05-10

## 2024-05-10 NOTE — TELEPHONE ENCOUNTER
Requested Prescriptions     Pending Prescriptions Disp Refills    atorvastatin (LIPITOR) 10 MG tablet 90 tablet 2     Sig: Take 1 tablet by mouth daily    carvedilol (COREG) 25 MG tablet 180 tablet 2     Sig: Take 1 tablet by mouth 2 times daily (with meals)    folic acid (FOLVITE) 1 MG tablet 90 tablet 1    lisinopril (PRINIVIL;ZESTRIL) 20 MG tablet 180 tablet 2     Sig: Take 1 tablet by mouth 2 times daily    diclofenac sodium (VOLTAREN) 1 %  g 5     Sig: APPLY TO AFFECTED AREA FOUR (4) TIMES DAILY AS NEEDED FOR PAIN.     Dose verified and to [facility]. Patient is scheduled for follow up visit.

## 2024-05-10 NOTE — TELEPHONE ENCOUNTER
From: Hebert Vaca  To: Dr. Contreras Stark  Sent: 5/10/2024 11:47 AM EDT  Subject: prescriptions    Dr. Stark or his Nurse.  I'm sorry to use this format for a question but no other option seemed correct. My prescriptions for.  Carvedilol  Atorvastatin  Folic Acid  Lisinopril  Diclofenac gel  All need renewing.  When I look on my chart its show [facility] as my pharmacy which is incorrect. I thought I'd changed it to my new pharmacy. [facility] [address] [facility] but it seemed to have not worked. When I tried to change it from [facility] to [facility] on the prescription request message the system wouldn't let me change it.  Can I ask that you send new prescriptions for the listed drugs above to [facility].  Thankyou  Regards  Hebert Vaca

## 2024-05-28 DIAGNOSIS — R97.20 ELEVATED PSA: ICD-10-CM

## 2024-05-28 LAB
PSA FREE MFR SERPL: 33.5 %
PSA FREE SERPL-MCNC: 2.2 NG/ML
PSA SERPL-MCNC: 6.7 NG/ML (ref 0–4)

## 2024-06-17 ENCOUNTER — OFFICE VISIT (OUTPATIENT)
Dept: UROLOGY | Age: 74
End: 2024-06-17
Payer: MEDICARE

## 2024-06-17 DIAGNOSIS — N40.1 BPH WITH OBSTRUCTION/LOWER URINARY TRACT SYMPTOMS: ICD-10-CM

## 2024-06-17 DIAGNOSIS — R97.20 ELEVATED PSA: Primary | ICD-10-CM

## 2024-06-17 DIAGNOSIS — N13.8 BPH WITH OBSTRUCTION/LOWER URINARY TRACT SYMPTOMS: ICD-10-CM

## 2024-06-17 LAB
BILIRUBIN, URINE, POC: NEGATIVE
BLOOD URINE, POC: NORMAL
GLUCOSE URINE, POC: NEGATIVE
KETONES, URINE, POC: NEGATIVE
LEUKOCYTE ESTERASE, URINE, POC: NEGATIVE
NITRITE, URINE, POC: NEGATIVE
PH, URINE, POC: 6.5 (ref 4.6–8)
PROTEIN,URINE, POC: 30
SPECIFIC GRAVITY, URINE, POC: 1.02 (ref 1–1.03)
URINALYSIS CLARITY, POC: NORMAL
URINALYSIS COLOR, POC: NORMAL
UROBILINOGEN, POC: NORMAL

## 2024-06-17 PROCEDURE — 1123F ACP DISCUSS/DSCN MKR DOCD: CPT | Performed by: NURSE PRACTITIONER

## 2024-06-17 PROCEDURE — 81003 URINALYSIS AUTO W/O SCOPE: CPT | Performed by: NURSE PRACTITIONER

## 2024-06-17 PROCEDURE — 99213 OFFICE O/P EST LOW 20 MIN: CPT | Performed by: NURSE PRACTITIONER

## 2024-06-17 PROCEDURE — 3017F COLORECTAL CA SCREEN DOC REV: CPT | Performed by: NURSE PRACTITIONER

## 2024-06-17 PROCEDURE — 1036F TOBACCO NON-USER: CPT | Performed by: NURSE PRACTITIONER

## 2024-06-17 PROCEDURE — G8417 CALC BMI ABV UP PARAM F/U: HCPCS | Performed by: NURSE PRACTITIONER

## 2024-06-17 PROCEDURE — G8427 DOCREV CUR MEDS BY ELIG CLIN: HCPCS | Performed by: NURSE PRACTITIONER

## 2024-09-30 ENCOUNTER — OFFICE VISIT (OUTPATIENT)
Age: 74
End: 2024-09-30
Payer: MEDICARE

## 2024-09-30 VITALS
DIASTOLIC BLOOD PRESSURE: 60 MMHG | SYSTOLIC BLOOD PRESSURE: 132 MMHG | WEIGHT: 223 LBS | HEIGHT: 71 IN | BODY MASS INDEX: 31.22 KG/M2

## 2024-09-30 DIAGNOSIS — I71.21 ANEURYSM OF ASCENDING AORTA WITHOUT RUPTURE (HCC): Primary | ICD-10-CM

## 2024-09-30 DIAGNOSIS — I10 HYPERTENSION, ESSENTIAL: ICD-10-CM

## 2024-09-30 DIAGNOSIS — D68.69 SECONDARY HYPERCOAGULABLE STATE (HCC): ICD-10-CM

## 2024-09-30 DIAGNOSIS — E78.00 HYPERCHOLESTEROLEMIA: ICD-10-CM

## 2024-09-30 DIAGNOSIS — I48.0 PAF (PAROXYSMAL ATRIAL FIBRILLATION) (HCC): ICD-10-CM

## 2024-09-30 PROCEDURE — 1123F ACP DISCUSS/DSCN MKR DOCD: CPT | Performed by: INTERNAL MEDICINE

## 2024-09-30 PROCEDURE — 3078F DIAST BP <80 MM HG: CPT | Performed by: INTERNAL MEDICINE

## 2024-09-30 PROCEDURE — 3017F COLORECTAL CA SCREEN DOC REV: CPT | Performed by: INTERNAL MEDICINE

## 2024-09-30 PROCEDURE — 99214 OFFICE O/P EST MOD 30 MIN: CPT | Performed by: INTERNAL MEDICINE

## 2024-09-30 PROCEDURE — 3075F SYST BP GE 130 - 139MM HG: CPT | Performed by: INTERNAL MEDICINE

## 2024-09-30 PROCEDURE — 1036F TOBACCO NON-USER: CPT | Performed by: INTERNAL MEDICINE

## 2024-09-30 PROCEDURE — G8417 CALC BMI ABV UP PARAM F/U: HCPCS | Performed by: INTERNAL MEDICINE

## 2024-09-30 PROCEDURE — G8428 CUR MEDS NOT DOCUMENT: HCPCS | Performed by: INTERNAL MEDICINE

## 2024-09-30 ASSESSMENT — ENCOUNTER SYMPTOMS
BACK PAIN: 0
RESPIRATORY NEGATIVE: 1
PHOTOPHOBIA: 0
SHORTNESS OF BREATH: 0
GASTROINTESTINAL NEGATIVE: 1
ALLERGIC/IMMUNOLOGIC NEGATIVE: 1
EYES NEGATIVE: 1
CHEST TIGHTNESS: 0
ABDOMINAL PAIN: 0
EYE PAIN: 0

## 2024-09-30 NOTE — PROGRESS NOTES
tamsulosin (FLOMAX) 0.4 MG capsule Take 1 capsule by mouth daily 90 capsule 2    cetirizine (ZYRTEC) 10 MG tablet Take 1 tablet by mouth every evening      cyanocobalamin ([employer] VITAMIN B12) 1000 MCG tablet Take 1 tablet by mouth daily 90 tablet 3     No current facility-administered medications for this visit.       Lab Results   Component Value Date/Time    CHOL 113 10/03/2023 07:46 AM    HDL 48 10/03/2023 07:46 AM    LDL 49.8 10/03/2023 07:46 AM    VLDL 15.2 10/03/2023 07:46 AM    VLDL 16 01/11/2021 11:29 AM       ASSESSMENT and PLAN    ICD-10-CM    1. Aneurysm of ascending aorta without rupture (HCC)  I71.21 Echo (TTE) complete (PRN contrast/bubble/strain/3D)      2. Secondary hypercoagulable state (HCC)  D68.69       3. Hypercholesterolemia  E78.00       4. Hypertension, essential  I10       5. PAF (paroxysmal atrial fibrillation) (HCC)  I48.0           IMPRESSION:  1) afib -Xarelto 20 mg daily   2) TAA - check echo now  3) HTN - controlled on carvedilol 25 mg twice daily daily, Lisinopril 20 mg twice daily.      ALL ORDERS THIS ENCOUNTER  No orders of the defined types were placed in this encounter.       Follow up in 6 months.     Thank you for allowing me to participate in this patient's care.  Please call or contact me if there are any questions or concerns regarding the above.      Ignacio Poon MD  09/30/24  2:13 PM

## 2024-10-08 DIAGNOSIS — I10 HYPERTENSION, ESSENTIAL: ICD-10-CM

## 2024-10-08 DIAGNOSIS — E78.00 HYPERCHOLESTEROLEMIA: ICD-10-CM

## 2024-10-08 LAB
ALBUMIN SERPL-MCNC: 3.8 G/DL (ref 3.2–4.6)
ALBUMIN/GLOB SERPL: 1.3 (ref 1–1.9)
ALP SERPL-CCNC: 78 U/L (ref 40–129)
ALT SERPL-CCNC: 21 U/L (ref 8–55)
ANION GAP SERPL CALC-SCNC: 9 MMOL/L (ref 9–18)
AST SERPL-CCNC: 25 U/L (ref 15–37)
BASOPHILS # BLD: 0 K/UL (ref 0–0.2)
BASOPHILS NFR BLD: 0 % (ref 0–2)
BILIRUB SERPL-MCNC: 0.5 MG/DL (ref 0–1.2)
BUN SERPL-MCNC: 15 MG/DL (ref 8–23)
CALCIUM SERPL-MCNC: 9.2 MG/DL (ref 8.8–10.2)
CHLORIDE SERPL-SCNC: 106 MMOL/L (ref 98–107)
CHOLEST SERPL-MCNC: 106 MG/DL (ref 0–200)
CO2 SERPL-SCNC: 24 MMOL/L (ref 20–28)
CREAT SERPL-MCNC: 1.04 MG/DL (ref 0.8–1.3)
DIFFERENTIAL METHOD BLD: ABNORMAL
EOSINOPHIL # BLD: 0.2 K/UL (ref 0–0.8)
EOSINOPHIL NFR BLD: 3 % (ref 0.5–7.8)
ERYTHROCYTE [DISTWIDTH] IN BLOOD BY AUTOMATED COUNT: 13.3 % (ref 11.9–14.6)
GLOBULIN SER CALC-MCNC: 3 G/DL (ref 2.3–3.5)
GLUCOSE SERPL-MCNC: 98 MG/DL (ref 70–99)
HCT VFR BLD AUTO: 38.3 % (ref 41.1–50.3)
HDLC SERPL-MCNC: 44 MG/DL (ref 40–60)
HDLC SERPL: 2.4 (ref 0–5)
HGB BLD-MCNC: 12.3 G/DL (ref 13.6–17.2)
IMM GRANULOCYTES # BLD AUTO: 0 K/UL (ref 0–0.5)
IMM GRANULOCYTES NFR BLD AUTO: 0 % (ref 0–5)
LDLC SERPL CALC-MCNC: 50 MG/DL (ref 0–100)
LYMPHOCYTES # BLD: 1.6 K/UL (ref 0.5–4.6)
LYMPHOCYTES NFR BLD: 31 % (ref 13–44)
MCH RBC QN AUTO: 31.6 PG (ref 26.1–32.9)
MCHC RBC AUTO-ENTMCNC: 32.1 G/DL (ref 31.4–35)
MCV RBC AUTO: 98.5 FL (ref 82–102)
MONOCYTES # BLD: 0.4 K/UL (ref 0.1–1.3)
MONOCYTES NFR BLD: 8 % (ref 4–12)
NEUTS SEG # BLD: 2.9 K/UL (ref 1.7–8.2)
NEUTS SEG NFR BLD: 58 % (ref 43–78)
NRBC # BLD: 0 K/UL (ref 0–0.2)
PLATELET # BLD AUTO: 166 K/UL (ref 150–450)
PMV BLD AUTO: 11.8 FL (ref 9.4–12.3)
POTASSIUM SERPL-SCNC: 4.4 MMOL/L (ref 3.5–5.1)
PROT SERPL-MCNC: 6.8 G/DL (ref 6.3–8.2)
RBC # BLD AUTO: 3.89 M/UL (ref 4.23–5.6)
SODIUM SERPL-SCNC: 139 MMOL/L (ref 136–145)
TRIGL SERPL-MCNC: 58 MG/DL (ref 0–150)
VLDLC SERPL CALC-MCNC: 12 MG/DL (ref 6–23)
WBC # BLD AUTO: 5 K/UL (ref 4.3–11.1)

## 2024-10-16 ENCOUNTER — HOSPITAL ENCOUNTER (OUTPATIENT)
Dept: GENERAL RADIOLOGY | Age: 74
Discharge: HOME | End: 2024-10-18
Payer: MEDICARE

## 2024-10-16 ENCOUNTER — OFFICE VISIT (OUTPATIENT)
Dept: INTERNAL MEDICINE CLINIC | Facility: CLINIC | Age: 74
End: 2024-10-16
Payer: MEDICARE

## 2024-10-16 VITALS
HEART RATE: 65 BPM | HEIGHT: 71 IN | BODY MASS INDEX: 31.08 KG/M2 | WEIGHT: 222 LBS | SYSTOLIC BLOOD PRESSURE: 110 MMHG | DIASTOLIC BLOOD PRESSURE: 60 MMHG

## 2024-10-16 DIAGNOSIS — I48.0 PAF (PAROXYSMAL ATRIAL FIBRILLATION) (HCC): ICD-10-CM

## 2024-10-16 DIAGNOSIS — E78.00 HYPERCHOLESTEROLEMIA: ICD-10-CM

## 2024-10-16 DIAGNOSIS — N18.31 STAGE 3A CHRONIC KIDNEY DISEASE (HCC): ICD-10-CM

## 2024-10-16 DIAGNOSIS — D64.9 CHRONIC ANEMIA: ICD-10-CM

## 2024-10-16 DIAGNOSIS — G89.29 CHRONIC PAIN OF LEFT ANKLE: ICD-10-CM

## 2024-10-16 DIAGNOSIS — M25.572 CHRONIC PAIN OF LEFT ANKLE: ICD-10-CM

## 2024-10-16 DIAGNOSIS — R73.01 IMPAIRED FASTING GLUCOSE: ICD-10-CM

## 2024-10-16 DIAGNOSIS — I10 HYPERTENSION, ESSENTIAL: Primary | ICD-10-CM

## 2024-10-16 PROBLEM — S82.892A CLOSED FRACTURE OF LEFT ANKLE: Status: RESOLVED | Noted: 2023-07-20 | Resolved: 2024-10-16

## 2024-10-16 PROCEDURE — G8417 CALC BMI ABV UP PARAM F/U: HCPCS | Performed by: INTERNAL MEDICINE

## 2024-10-16 PROCEDURE — 3078F DIAST BP <80 MM HG: CPT | Performed by: INTERNAL MEDICINE

## 2024-10-16 PROCEDURE — 73610 X-RAY EXAM OF ANKLE: CPT

## 2024-10-16 PROCEDURE — 1123F ACP DISCUSS/DSCN MKR DOCD: CPT | Performed by: INTERNAL MEDICINE

## 2024-10-16 PROCEDURE — 1036F TOBACCO NON-USER: CPT | Performed by: INTERNAL MEDICINE

## 2024-10-16 PROCEDURE — 3017F COLORECTAL CA SCREEN DOC REV: CPT | Performed by: INTERNAL MEDICINE

## 2024-10-16 PROCEDURE — G2211 COMPLEX E/M VISIT ADD ON: HCPCS | Performed by: INTERNAL MEDICINE

## 2024-10-16 PROCEDURE — G8484 FLU IMMUNIZE NO ADMIN: HCPCS | Performed by: INTERNAL MEDICINE

## 2024-10-16 PROCEDURE — G8428 CUR MEDS NOT DOCUMENT: HCPCS | Performed by: INTERNAL MEDICINE

## 2024-10-16 PROCEDURE — 99214 OFFICE O/P EST MOD 30 MIN: CPT | Performed by: INTERNAL MEDICINE

## 2024-10-16 PROCEDURE — 3074F SYST BP LT 130 MM HG: CPT | Performed by: INTERNAL MEDICINE

## 2024-10-16 RX ORDER — ANTIOX #8/OM3/DHA/EPA/LUT/ZEAX 250-2.5 MG
CAPSULE ORAL 2 TIMES DAILY
COMMUNITY

## 2024-10-16 RX ORDER — TAMSULOSIN HYDROCHLORIDE 0.4 MG/1
0.4 CAPSULE ORAL DAILY
Qty: 90 CAPSULE | Refills: 2 | Status: SHIPPED | OUTPATIENT
Start: 2024-10-16

## 2024-10-16 ASSESSMENT — ENCOUNTER SYMPTOMS
VOICE CHANGE: 0
EYE PAIN: 0
STRIDOR: 0
CHOKING: 0
RECTAL PAIN: 0

## 2024-10-16 NOTE — PROGRESS NOTES
of Transportation (Non-Medical): No   Physical Activity: Sufficiently Active (4/10/2024)    Exercise Vital Sign     Days of Exercise per Week: 7 days     Minutes of Exercise per Session: 30 min   Stress: Not on file   Social Connections: Unknown (3/19/2021)    Received from [facility] [facility]    Social Connections     Frequency of Communication with Friends and Family: Not asked     Frequency of Social Gatherings with Friends and Family: Not asked   Intimate Partner Violence: Unknown (3/19/2021)    Received from [facility]    Intimate Partner Violence     Fear of Current or Ex-Partner: Not asked     Emotionally Abused: Not asked     Physically Abused: Not asked     Sexually Abused: Not asked   Housing Stability: Unknown (2/17/2023)    Housing Stability Vital Sign     Unable to Pay for Housing in the Last Year: Not on file     Number of Places Lived in the Last Year: Not on file     Unstable Housing in the Last Year: No       Current Outpatient Medications   Medication Sig Dispense Refill    Multiple Vitamins-Minerals (PRESERVISION AREDS 2) CAPS Take by mouth in the morning and at bedtime      tamsulosin (FLOMAX) 0.4 MG capsule Take 1 capsule by mouth daily 90 capsule 2    atorvastatin (LIPITOR) 10 MG tablet Take 1 tablet by mouth daily 90 tablet 2    carvedilol (COREG) 25 MG tablet Take 1 tablet by mouth 2 times daily (with meals) 180 tablet 2    folic acid (FOLVITE) 1 MG tablet Take 1 tablet by mouth daily 90 tablet 3    lisinopril (PRINIVIL;ZESTRIL) 20 MG tablet Take 1 tablet by mouth 2 times daily 180 tablet 2    diclofenac sodium (VOLTAREN) 1 % GEL APPLY TO AFFECTED AREA FOUR (4) TIMES DAILY AS NEEDED FOR PAIN. 100 g 5    rivaroxaban (XARELTO) 20 MG TABS tablet Take 1 tablet by mouth Daily with supper 90 tablet 3    cetirizine (ZYRTEC) 10 MG tablet Take 1 tablet by mouth every evening      cyanocobalamin ([employer] VITAMIN B12) 1000 MCG tablet Take 1 tablet by mouth daily 90 tablet 3     No

## 2024-10-21 ENCOUNTER — TELEPHONE (OUTPATIENT)
Dept: INTERNAL MEDICINE CLINIC | Facility: CLINIC | Age: 74
End: 2024-10-21

## 2024-10-21 NOTE — TELEPHONE ENCOUNTER
----- Message from Dr. Contreras Stark MD sent at 10/21/2024 10:59 AM EDT -----  Call patient.  Left ankle X-ray shows old healed injury with hardware in place.  It is \"maybe possible\" that is pain is due to the hardware and \"might\" improve if the hardware were removed (or maybe not).  Would he like to see an orthopedist?

## 2024-12-09 DIAGNOSIS — N40.1 BPH WITH OBSTRUCTION/LOWER URINARY TRACT SYMPTOMS: ICD-10-CM

## 2024-12-09 DIAGNOSIS — N13.8 BPH WITH OBSTRUCTION/LOWER URINARY TRACT SYMPTOMS: ICD-10-CM

## 2024-12-09 DIAGNOSIS — R97.20 ELEVATED PSA: ICD-10-CM

## 2024-12-09 LAB — PSA SERPL-MCNC: 7.1 NG/ML (ref 0–4)

## 2024-12-16 ENCOUNTER — OFFICE VISIT (OUTPATIENT)
Dept: UROLOGY | Age: 74
End: 2024-12-16
Payer: MEDICARE

## 2024-12-16 DIAGNOSIS — N13.8 BPH WITH OBSTRUCTION/LOWER URINARY TRACT SYMPTOMS: Primary | ICD-10-CM

## 2024-12-16 DIAGNOSIS — N40.1 BPH WITH OBSTRUCTION/LOWER URINARY TRACT SYMPTOMS: Primary | ICD-10-CM

## 2024-12-16 LAB
BILIRUBIN, URINE, POC: NEGATIVE
BLOOD URINE, POC: NEGATIVE
GLUCOSE URINE, POC: NEGATIVE
KETONES, URINE, POC: NEGATIVE
LEUKOCYTE ESTERASE, URINE, POC: NEGATIVE
NITRITE, URINE, POC: NEGATIVE
PH, URINE, POC: 7 (ref 4.6–8)
PROTEIN,URINE, POC: 100
SPECIFIC GRAVITY, URINE, POC: 1.02 (ref 1–1.03)
URINALYSIS CLARITY, POC: CLEAR
URINALYSIS COLOR, POC: YELLOW
UROBILINOGEN, POC: NORMAL

## 2024-12-16 PROCEDURE — G8427 DOCREV CUR MEDS BY ELIG CLIN: HCPCS | Performed by: UROLOGY

## 2024-12-16 PROCEDURE — 81003 URINALYSIS AUTO W/O SCOPE: CPT | Performed by: UROLOGY

## 2024-12-16 PROCEDURE — 1123F ACP DISCUSS/DSCN MKR DOCD: CPT | Performed by: UROLOGY

## 2024-12-16 PROCEDURE — G8484 FLU IMMUNIZE NO ADMIN: HCPCS | Performed by: UROLOGY

## 2024-12-16 PROCEDURE — 1036F TOBACCO NON-USER: CPT | Performed by: UROLOGY

## 2024-12-16 PROCEDURE — 99213 OFFICE O/P EST LOW 20 MIN: CPT | Performed by: UROLOGY

## 2024-12-16 PROCEDURE — 1159F MED LIST DOCD IN RCRD: CPT | Performed by: UROLOGY

## 2024-12-16 PROCEDURE — G8417 CALC BMI ABV UP PARAM F/U: HCPCS | Performed by: UROLOGY

## 2024-12-16 NOTE — PROGRESS NOTES
Take by mouth in the morning and at bedtime      tamsulosin (FLOMAX) 0.4 MG capsule Take 1 capsule by mouth daily 90 capsule 2    atorvastatin (LIPITOR) 10 MG tablet Take 1 tablet by mouth daily 90 tablet 2    carvedilol (COREG) 25 MG tablet Take 1 tablet by mouth 2 times daily (with meals) 180 tablet 2    folic acid (FOLVITE) 1 MG tablet Take 1 tablet by mouth daily 90 tablet 3    lisinopril (PRINIVIL;ZESTRIL) 20 MG tablet Take 1 tablet by mouth 2 times daily 180 tablet 2    diclofenac sodium (VOLTAREN) 1 % GEL APPLY TO AFFECTED AREA FOUR (4) TIMES DAILY AS NEEDED FOR PAIN. 100 g 5    rivaroxaban (XARELTO) 20 MG TABS tablet Take 1 tablet by mouth Daily with supper 90 tablet 3    cetirizine (ZYRTEC) 10 MG tablet Take 1 tablet by mouth every evening      cyanocobalamin ([employer] VITAMIN B12) 1000 MCG tablet Take 1 tablet by mouth daily 90 tablet 3     No current facility-administered medications for this visit.     No Known Allergies  Social History     Socioeconomic History    Marital status:      Spouse name: Not on file    Number of children: Not on file    Years of education: Not on file    Highest education level: Not on file   Occupational History    Not on file   Tobacco Use    Smoking status: Former     Current packs/day: 0.00     Average packs/day: 1 pack/day for 15.0 years (15.0 ttl pk-yrs)     Types: Cigarettes     Start date: 1965     Quit date: 1980     Years since quittin.9     Passive exposure: Past    Smokeless tobacco: Never   Vaping Use    Vaping status: Never Used   Substance and Sexual Activity    Alcohol use: Yes     Alcohol/week: 7.0 standard drinks of alcohol     Types: 7 Glasses of wine per week     Comment: glass of wine per day    Drug use: No    Sexual activity: Not on file   Other Topics Concern    Not on file   Social History Narrative    Not on file     Social Determinants of Health     Financial Resource Strain: Low Risk  (2023)    Overall Financial Resource Strain

## 2025-03-24 ENCOUNTER — OFFICE VISIT (OUTPATIENT)
Age: 75
End: 2025-03-24
Payer: MEDICARE

## 2025-03-24 VITALS
DIASTOLIC BLOOD PRESSURE: 82 MMHG | BODY MASS INDEX: 31.81 KG/M2 | WEIGHT: 222.2 LBS | HEART RATE: 65 BPM | SYSTOLIC BLOOD PRESSURE: 138 MMHG | HEIGHT: 70 IN

## 2025-03-24 DIAGNOSIS — I10 HYPERTENSION, ESSENTIAL: ICD-10-CM

## 2025-03-24 DIAGNOSIS — I48.0 PAF (PAROXYSMAL ATRIAL FIBRILLATION) (HCC): Primary | ICD-10-CM

## 2025-03-24 DIAGNOSIS — I71.21 ANEURYSM OF ASCENDING AORTA WITHOUT RUPTURE: ICD-10-CM

## 2025-03-24 DIAGNOSIS — R07.2 PRECORDIAL PAIN: ICD-10-CM

## 2025-03-24 PROCEDURE — 1123F ACP DISCUSS/DSCN MKR DOCD: CPT | Performed by: INTERNAL MEDICINE

## 2025-03-24 PROCEDURE — G8417 CALC BMI ABV UP PARAM F/U: HCPCS | Performed by: INTERNAL MEDICINE

## 2025-03-24 PROCEDURE — G8427 DOCREV CUR MEDS BY ELIG CLIN: HCPCS | Performed by: INTERNAL MEDICINE

## 2025-03-24 PROCEDURE — 1159F MED LIST DOCD IN RCRD: CPT | Performed by: INTERNAL MEDICINE

## 2025-03-24 PROCEDURE — 1126F AMNT PAIN NOTED NONE PRSNT: CPT | Performed by: INTERNAL MEDICINE

## 2025-03-24 PROCEDURE — 3075F SYST BP GE 130 - 139MM HG: CPT | Performed by: INTERNAL MEDICINE

## 2025-03-24 PROCEDURE — 3079F DIAST BP 80-89 MM HG: CPT | Performed by: INTERNAL MEDICINE

## 2025-03-24 PROCEDURE — 1036F TOBACCO NON-USER: CPT | Performed by: INTERNAL MEDICINE

## 2025-03-24 PROCEDURE — 99214 OFFICE O/P EST MOD 30 MIN: CPT | Performed by: INTERNAL MEDICINE

## 2025-03-24 PROCEDURE — 1160F RVW MEDS BY RX/DR IN RCRD: CPT | Performed by: INTERNAL MEDICINE

## 2025-03-24 PROCEDURE — 93000 ELECTROCARDIOGRAM COMPLETE: CPT | Performed by: INTERNAL MEDICINE

## 2025-03-24 ASSESSMENT — ENCOUNTER SYMPTOMS
RESPIRATORY NEGATIVE: 1
BACK PAIN: 0
EYES NEGATIVE: 1
ALLERGIC/IMMUNOLOGIC NEGATIVE: 1
ABDOMINAL PAIN: 0
CHEST TIGHTNESS: 0
SHORTNESS OF BREATH: 0
PHOTOPHOBIA: 0
EYE PAIN: 0
GASTROINTESTINAL NEGATIVE: 1

## 2025-03-24 NOTE — PROGRESS NOTES
[facility]  [address] [place] [address], [address]  [place] [address]  PHONE: [phone]      25    NAME:  Hebert Vaca  : 1950  MRN: [id number]         SUBJECTIVE:   Hebert Vaca is a 74 y.o. male seen for follow up of:      Chief Complaint   Patient presents with    Atrial Fibrillation        Cardiac Hx (Reviewed and summarized by me):  1) NM stress 21 - normal perfusion but LVEF 46%  2) Echo               21 - LVEF 55-60%, Aortic root is dilated, LAE as well  3) TAA              CT Chest 21 Aortic Root is 4.4 cm (4.25 cm by echo )              6/10/22 - LVEF 55-60% root as listed below              CT Chest 10/24/23 - Asc Aorta is 4.3, STJ 3.8 cm (echo 6/10/22 root 3.5 cm)   Echo 24 - LVEF 55-60% with asc Aorta 4.6 cm.  4) Monitor   21 - 3d Zio - 1.2% PVCs  22 - PVCs 0.05%, PACs 5.7%  5) Lipids                 21 - HDL 56, LDL 65, Trig 83              22 - HDL 41, LDL 54.6, Trig 127              10/3/23 - HDL 48, LDL 49.8, Trig 76, Ratio 2.4  6) afib -               Had episode of Afib with Colonoscopy, started on Xarelto (21).  He is unaware.              Attempted ALEX/DCCV 21 - failed started on amiodarone              Successful DCCV 22              Afib ablation 3/29/22 [place]  7) AAA screening 3/23/23 - none seen    ECG: NSR normal axis single PAC (Independent review/interpretation by me)      HPI:  Has reported some episodes of substernal chest pain mostly while at rest.  A squeezing type sensation in his chest that seems to come after about a minute.  Last stress test was in .  We did an echocardiogram last  that showed his aorta at 4.6 cm previous measurements have been 4.3 and 4.4 cm.    Past Medical History, Past Surgical History, Family history, Social History, and Medications were all reviewed with the patient today and updated as necessary.       Current Outpatient Medications:     Multiple  [FreeTextEntry6] : see hpi  [FreeTextEntry5] : see hpi

## 2025-03-31 ENCOUNTER — HOSPITAL ENCOUNTER (OUTPATIENT)
Dept: CT IMAGING | Age: 75
Discharge: HOME | End: 2025-04-02
Attending: INTERNAL MEDICINE
Payer: MEDICARE

## 2025-03-31 ENCOUNTER — RESULTS FOLLOW-UP (OUTPATIENT)
Dept: CARDIOLOGY | Age: 75
End: 2025-03-31

## 2025-03-31 DIAGNOSIS — I71.21 ANEURYSM OF ASCENDING AORTA WITHOUT RUPTURE: ICD-10-CM

## 2025-03-31 DIAGNOSIS — I48.19 PERSISTENT ATRIAL FIBRILLATION (HCC): ICD-10-CM

## 2025-03-31 LAB — CREAT BLD-MCNC: 0.96 MG/DL (ref 0.8–1.5)

## 2025-03-31 PROCEDURE — 6360000004 HC RX CONTRAST MEDICATION: Performed by: INTERNAL MEDICINE

## 2025-03-31 PROCEDURE — 82565 ASSAY OF CREATININE: CPT

## 2025-03-31 PROCEDURE — 71275 CT ANGIOGRAPHY CHEST: CPT

## 2025-03-31 RX ORDER — CARVEDILOL 25 MG/1
25 TABLET ORAL 2 TIMES DAILY WITH MEALS
Qty: 180 TABLET | Refills: 2 | Status: SHIPPED | OUTPATIENT
Start: 2025-03-31

## 2025-03-31 RX ORDER — IOPAMIDOL 755 MG/ML
100 INJECTION, SOLUTION INTRAVASCULAR
Status: COMPLETED | OUTPATIENT
Start: 2025-03-31 | End: 2025-03-31

## 2025-03-31 RX ADMIN — IOPAMIDOL 100 ML: 755 INJECTION, SOLUTION INTRAVENOUS at 08:38

## 2025-03-31 NOTE — TELEPHONE ENCOUNTER
----- Message from Dr. Ignacio Poon MD sent at 3/31/2025 11:15 AM EDT -----  Aorta size has grown slightly to 4.5 cm.  Will continue to monitor.

## 2025-03-31 NOTE — TELEPHONE ENCOUNTER
I called the patient and informed of his results from Dr. Poon.  Patient was informed Dr. Poon will continue to monitor. Patient verbalized understanding.

## 2025-04-07 ENCOUNTER — RESULTS FOLLOW-UP (OUTPATIENT)
Dept: CARDIOLOGY CLINIC | Age: 75
End: 2025-04-07

## 2025-04-07 DIAGNOSIS — E78.00 HYPERCHOLESTEROLEMIA: ICD-10-CM

## 2025-04-07 RX ORDER — ATORVASTATIN CALCIUM 10 MG/1
10 TABLET, FILM COATED ORAL DAILY
Qty: 90 TABLET | Refills: 2 | Status: SHIPPED | OUTPATIENT
Start: 2025-04-07

## 2025-04-07 NOTE — TELEPHONE ENCOUNTER
----- Message from Dr. Ignacio Poon MD sent at 4/7/2025  7:18 AM EDT -----  No significant findings on the stress test but can we arrange a follow up to discuss the results?    Ignacio Poon MD

## 2025-04-07 NOTE — TELEPHONE ENCOUNTER
I called the patient to inform him of results and that Dr. Poon would like him to come in for a follow up to discuss results.  Patient is going to call back once home to schedule appointment.  Please schedule patient for a follow up when he calls back.    ----- Message from Dr. Ignacio Poon MD sent at 4/7/2025  7:18 AM EDT -----  No significant findings on the stress test but can we arrange a follow up to discuss the results?     Ignacio Poon MD

## 2025-04-07 NOTE — RESULT ENCOUNTER NOTE
No significant findings on the stress test but can we arrange a follow up to discuss the results?    Ignacio oPon MD

## 2025-04-22 DIAGNOSIS — R73.01 IMPAIRED FASTING GLUCOSE: ICD-10-CM

## 2025-04-22 DIAGNOSIS — D64.9 CHRONIC ANEMIA: ICD-10-CM

## 2025-04-22 DIAGNOSIS — I10 HYPERTENSION, ESSENTIAL: ICD-10-CM

## 2025-04-22 LAB
ANION GAP SERPL CALC-SCNC: 10 MMOL/L (ref 7–16)
BASOPHILS # BLD: 0.04 K/UL (ref 0–0.2)
BASOPHILS NFR BLD: 0.7 % (ref 0–2)
BUN SERPL-MCNC: 14 MG/DL (ref 8–23)
CALCIUM SERPL-MCNC: 8.9 MG/DL (ref 8.8–10.2)
CHLORIDE SERPL-SCNC: 105 MMOL/L (ref 98–107)
CO2 SERPL-SCNC: 24 MMOL/L (ref 20–29)
CREAT SERPL-MCNC: 0.99 MG/DL (ref 0.8–1.3)
DIFFERENTIAL METHOD BLD: ABNORMAL
EOSINOPHIL # BLD: 0.1 K/UL (ref 0–0.8)
EOSINOPHIL NFR BLD: 1.8 % (ref 0.5–7.8)
ERYTHROCYTE [DISTWIDTH] IN BLOOD BY AUTOMATED COUNT: 13.4 % (ref 11.9–14.6)
EST. AVERAGE GLUCOSE BLD GHB EST-MCNC: 109 MG/DL
GLUCOSE SERPL-MCNC: 103 MG/DL (ref 70–99)
HBA1C MFR BLD: 5.4 % (ref 0–5.6)
HCT VFR BLD AUTO: 35.8 % (ref 41.1–50.3)
HGB BLD-MCNC: 11.3 G/DL (ref 13.6–17.2)
IMM GRANULOCYTES # BLD AUTO: 0.01 K/UL (ref 0–0.5)
IMM GRANULOCYTES NFR BLD AUTO: 0.2 % (ref 0–5)
LYMPHOCYTES # BLD: 1.63 K/UL (ref 0.5–4.6)
LYMPHOCYTES NFR BLD: 28.8 % (ref 13–44)
MCH RBC QN AUTO: 29.6 PG (ref 26.1–32.9)
MCHC RBC AUTO-ENTMCNC: 31.6 G/DL (ref 31.4–35)
MCV RBC AUTO: 93.7 FL (ref 82–102)
MONOCYTES # BLD: 0.48 K/UL (ref 0.1–1.3)
MONOCYTES NFR BLD: 8.5 % (ref 4–12)
NEUTS SEG # BLD: 3.4 K/UL (ref 1.7–8.2)
NEUTS SEG NFR BLD: 60 % (ref 43–78)
NRBC # BLD: 0 K/UL (ref 0–0.2)
PLATELET # BLD AUTO: 172 K/UL (ref 150–450)
PMV BLD AUTO: 11.9 FL (ref 9.4–12.3)
POTASSIUM SERPL-SCNC: 4.3 MMOL/L (ref 3.5–5.1)
RBC # BLD AUTO: 3.82 M/UL (ref 4.23–5.6)
SODIUM SERPL-SCNC: 139 MMOL/L (ref 136–145)
VIT B12 SERPL-MCNC: 709 PG/ML (ref 193–986)
WBC # BLD AUTO: 5.7 K/UL (ref 4.3–11.1)

## 2025-04-24 LAB — FOLATE SERPL-MCNC: NORMAL NG/ML (ref 5.4–24)

## 2025-04-24 NOTE — TELEPHONE ENCOUNTER
Last office visit 3/24/25.    Requested Prescriptions     Pending Prescriptions Disp Refills    rivaroxaban (XARELTO) 20 MG TABS tablet 90 tablet 3     Sig: Take 1 tablet by mouth Daily with supper

## 2025-04-26 SDOH — ECONOMIC STABILITY: TRANSPORTATION INSECURITY
IN THE PAST 12 MONTHS, HAS THE LACK OF TRANSPORTATION KEPT YOU FROM MEDICAL APPOINTMENTS OR FROM GETTING MEDICATIONS?: NO

## 2025-04-26 SDOH — ECONOMIC STABILITY: FOOD INSECURITY: WITHIN THE PAST 12 MONTHS, YOU WORRIED THAT YOUR FOOD WOULD RUN OUT BEFORE YOU GOT MONEY TO BUY MORE.: NEVER TRUE

## 2025-04-26 SDOH — ECONOMIC STABILITY: FOOD INSECURITY: WITHIN THE PAST 12 MONTHS, THE FOOD YOU BOUGHT JUST DIDN'T LAST AND YOU DIDN'T HAVE MONEY TO GET MORE.: NEVER TRUE

## 2025-04-26 SDOH — HEALTH STABILITY: PHYSICAL HEALTH: ON AVERAGE, HOW MANY DAYS PER WEEK DO YOU ENGAGE IN MODERATE TO STRENUOUS EXERCISE (LIKE A BRISK WALK)?: 2 DAYS

## 2025-04-26 SDOH — HEALTH STABILITY: PHYSICAL HEALTH: ON AVERAGE, HOW MANY MINUTES DO YOU ENGAGE IN EXERCISE AT THIS LEVEL?: 10 MIN

## 2025-04-26 SDOH — ECONOMIC STABILITY: INCOME INSECURITY: IN THE LAST 12 MONTHS, WAS THERE A TIME WHEN YOU WERE NOT ABLE TO PAY THE MORTGAGE OR RENT ON TIME?: NO

## 2025-04-26 ASSESSMENT — LIFESTYLE VARIABLES
HOW OFTEN DURING THE LAST YEAR HAVE YOU FAILED TO DO WHAT WAS NORMALLY EXPECTED FROM YOU BECAUSE OF DRINKING: NEVER
HAS A RELATIVE, FRIEND, DOCTOR, OR ANOTHER HEALTH PROFESSIONAL EXPRESSED CONCERN ABOUT YOUR DRINKING OR SUGGESTED YOU CUT DOWN: NO
HOW OFTEN DURING THE LAST YEAR HAVE YOU NEEDED AN ALCOHOLIC DRINK FIRST THING IN THE MORNING TO GET YOURSELF GOING AFTER A NIGHT OF HEAVY DRINKING: NEVER
HOW OFTEN DO YOU HAVE A DRINK CONTAINING ALCOHOL: 4 OR MORE TIMES A WEEK
HAVE YOU OR SOMEONE ELSE BEEN INJURED AS A RESULT OF YOUR DRINKING: NO
HOW OFTEN DURING THE LAST YEAR HAVE YOU FOUND THAT YOU WERE NOT ABLE TO STOP DRINKING ONCE YOU HAD STARTED: NEVER
HOW OFTEN DURING THE LAST YEAR HAVE YOU FAILED TO DO WHAT WAS NORMALLY EXPECTED FROM YOU BECAUSE OF DRINKING: NEVER
HOW OFTEN DO YOU HAVE A DRINK CONTAINING ALCOHOL: 5
HOW OFTEN DURING THE LAST YEAR HAVE YOU FOUND THAT YOU WERE NOT ABLE TO STOP DRINKING ONCE YOU HAD STARTED: NEVER
HOW OFTEN DURING THE LAST YEAR HAVE YOU HAD A FEELING OF GUILT OR REMORSE AFTER DRINKING: NEVER
HOW MANY STANDARD DRINKS CONTAINING ALCOHOL DO YOU HAVE ON A TYPICAL DAY: 1 OR 2
HOW OFTEN DURING THE LAST YEAR HAVE YOU BEEN UNABLE TO REMEMBER WHAT HAPPENED THE NIGHT BEFORE BECAUSE YOU HAD BEEN DRINKING: NEVER
HOW OFTEN DO YOU HAVE SIX OR MORE DRINKS ON ONE OCCASION: 1
HOW MANY STANDARD DRINKS CONTAINING ALCOHOL DO YOU HAVE ON A TYPICAL DAY: 1
HAVE YOU OR SOMEONE ELSE BEEN INJURED AS A RESULT OF YOUR DRINKING: NO
HOW OFTEN DURING THE LAST YEAR HAVE YOU BEEN UNABLE TO REMEMBER WHAT HAPPENED THE NIGHT BEFORE BECAUSE YOU HAD BEEN DRINKING: NEVER
HOW OFTEN DURING THE LAST YEAR HAVE YOU NEEDED AN ALCOHOLIC DRINK FIRST THING IN THE MORNING TO GET YOURSELF GOING AFTER A NIGHT OF HEAVY DRINKING: NEVER
HAS A RELATIVE, FRIEND, DOCTOR, OR ANOTHER HEALTH PROFESSIONAL EXPRESSED CONCERN ABOUT YOUR DRINKING OR SUGGESTED YOU CUT DOWN: NO
HOW OFTEN DURING THE LAST YEAR HAVE YOU HAD A FEELING OF GUILT OR REMORSE AFTER DRINKING: NEVER

## 2025-04-26 ASSESSMENT — PATIENT HEALTH QUESTIONNAIRE - PHQ9
SUM OF ALL RESPONSES TO PHQ QUESTIONS 1-9: 0
1. LITTLE INTEREST OR PLEASURE IN DOING THINGS: NOT AT ALL
SUM OF ALL RESPONSES TO PHQ QUESTIONS 1-9: 0
2. FEELING DOWN, DEPRESSED OR HOPELESS: NOT AT ALL

## 2025-04-29 ENCOUNTER — OFFICE VISIT (OUTPATIENT)
Dept: INTERNAL MEDICINE CLINIC | Facility: CLINIC | Age: 75
End: 2025-04-29
Payer: MEDICARE

## 2025-04-29 VITALS
DIASTOLIC BLOOD PRESSURE: 68 MMHG | SYSTOLIC BLOOD PRESSURE: 126 MMHG | BODY MASS INDEX: 32.35 KG/M2 | TEMPERATURE: 97 F | OXYGEN SATURATION: 99 % | HEIGHT: 70 IN | WEIGHT: 226 LBS

## 2025-04-29 DIAGNOSIS — D64.9 CHRONIC ANEMIA: ICD-10-CM

## 2025-04-29 DIAGNOSIS — K63.5 POLYP OF COLON, UNSPECIFIED PART OF COLON, UNSPECIFIED TYPE: ICD-10-CM

## 2025-04-29 DIAGNOSIS — I48.0 PAF (PAROXYSMAL ATRIAL FIBRILLATION) (HCC): ICD-10-CM

## 2025-04-29 DIAGNOSIS — I10 HYPERTENSION, ESSENTIAL: ICD-10-CM

## 2025-04-29 DIAGNOSIS — R35.1 BPH ASSOCIATED WITH NOCTURIA: ICD-10-CM

## 2025-04-29 DIAGNOSIS — D53.9 MACROCYTIC ANEMIA: ICD-10-CM

## 2025-04-29 DIAGNOSIS — I71.21 ANEURYSM OF ASCENDING AORTA WITHOUT RUPTURE: ICD-10-CM

## 2025-04-29 DIAGNOSIS — N40.1 BPH ASSOCIATED WITH NOCTURIA: ICD-10-CM

## 2025-04-29 DIAGNOSIS — R73.01 IMPAIRED FASTING GLUCOSE: ICD-10-CM

## 2025-04-29 DIAGNOSIS — E78.00 HYPERCHOLESTEROLEMIA: ICD-10-CM

## 2025-04-29 DIAGNOSIS — Z00.00 MEDICARE ANNUAL WELLNESS VISIT, SUBSEQUENT: Primary | Chronic | ICD-10-CM

## 2025-04-29 DIAGNOSIS — N18.31 STAGE 3A CHRONIC KIDNEY DISEASE (HCC): ICD-10-CM

## 2025-04-29 PROCEDURE — 3074F SYST BP LT 130 MM HG: CPT | Performed by: INTERNAL MEDICINE

## 2025-04-29 PROCEDURE — G8428 CUR MEDS NOT DOCUMENT: HCPCS | Performed by: INTERNAL MEDICINE

## 2025-04-29 PROCEDURE — 99214 OFFICE O/P EST MOD 30 MIN: CPT | Performed by: INTERNAL MEDICINE

## 2025-04-29 PROCEDURE — 1126F AMNT PAIN NOTED NONE PRSNT: CPT | Performed by: INTERNAL MEDICINE

## 2025-04-29 PROCEDURE — G2211 COMPLEX E/M VISIT ADD ON: HCPCS | Performed by: INTERNAL MEDICINE

## 2025-04-29 PROCEDURE — 1036F TOBACCO NON-USER: CPT | Performed by: INTERNAL MEDICINE

## 2025-04-29 PROCEDURE — G0439 PPPS, SUBSEQ VISIT: HCPCS | Performed by: INTERNAL MEDICINE

## 2025-04-29 PROCEDURE — 3078F DIAST BP <80 MM HG: CPT | Performed by: INTERNAL MEDICINE

## 2025-04-29 PROCEDURE — 1123F ACP DISCUSS/DSCN MKR DOCD: CPT | Performed by: INTERNAL MEDICINE

## 2025-04-29 PROCEDURE — G8417 CALC BMI ABV UP PARAM F/U: HCPCS | Performed by: INTERNAL MEDICINE

## 2025-04-29 RX ORDER — FOLIC ACID 1 MG/1
1 TABLET ORAL DAILY
Qty: 90 TABLET | Refills: 3 | Status: SHIPPED | OUTPATIENT
Start: 2025-04-29

## 2025-04-29 RX ORDER — LISINOPRIL 20 MG/1
20 TABLET ORAL 2 TIMES DAILY
Qty: 180 TABLET | Refills: 2 | Status: SHIPPED | OUTPATIENT
Start: 2025-04-29

## 2025-04-29 ASSESSMENT — ENCOUNTER SYMPTOMS
VOICE CHANGE: 0
CHOKING: 0
STRIDOR: 0
EYE PAIN: 0
RECTAL PAIN: 0

## 2025-04-29 NOTE — PROGRESS NOTES
chronic medical problems, review labs.        Medicare Annual Wellness Visit    Hebert Vaca is here for Medicare AWV, 6 Month Follow-Up, and Medication Refill    Assessment & Plan   Medicare annual wellness visit, subsequent  Macrocytic anemia  -     folic acid (FOLVITE) 1 MG tablet; Take 1 tablet by mouth daily, Disp-90 tablet, R-3Normal  Hypertension, essential  -     lisinopril (PRINIVIL;ZESTRIL) 20 MG tablet; Take 1 tablet by mouth 2 times daily, Disp-180 tablet, R-2Normal  Aneurysm of ascending aorta without rupture  BPH associated with nocturia  PAF (paroxysmal atrial fibrillation) (HCC)  Polyp of colon, unspecified part of colon, unspecified type  Hypercholesterolemia  -     ALT; Future  -     Lipid Panel; Future  Stage 3a chronic kidney disease (HCC)  Chronic anemia  -     CBC with Auto Differential; Future  -     Basic Metabolic Panel; Future  Impaired fasting glucose  -     Hemoglobin A1C; Future       Return in about 6 months (around 10/29/2025) for follow up of chronic medical problems, review labs.     Subjective       Patient's complete Health Risk Assessment and screening values have been reviewed and are found in Flowsheets. The following problems were reviewed today and where indicated follow up appointments were made and/or referrals ordered.    Positive Risk Factor Screenings with Interventions:    Fall Risk:  Do you feel unsteady or are you worried about falling? : (Patient-Rptd) no  2 or more falls in past year?: (Patient-Rptd) no  Fall with injury in past year?: (!) (Patient-Rptd) yes     Interventions:    Reviewed medications, home hazards, visual acuity, and co-morbidities that can increase risk for falls             Inactivity:  On average, how many days per week do you engage in moderate to strenuous exercise (like a brisk walk)?: (Patient-Rptd) 2 days (!) Abnormal  On average, how many minutes do you engage in exercise at this level?: (Patient-Rptd) 10

## 2025-05-01 DIAGNOSIS — D64.9 CHRONIC ANEMIA: Primary | ICD-10-CM

## 2025-05-05 ENCOUNTER — RESULTS FOLLOW-UP (OUTPATIENT)
Dept: INTERNAL MEDICINE CLINIC | Facility: CLINIC | Age: 75
End: 2025-05-05

## 2025-05-05 DIAGNOSIS — D64.9 CHRONIC ANEMIA: ICD-10-CM

## 2025-05-05 LAB — FOLATE SERPL-MCNC: 26.2 NG/ML (ref 3.1–17.5)

## 2025-05-12 ENCOUNTER — OFFICE VISIT (OUTPATIENT)
Age: 75
End: 2025-05-12
Payer: MEDICARE

## 2025-05-12 VITALS
WEIGHT: 222 LBS | BODY MASS INDEX: 31.78 KG/M2 | HEIGHT: 70 IN | SYSTOLIC BLOOD PRESSURE: 138 MMHG | HEART RATE: 76 BPM | DIASTOLIC BLOOD PRESSURE: 78 MMHG

## 2025-05-12 DIAGNOSIS — I71.21 ANEURYSM OF ASCENDING AORTA WITHOUT RUPTURE: Primary | ICD-10-CM

## 2025-05-12 DIAGNOSIS — I10 HYPERTENSION, ESSENTIAL: ICD-10-CM

## 2025-05-12 DIAGNOSIS — I48.0 PAF (PAROXYSMAL ATRIAL FIBRILLATION) (HCC): ICD-10-CM

## 2025-05-12 PROCEDURE — 1036F TOBACCO NON-USER: CPT | Performed by: INTERNAL MEDICINE

## 2025-05-12 PROCEDURE — 1159F MED LIST DOCD IN RCRD: CPT | Performed by: INTERNAL MEDICINE

## 2025-05-12 PROCEDURE — 99214 OFFICE O/P EST MOD 30 MIN: CPT | Performed by: INTERNAL MEDICINE

## 2025-05-12 PROCEDURE — G8417 CALC BMI ABV UP PARAM F/U: HCPCS | Performed by: INTERNAL MEDICINE

## 2025-05-12 PROCEDURE — 1126F AMNT PAIN NOTED NONE PRSNT: CPT | Performed by: INTERNAL MEDICINE

## 2025-05-12 PROCEDURE — 3078F DIAST BP <80 MM HG: CPT | Performed by: INTERNAL MEDICINE

## 2025-05-12 PROCEDURE — 1160F RVW MEDS BY RX/DR IN RCRD: CPT | Performed by: INTERNAL MEDICINE

## 2025-05-12 PROCEDURE — 3075F SYST BP GE 130 - 139MM HG: CPT | Performed by: INTERNAL MEDICINE

## 2025-05-12 PROCEDURE — 1123F ACP DISCUSS/DSCN MKR DOCD: CPT | Performed by: INTERNAL MEDICINE

## 2025-05-12 PROCEDURE — G8427 DOCREV CUR MEDS BY ELIG CLIN: HCPCS | Performed by: INTERNAL MEDICINE

## 2025-05-12 ASSESSMENT — ENCOUNTER SYMPTOMS
BACK PAIN: 0
EYE PAIN: 0
PHOTOPHOBIA: 0
EYES NEGATIVE: 1
SHORTNESS OF BREATH: 0
ALLERGIC/IMMUNOLOGIC NEGATIVE: 1
ABDOMINAL PAIN: 0
CHEST TIGHTNESS: 0
GASTROINTESTINAL NEGATIVE: 1
RESPIRATORY NEGATIVE: 1

## 2025-05-12 NOTE — PROGRESS NOTES
[facility]  [address] [place] [address], [address]  [place] [address]  PHONE: [phone]      25    NAME:  Hebert Vaca  : 1950  MRN: [id number]         SUBJECTIVE:   Hebert Vaca is a 74 y.o. male seen for follow up of:      Chief Complaint   Patient presents with    Atrial Fibrillation    Hypertension        Cardiac Hx (Reviewed and summarized by me):  1) NM stress   21 - normal perfusion but LVEF 46%  25 - fixed apical defect low risk  2) Echo               21 - LVEF 55-60%, Aortic root is dilated, LAE as well  3) TAA              CT Chest 21 Aortic Root is 4.4 cm (4.25 cm by echo )              6/10/22 - LVEF 55-60% root as listed below              CT Chest 10/24/23 - Asc Aorta is 4.3, STJ 3.8 cm (echo 6/10/22 root 3.5 cm)              Echo 24 - LVEF 55-60% with asc Aorta 4.6 cm.   3/31/25 - Asc Aorta 4.5 cm  4) Monitor   21 - 3d Zio - 1.2% PVCs  22 - PVCs 0.05%, PACs 5.7%  5) Lipids                 21 - HDL 56, LDL 65, Trig 83              22 - HDL 41, LDL 54.6, Trig 127              10/3/23 - HDL 48, LDL 49.8, Trig 76, Ratio 2.4   10/8/24 - HDL 44, LDL 50, Trig 58, Ratio 2.4  6) afib -               Had episode of Afib with Colonoscopy, started on Xarelto (21).  He is unaware.              Attempted ALEX/DCCV 21 - failed started on amiodarone              Successful DCCV 22              Afib ablation 3/29/22 [place]  7) AAA screening 3/23/23 - none seen      HPI:  Doing well.  Denies cardiac symptoms.  Stress test was low overall low risk.  His aorta has grown to 4.5 cm it was 4.4 cm in .    Past Medical History, Past Surgical History, Family history, Social History, and Medications were all reviewed with the patient today and updated as necessary.       Current Outpatient Medications:     folic acid (FOLVITE) 1 MG tablet, Take 1 tablet by mouth daily, Disp: 90 tablet, Rfl: 3    lisinopril

## 2025-06-09 DIAGNOSIS — N13.8 BPH WITH OBSTRUCTION/LOWER URINARY TRACT SYMPTOMS: ICD-10-CM

## 2025-06-09 DIAGNOSIS — N40.1 BPH WITH OBSTRUCTION/LOWER URINARY TRACT SYMPTOMS: ICD-10-CM

## 2025-06-09 LAB — PSA SERPL-MCNC: 7.1 NG/ML (ref 0–4)

## 2025-06-16 ENCOUNTER — OFFICE VISIT (OUTPATIENT)
Dept: UROLOGY | Age: 75
End: 2025-06-16
Payer: MEDICARE

## 2025-06-16 DIAGNOSIS — N40.1 BPH WITH OBSTRUCTION/LOWER URINARY TRACT SYMPTOMS: Primary | ICD-10-CM

## 2025-06-16 DIAGNOSIS — N13.8 BPH WITH OBSTRUCTION/LOWER URINARY TRACT SYMPTOMS: Primary | ICD-10-CM

## 2025-06-16 DIAGNOSIS — R97.20 ELEVATED PSA: ICD-10-CM

## 2025-06-16 LAB
BILIRUBIN, URINE, POC: NEGATIVE
BLOOD URINE, POC: NEGATIVE
GLUCOSE URINE, POC: NEGATIVE MG/DL
KETONES, URINE, POC: NEGATIVE MG/DL
LEUKOCYTE ESTERASE, URINE, POC: NEGATIVE
NITRITE, URINE, POC: NEGATIVE
PH, URINE, POC: 6 (ref 4.6–8)
PROTEIN,URINE, POC: 100 MG/DL
SPECIFIC GRAVITY, URINE, POC: 1.02 (ref 1–1.03)
URINALYSIS CLARITY, POC: CLEAR
URINALYSIS COLOR, POC: NORMAL
UROBILINOGEN, POC: NORMAL MG/DL

## 2025-06-16 PROCEDURE — 1036F TOBACCO NON-USER: CPT | Performed by: UROLOGY

## 2025-06-16 PROCEDURE — 99213 OFFICE O/P EST LOW 20 MIN: CPT | Performed by: UROLOGY

## 2025-06-16 PROCEDURE — 1123F ACP DISCUSS/DSCN MKR DOCD: CPT | Performed by: UROLOGY

## 2025-06-16 PROCEDURE — 1159F MED LIST DOCD IN RCRD: CPT | Performed by: UROLOGY

## 2025-06-16 PROCEDURE — G8417 CALC BMI ABV UP PARAM F/U: HCPCS | Performed by: UROLOGY

## 2025-06-16 PROCEDURE — G8427 DOCREV CUR MEDS BY ELIG CLIN: HCPCS | Performed by: UROLOGY

## 2025-06-16 PROCEDURE — 81003 URINALYSIS AUTO W/O SCOPE: CPT | Performed by: UROLOGY

## 2025-06-16 NOTE — PROGRESS NOTES
[facility]  [address] [place] [id number]  [phone]    Hebert Vaca  : 1950    Chief Complaint   Patient presents with    Other        HPI     eHbert Vaca is a 74 y.o. male \"García\" with elevated PSA with favorable % free PSA, massive BPH (150 gm), on tamsulosin. Takes Xarelto for atrial fib.   The result was a PSA level of 6.0 ng/ml (on 17). Prior PSAs have been stable (4.1 in , 4.2 in , 4.5 in , 4.5 in , 4.4 in ). . Pertinent medical history includes lower urinary tract symptoms (better on Flomax).  PAIGE showed 3+ prostate with 1 cm nodule at right base.     Had prostate biopsy 1-15-18:     PROSTATE VOLUME:  151.85 gm    PSAD 0.04 PSA 6.0  Path: all biopsies benign, 6 of 12 have inflammation.     PSA was 4.3 with 22.6% free in , 4.3 in .   PSA 5.0 in , 4.9 in .  PAIGE showed 3+ prostate with 1 cm nodule on right.   Continue Flomax .he is co nocturia x 3-4 and some weak stream.   PSA 5.4 in .     MRI in : Findings:  The prostate gland measures: 6.9 cm transverse by 6 cm AP by 8 cm craniocaudal.     Peripheral Zone: No concerning defined lesion is seen on T2-weighted images.  Only streaky T2 signal loss are seen most evident in the bilateral prostate  bases, the right mid gland, and the left mid gland/apex.     Central Gland: Severe enlargement of the central gland is seen. This includes  significant enlargement of the median lobe which exerts mass effect on the  bladder base. The only concerning abnormality is a lobulated, defined, uniformly  T2 hypointense lesion in the basilar central gland best appreciated on axial  T2-weighted image 9 measuring 2.3 cm x 1.2 cm in size. This demonstrates  significant signal loss seen on ADC map images and mildly increased signal on  high B value diffusion weighted images. No potentially worrisome transitional  zone lesion is otherwise seen. Focal T2 hypointense

## 2025-08-06 RX ORDER — TAMSULOSIN HYDROCHLORIDE 0.4 MG/1
0.4 CAPSULE ORAL DAILY
Qty: 90 CAPSULE | Refills: 2 | Status: SHIPPED | OUTPATIENT
Start: 2025-08-06

## 2025-08-19 ENCOUNTER — PATIENT MESSAGE (OUTPATIENT)
Dept: INTERNAL MEDICINE CLINIC | Facility: CLINIC | Age: 75
End: 2025-08-19

## (undated) DEVICE — PINNACLE TIF INTRODUCER SHEATH: Brand: PINNACLE

## (undated) DEVICE — CATHETER US GE COMPATIBILITY SOUNDSTAR ECO 10FR

## (undated) DEVICE — DRILL BIT

## (undated) DEVICE — C-ARM: Brand: UNBRANDED

## (undated) DEVICE — GLOVE ORTHO 8   MSG9480

## (undated) DEVICE — SUTURE MCRYL SZ 3-0 L27IN ABSRB UD L19MM PS-2 3/8 CIR PRIM Y427H

## (undated) DEVICE — STERILE PVP: Brand: MEDLINE INDUSTRIES, INC.

## (undated) DEVICE — CATH EP MAP 2-6-2 7FR D CRV -- PENTARAY

## (undated) DEVICE — 18G NG KIT WITH 96IN PROBE COVER (10 PK): Brand: SITE-RITE

## (undated) DEVICE — FOAM BUMP ROUND LARGE: Brand: MEDLINE INDUSTRIES, INC.

## (undated) DEVICE — PADDING CAST W6INXL4YD ST COT COHESIVE HND TEARABLE SPEC

## (undated) DEVICE — PADDING CAST W4INXL4YD NONSTERILE COT COHESIVE HND TEARABLE

## (undated) DEVICE — CATH DECANAV EP F CURVE 7FR --

## (undated) DEVICE — SPLINT ORTH W5XL30IN PLSTR OF PARIS LO EXOTHERM SMOOTH

## (undated) DEVICE — SUTURE ETHLN SZ 3-0 L18IN NONABSORBABLE BLK L24MM PS-1 3/8 1663G

## (undated) DEVICE — PINNACLE INTRODUCER SHEATH: Brand: PINNACLE

## (undated) DEVICE — KENDALL RADIOLUCENT FOAM MONITORING ELECTRODE RECTANGULAR SHAPE: Brand: KENDALL

## (undated) DEVICE — CONNECTOR TBNG OD5-7MM O2 END DISP

## (undated) DEVICE — Device: Brand: NRG TRANSSEPTAL NEEDLE

## (undated) DEVICE — SOLUTION IRRIG 1000ML 0.9% SOD CHL USP POUR PLAS BTL

## (undated) DEVICE — CATHETER ABLAT 8FR L115CM 1-6-2MM SPC TIP 3.5MM FJ CRV

## (undated) DEVICE — GLOVE ORANGE PI 7 1/2   MSG9075

## (undated) DEVICE — SYR 3ML LL TIP 1/10ML GRAD --

## (undated) DEVICE — BANDAGE,ELASTIC,ESMARK,STERILE,4"X9',LF: Brand: MEDLINE

## (undated) DEVICE — FOOT DR TOLLISON & WOMACK: Brand: MEDLINE INDUSTRIES, INC.

## (undated) DEVICE — CANNULA NSL ORAL AD FOR CAPNOFLEX CO2 O2 AIRLFE

## (undated) DEVICE — PAD,ABDOMINAL,5"X9",ST,LF,25/BX: Brand: MEDLINE INDUSTRIES, INC.

## (undated) DEVICE — PADDING CAST W6INXL4YD COT COHESIVE HND TEARABLE SPEC 100

## (undated) DEVICE — ZIMMER® STERILE DISPOSABLE TOURNIQUET CUFF WITH PLC, DUAL PORT, SINGLE BLADDER, 30 IN. (76 CM)

## (undated) DEVICE — INTRODUCER SHTH CARDIAGUIDE FCL20100

## (undated) DEVICE — Device

## (undated) DEVICE — GOWN,SIRUS,NONRNF,SETINSLV,XL,20/CS: Brand: MEDLINE

## (undated) DEVICE — TUBE SET IRR PUMP THERMALCOOL -- SMARTABLATE

## (undated) DEVICE — CATHETER EP 7FR L115CM 2-8-2MM SPC TIP 2MM 10 ELECTRD FJ

## (undated) DEVICE — GLOVE SURG SZ 85 L12IN FNGR ORTHO 126MIL CRM LTX FREE

## (undated) DEVICE — NDL PRT INJ NSAF BLNT 18GX1.5 --

## (undated) DEVICE — SYR 5ML 1/5 GRAD LL NSAF LF --

## (undated) DEVICE — SHEET,DRAPE,53X77,STERILE: Brand: MEDLINE

## (undated) DEVICE — SYRINGE, LUER SLIP, STERILE, 60ML: Brand: MEDLINE

## (undated) DEVICE — CLOTH PRE OP W9XL10.5IN 2% CHG FRAGRANCE RNS FREE READYPREP

## (undated) DEVICE — PRESSURE MONITORING SET: Brand: TRUWAVE, VAMP PLUS

## (undated) DEVICE — GLOVE SURG SZ 8 L12IN FNGR THK79MIL GRN LTX FREE

## (undated) DEVICE — PATCH CARTO 3 EXT REF --

## (undated) DEVICE — SYSTEM CLOSURE 6-12 FR VEN VASC VASCADE MVP